# Patient Record
Sex: FEMALE | Race: WHITE | NOT HISPANIC OR LATINO | Employment: OTHER | URBAN - METROPOLITAN AREA
[De-identification: names, ages, dates, MRNs, and addresses within clinical notes are randomized per-mention and may not be internally consistent; named-entity substitution may affect disease eponyms.]

---

## 2017-02-09 ENCOUNTER — OFFICE VISIT (OUTPATIENT)
Dept: PODIATRY | Facility: CLINIC | Age: 82
End: 2017-02-09
Payer: MEDICARE

## 2017-02-09 VITALS
WEIGHT: 153.25 LBS | DIASTOLIC BLOOD PRESSURE: 76 MMHG | RESPIRATION RATE: 18 BRPM | SYSTOLIC BLOOD PRESSURE: 138 MMHG | HEIGHT: 63 IN | HEART RATE: 61 BPM | BODY MASS INDEX: 27.15 KG/M2

## 2017-02-09 DIAGNOSIS — E11.49 TYPE II DIABETES MELLITUS WITH NEUROLOGICAL MANIFESTATIONS: Primary | ICD-10-CM

## 2017-02-09 DIAGNOSIS — L84 CORN OR CALLUS: ICD-10-CM

## 2017-02-09 DIAGNOSIS — B35.1 DERMATOPHYTOSIS OF NAIL: ICD-10-CM

## 2017-02-09 PROCEDURE — 11721 DEBRIDE NAIL 6 OR MORE: CPT | Mod: 59,Q9,S$PBB, | Performed by: PODIATRIST

## 2017-02-09 PROCEDURE — 99213 OFFICE O/P EST LOW 20 MIN: CPT | Mod: PBBFAC,25 | Performed by: PODIATRIST

## 2017-02-09 PROCEDURE — 11056 PARNG/CUTG B9 HYPRKR LES 2-4: CPT | Mod: Q9,S$PBB,, | Performed by: PODIATRIST

## 2017-02-09 PROCEDURE — 11721 DEBRIDE NAIL 6 OR MORE: CPT | Mod: Q9,PBBFAC | Performed by: PODIATRIST

## 2017-02-09 PROCEDURE — 99213 OFFICE O/P EST LOW 20 MIN: CPT | Mod: 25,S$PBB,, | Performed by: PODIATRIST

## 2017-02-09 PROCEDURE — 11056 PARNG/CUTG B9 HYPRKR LES 2-4: CPT | Mod: Q9,PBBFAC | Performed by: PODIATRIST

## 2017-02-09 PROCEDURE — 99999 PR PBB SHADOW E&M-EST. PATIENT-LVL III: CPT | Mod: PBBFAC,,, | Performed by: PODIATRIST

## 2017-02-09 RX ORDER — DICLOFENAC SODIUM 50 MG/1
50 TABLET, DELAYED RELEASE ORAL 2 TIMES DAILY
COMMUNITY
End: 2022-07-19

## 2017-02-09 NOTE — PROGRESS NOTES
Subjective:      Patient ID: Rosy Balderrama is a 85 y.o. female.    Chief Complaint: Follow-up and Nail Care    Rosy is a 85 y.o. female who presents to the clinic for evaluation and treatment of high risk feet. Rosy has a past medical history of *Atrial fibrillation; Allergy; Blood transfusion; Cataract; Diabetes mellitus type II; Hyperlipidemia; and Osteoporosis. The patient's chief complaint is long, thick toenails and calluses. This patient has documented high risk feet requiring routine maintenance secondary to diabetes mellitis and those secondary complications of diabetes, as mentioned..  She relates that she has been experiencing 6 out of 10 tingling pins and needles pain running down her lower extremities.  She states she has been seen a back specialist and did receive the injection recently but did not experience much improvement.  She is scheduled to follow back up.    PCP: Jeffry Barrios MD    Date Last Seen by PCP: December 2016    Current shoe gear:  Affected Foot: Extra depth shoes with custome accommodative insoles     Unaffected Foot: Extra depth shoes with custome accommodative insoles    No results found for: HGBA1C    Review of Systems   Constitution: Negative for chills and fever.   Cardiovascular: Negative for chest pain.   Respiratory: Negative for shortness of breath.    Gastrointestinal: Negative for nausea and vomiting.           Objective:      Physical Exam   Constitutional: She is oriented to person, place, and time. She appears well-developed and well-nourished. No distress.   Cardiovascular:   Pulses:       Dorsalis pedis pulses are 2+ on the right side, and 2+ on the left side.        Posterior tibial pulses are 0 on the right side, and 0 on the left side.   Capillary fill time 3-5 seconds to digits bilateral feet.   Musculoskeletal:   Lower extremities:    Deformities: Hypertrophic hipj condyles deformities with normal to high arch height and rectus feet bilaterally.     5/5  muscle strength and tone in all four quadrants bilaterally.     Pain-free range of motion in all four quadrants with stiffness and limitation bilaterally.     Pain on palpation: Mild bilateral plantarmedial hallux and 1st mpj worse on the right.   Neurological: She is alert and oriented to person, place, and time. She displays no Babinski's sign on the right side. She displays no Babinski's sign on the left side.   Plantar protective sensation by touch via 5.07 [place] Liliana monofilament present bilaterally.   Skin:   Lower extremities:    Normal turgor, texture, temperature bilaterally. Digital hair present bilaterally. Warm equally bilaterally.    Bilateral ankle edema.  No varicosities, pigmentary changes.    No wounds, drainage, malodor, erythema, interdigital maceration were noted.     Hyperkeratotic buildup plantarmedial right hipj and 1st mpj bilateral feet.    Nails are thickened and incurvated bilaterally.    Psychiatric: She has a normal mood and affect.   Nursing note and vitals reviewed.            Assessment:       Encounter Diagnoses   Name Primary?    Type II diabetes mellitus with neurological manifestations Yes    Dermatophytosis of nail     Corn or callus          Plan:       Rosy was seen today for follow-up and nail care.    Diagnoses and all orders for this visit:    Type II diabetes mellitus with neurological manifestations    Dermatophytosis of nail    Corn or callus      I counseled the patient on her conditions, their implications and medical management.  We discussed source of her radiating pain as referred pain from her back first his possible component of peripheral neuropathy.  We discussed trying oral L methyl folate supplements for her symptoms versus anodyne therapy.  She wishes to hold off and follow-up with her pain management doctor first and we may consider other alternatives later.    After cleansing with alcohol prep pad, the above mentioned hyperkeratotic lesions were  sharply excisionally debrided x3 utilizing #15 blade to a smooth base without incident. Patient tolerated procedure well and reported comfort to the debridement sites. Off-loading cushioned pads were applied and dispensed to the patient with information on how to order additional pads for maintenance care of keratotic build up. Patient will continue to apply prescription urea lotion or over the counter alternatives to areas of recurrent skin build up.    - Shoe inspection. Diabetic Foot Education. Patient reminded of the importance of good nutrition and blood sugar control to help prevent podiatric complications of diabetes. Patient instructed on proper foot hygeine. We discussed wearing proper shoe gear, daily foot inspections, never walking without protective shoe gear, never putting sharp instruments to feet, routine podiatric nail visits every 2-3 months.      - With patient's permission, nails were aggressively reduced and debrided x 10 to their soft tissue attachment mechanically and with electric , removing all offending nail and debris. Patient relates relief following the procedure. She will continue to monitor the areas daily, inspect her feet, wear protective shoe gear when ambulatory, moisturizer to maintain skin integrity and follow in this office in approximately 2-3 months, sooner p.r.n.

## 2017-02-09 NOTE — MR AVS SNAPSHOT
[facility]  [address] [facility] [address]  Phone: [phone]  Fax: [phone]                  Rosy Balderrama   2017 10:20 AM   Office Visit    Description:  Female : 1931   Provider:  Ingris Calle DPM   Department:  [facility]           Reason for Visit     Follow-up     Nail Care                To Do List           Future Appointments        Provider Department Dept Phone    2017 10:00 AM Ingris Calle DPM [facility] [phone]      Goals (5 Years of Data)     None      [facility]     [facility] Nurse [facility] Line -  Assistance  Registered nurses in the [facility] Center provide clinical advisement, health education, appointment booking, and other advisory services.  Call for this free service at [phone].             Medications           Message regarding Medications     Verify the changes and/or additions to your medication regime listed below are the same as discussed with your clinician today.  If any of these changes or additions are incorrect, please notify your healthcare provider.             Verify that the below list of medications is an accurate representation of the medications you are currently taking.  If none reported, the list may be blank. If incorrect, please contact your healthcare provider. Carry this list with you in case of emergency.           Current Medications     acetaminophen (TYLENOL) 500 MG tablet Take by mouth. 1 Tablet Oral    AMINO ACIDS/MINERALS (A/G PRO ORAL) Take by mouth.    ammonium lactate 12 % Crea Apply topically.    ANTIOX#10/OM3/DHA/EPA/LUT/ZEAX (I-CAPS ORAL) Take by mouth.    ASCORBATE CALCIUM (VITAMIN C ORAL) Take by mouth.    ascorbic acid (VITAMIN C) 500 MG tablet Take 500 mg by mouth once daily.    aspirin 81 MG Chew Take 81 mg by mouth once daily.    bumetanide (BUMEX) 1 MG tablet     CALCIUM CARBONATE/VITAMIN D3 (CALTRATE 600 + D ORAL) Take by mouth.     "carboxymethylcellulose (REFRESH PLUS) 0.5 % Dpet 1 drop 3 (three) times daily as needed.    diclofenac (VOLTAREN) 50 MG EC tablet Take 50 mg by mouth 2 (two) times daily.    fluocinolone (DERMA-SMOOTHE) 0.01 % external oil Apply topically 3 (three) times daily.    fluticasone (FLONASE) 50 mcg/actuation nasal spray     folic acid-vit B6-vit B12 2.5-25-2 mg (FOLBIC) 2.5-25-2 mg Tab Take 1 tablet by mouth once daily.    FREESTYLE LANCETS 28 gauge lancets     FREESTYLE LITE STRIPS Strp     METHYL SALICYLATE/MENTHOL (BANALG-HS LINIMENT TOP) Apply topically.    metoprolol succinate (TOPROL-XL) 25 MG 24 hr tablet Take 25 mg by mouth once daily.    metoprolol tartrate (LOPRESSOR) 25 MG tablet     MULTIVITAMIN ORAL Take by mouth. Vitamin I    mupirocin (BACTROBAN) 2 % ointment 2 (two) times daily. Apply to affected area    oxybutynin (DITROPAN) 5 MG Tab     peg 400-propylene glycol, PF, (SYSTANE, PF,) 0.4-0.3 % Dpet Apply to eye.    polyethylene glycol (GLYCOLAX) 17 gram PwPk Take by mouth.    pravastatin (PRAVACHOL) 40 MG tablet     PROPYLENE GLYCOL/ (SYSTANE OPHT) Apply to eye.    sitagliptan (JANUVIA) 100 MG Tab Take 100 mg by mouth once daily.    sodium chloride 0.9 % SprA by Nasal route.    tramadol (ULTRAM) 50 mg tablet     triamcinolone acetonide 0.1% (KENALOG) 0.1 % cream Apply topically 2 (two) times daily.    urea (X-VIATE) 40 % Crea Apply topically once daily. Apply topically to bottom of foot for calluses    nitrofurantoin, macrocrystal-monohydrate, (MACROBID) 100 MG capsule            Clinical Reference Information           Your Vitals Were     BP Pulse Resp Height Weight BMI    138/76 61 18 5' 3" (1.6 m) 69.5 kg (153 lb 3.5 oz) 27.14 kg/m2      Blood Pressure          Most Recent Value    BP  138/76      Allergies as of 2/9/2017     Alendronate    Alendronate Sodium    Atorvastatin    Ciprofloxacin    Diazepam    Ibuprofen    Oxycodone-acetaminophen    Rofecoxib    Simvastatin      Immunizations " Administered on Date of Encounter - 2/9/2017     None      [facility] TV[facility]ariel[facility] Sign-Up     Activating your [facility] account is as easy as 1-2-3!     1) Visit [email or web address], select Sign Up Now, enter this activation code and your date of birth, then select Next.  [email or web address]  Expires: 3/26/2017 11:23 AM      2) Create a username and password to use when you visit [facility] in the future and select a security question in case you lose your password and select Next.    3) Enter your e-mail address and click Sign Up!    Additional Information  If you have questions, please e-mail [email or web address] or call [phone] to talk to our [facility] staff. Remember, [facility] is NOT to be used for urgent needs. For medical emergencies, dial 911.         Language Assistance Services     ATTENTION: Language assistance services are available, free of charge. Please call [phone].      ATENCIÓN: Si habla español, tiene a chavarria disposición servicios gratuitos de asistencia lingüística. Llame al [phone].     CHÚ Ý: N?u b?n nói Ti?ng Vi?t, có các d?ch v? h? tr? ngôn ng? mi?n phí dành cho b?n. G?i s? [phone].         [facility] complies with applicable Federal civil rights laws and does not discriminate on the basis of race, color, national origin, age, disability, or sex.

## 2017-06-14 ENCOUNTER — OFFICE VISIT (OUTPATIENT)
Dept: PODIATRY | Facility: CLINIC | Age: 82
End: 2017-06-14
Payer: MEDICARE

## 2017-06-14 VITALS
BODY MASS INDEX: 26.11 KG/M2 | HEIGHT: 63 IN | HEART RATE: 55 BPM | WEIGHT: 147.38 LBS | SYSTOLIC BLOOD PRESSURE: 107 MMHG | DIASTOLIC BLOOD PRESSURE: 68 MMHG

## 2017-06-14 DIAGNOSIS — E11.49 TYPE II DIABETES MELLITUS WITH NEUROLOGICAL MANIFESTATIONS: Primary | ICD-10-CM

## 2017-06-14 DIAGNOSIS — B35.1 DERMATOPHYTOSIS OF NAIL: ICD-10-CM

## 2017-06-14 DIAGNOSIS — L84 CORN OR CALLUS: ICD-10-CM

## 2017-06-14 DIAGNOSIS — S91.301A WOUND, OPEN, FOOT, RIGHT, INITIAL ENCOUNTER: ICD-10-CM

## 2017-06-14 PROCEDURE — 11056 PARNG/CUTG B9 HYPRKR LES 2-4: CPT | Mod: Q8,PBBFAC | Performed by: PODIATRIST

## 2017-06-14 PROCEDURE — 97597 DBRDMT OPN WND 1ST 20 CM/<: CPT | Mod: 59,S$PBB,, | Performed by: PODIATRIST

## 2017-06-14 PROCEDURE — 97597 DBRDMT OPN WND 1ST 20 CM/<: CPT | Mod: Q8,PBBFAC | Performed by: PODIATRIST

## 2017-06-14 PROCEDURE — 1126F AMNT PAIN NOTED NONE PRSNT: CPT | Mod: S$PBB,,, | Performed by: PODIATRIST

## 2017-06-14 PROCEDURE — 11721 DEBRIDE NAIL 6 OR MORE: CPT | Mod: Q8,PBBFAC | Performed by: PODIATRIST

## 2017-06-14 PROCEDURE — 11056 PARNG/CUTG B9 HYPRKR LES 2-4: CPT | Mod: Q8,S$PBB,, | Performed by: PODIATRIST

## 2017-06-14 PROCEDURE — 99213 OFFICE O/P EST LOW 20 MIN: CPT | Mod: PBBFAC | Performed by: PODIATRIST

## 2017-06-14 PROCEDURE — 1159F MED LIST DOCD IN RCRD: CPT | Mod: S$PBB,,, | Performed by: PODIATRIST

## 2017-06-14 PROCEDURE — 99999 PR PBB SHADOW E&M-EST. PATIENT-LVL III: CPT | Mod: PBBFAC,,, | Performed by: PODIATRIST

## 2017-06-14 PROCEDURE — 99213 OFFICE O/P EST LOW 20 MIN: CPT | Mod: 25,S$PBB,, | Performed by: PODIATRIST

## 2017-06-14 PROCEDURE — 11721 DEBRIDE NAIL 6 OR MORE: CPT | Mod: 59,Q8,S$PBB, | Performed by: PODIATRIST

## 2017-06-14 RX ORDER — SILVER SULFADIAZINE 10 G/1000G
CREAM TOPICAL 2 TIMES DAILY
Qty: 50 G | Refills: 1 | Status: SHIPPED | OUTPATIENT
Start: 2017-06-14 | End: 2017-07-14

## 2017-06-14 RX ORDER — SILVER SULFADIAZINE 10 G/1000G
CREAM TOPICAL 2 TIMES DAILY
Qty: 50 G | Refills: 1 | Status: SHIPPED | OUTPATIENT
Start: 2017-06-14 | End: 2017-06-14 | Stop reason: CLARIF

## 2017-06-14 NOTE — PROGRESS NOTES
Subjective:      Patient ID: Rosy Balderrama is a 85 y.o. female.    Chief Complaint: Nail Care and Diabetes Mellitus    Rosy is a 85 y.o. female who presents to the clinic for evaluation and treatment of high risk feet. Rosy has a past medical history of *Atrial fibrillation; Allergy; Blood transfusion; Cataract; Diabetes mellitus type II; Hyperlipidemia; and Osteoporosis. The patient's chief complaint is long, thick toenails. This patient has documented high risk feet requiring routine maintenance secondary to diabetes mellitis and those secondary complications of diabetes, as mentioned..  She is here with her daughter who also expresses some concern about a wound under her right fourth toe which she noticed about 2 weeks ago.  She states that initially the wound started off as a small fissure possibly from a blister and started to heal up but last week appeared to break back oh then in a slightly lateral location from where it originally started.  Her daughter suspects that her mother over did some activities in a certain type of slipper which she has now discarded.    PCP: Jeffry Barrios MD    Date Last Seen by PCP: Jan 2017    Current shoe gear:  Affected Foot: Extra depth shoes with custome accommodative insoles     Unaffected Foot: Extra depth shoes with custome accommodative insoles    No results found for: HGBA1C    Review of Systems   Constitution: Negative for chills and fever.   Cardiovascular: Negative for chest pain.   Respiratory: Negative for shortness of breath.    Gastrointestinal: Negative for nausea and vomiting.           Objective:      Physical Exam   Constitutional: She is oriented to person, place, and time. She appears well-developed and well-nourished. No distress.   Cardiovascular:   Pulses:       Dorsalis pedis pulses are 2+ on the right side, and 2+ on the left side.        Posterior tibial pulses are 0 on the right side, and 0 on the left side.   Capillary fill time 3-5 seconds to  digits bilateral feet.   Musculoskeletal:   Lower extremities:    Deformities: Hypertrophic hipj condyles deformities with normal to high arch height and rectus feet bilaterally.     5/5 muscle strength and tone in all four quadrants bilaterally.     Pain-free range of motion in all four quadrants with stiffness and limitation bilaterally.     Pain on palpation: Mild bilateral plantarmedial hallux and 1st mpj worse on the right.   Neurological: She is alert and oriented to person, place, and time. She displays no Babinski's sign on the right side. She displays no Babinski's sign on the left side.   Plantar protective sensation by touch via 5.07 [place] Liliana monofilament present bilaterally.   Skin:   Lower extremities:    Normal turgor, texture, temperature bilaterally. Digital hair present bilaterally. Warm equally bilaterally.    No varicosities, pigmentary changes.    Wound: Right sub-fourth met head 5 x 2 x 2 mm dermal partial thickness ulcer with hyperkeratotic rim.  Drainage: serosanguinous spotting  Malodor: None  Erythema: Mild local periwound  Edema: Mild local periwound. Bilateral ankle edema.  Interdigital maceration: None  Skin lesions: None    No wounds, drainage, malodor, erythema, interdigital maceration were noted.     Hyperkeratotic buildup plantarmedial right hipj and 1st mpj bilateral feet.    Nails are thickened and incurvated bilaterally.    Psychiatric: She has a normal mood and affect.   Nursing note and vitals reviewed.            Assessment:       Encounter Diagnoses   Name Primary?    Type II diabetes mellitus with neurological manifestations Yes    Wound, open, foot, right, initial encounter     Dermatophytosis of nail     Corn or callus          Plan:       Rosy was seen today for nail care and diabetes mellitus.    Diagnoses and all orders for this visit:    Type II diabetes mellitus with neurological manifestations    Wound, open, foot, right, initial  encounter    Dermatophytosis of nail    Corn or callus    Other orders  -     silver sulfADIAZINE 1% (SILVADENE) 1 % cream; Apply topically 2 (two) times daily.      I counseled the patient on her conditions, their implications and medical management.    Dermal fissure wound with keratotic rim was excisionally debrided of viable and nonviable tissue with #15 blade down to partial thickness dermal tissue base with serosanguineous spotting.  Wound was dressed with Silvadene and Band-Aid. Patient was instructed on continuing application of antibiotic ointment with a light dressing for additional week.  Patient was guided on appropriate offloading with padding and extra depth wider shoes to prevent recurrence.  Prescription was also written for Silvadene.  If she notices worsening of the fissure was she will schedule to return to clinic immediately.  Patient will call if any problems arise.    After cleansing with alcohol prep pad, the above mentioned hyperkeratotic lesions were sharply trimmed x3 utilizing #15 blade to a smooth base without incident. Patient tolerated procedure well and reported comfort to the debridement sites. Patient will continue to use padding and apply prescription urea lotion or over the counter alternatives to areas of recurrent skin build up.    - Shoe inspection. Diabetic Foot Education. Patient reminded of the importance of good nutrition and blood sugar control to help prevent podiatric complications of diabetes. Patient instructed on proper foot hygeine. We discussed wearing proper shoe gear, daily foot inspections, never walking without protective shoe gear, never putting sharp instruments to feet, routine podiatric nail visits every 2-3 months.      - With patient's permission, nails were aggressively reduced and debrided x 10 to their soft tissue attachment mechanically and with electric , removing all offending nail and debris. Patient relates relief following the procedure. She  will continue to monitor the areas daily, inspect her feet, wear protective shoe gear when ambulatory, moisturizer to maintain skin integrity and follow in this office in approximately 2-3 months, sooner p.r.n.

## 2017-06-14 NOTE — PATIENT INSTRUCTIONS
Keep wound and dressings clean and dry. Dressing changes as directed. Avoid pressure to the wound area and unnecessary ambulation until complete healing. The patient was instructed to call for any signs of worsening wound or infection.    Remember the importance of good nutrition and blood sugar control to help prevent foot complications of diabetes and see your internist at least ever six months. Always wear proper shoe gear. Inspect your feet daily. Always call the clinic immediately if you notice something on your feet that is not normal such as a blister, wound, or foreign object stuck in your foot.

## 2017-08-16 ENCOUNTER — OFFICE VISIT (OUTPATIENT)
Dept: PODIATRY | Facility: CLINIC | Age: 82
End: 2017-08-16
Payer: MEDICARE

## 2017-08-16 VITALS
DIASTOLIC BLOOD PRESSURE: 72 MMHG | BODY MASS INDEX: 26.05 KG/M2 | HEART RATE: 56 BPM | SYSTOLIC BLOOD PRESSURE: 145 MMHG | RESPIRATION RATE: 20 BRPM | HEIGHT: 63 IN | WEIGHT: 147 LBS

## 2017-08-16 DIAGNOSIS — B35.1 DERMATOPHYTOSIS OF NAIL: ICD-10-CM

## 2017-08-16 DIAGNOSIS — E11.49 TYPE II DIABETES MELLITUS WITH NEUROLOGICAL MANIFESTATIONS: Primary | ICD-10-CM

## 2017-08-16 PROCEDURE — 11721 DEBRIDE NAIL 6 OR MORE: CPT | Mod: Q8,PBBFAC | Performed by: PODIATRIST

## 2017-08-16 PROCEDURE — 99999 PR PBB SHADOW E&M-EST. PATIENT-LVL III: CPT | Mod: PBBFAC,,, | Performed by: PODIATRIST

## 2017-08-16 PROCEDURE — 99499 UNLISTED E&M SERVICE: CPT | Mod: S$PBB,,, | Performed by: PODIATRIST

## 2017-08-16 PROCEDURE — 99213 OFFICE O/P EST LOW 20 MIN: CPT | Mod: PBBFAC | Performed by: PODIATRIST

## 2017-08-16 PROCEDURE — 11721 DEBRIDE NAIL 6 OR MORE: CPT | Mod: Q8,S$PBB,, | Performed by: PODIATRIST

## 2017-08-16 NOTE — PATIENT INSTRUCTIONS
Remember the importance of good nutrition and blood sugar control to help prevent foot complications of diabetes and see your internist at least ever six months. Always wear proper shoe gear. Inspect your feet daily. Always call the clinic immediately if you notice something on your feet that is not normal such as a blister, wound, or foreign object stuck in your foot.     [facility]  [address] [facility] [place] [address]   Phone: [phone]  Fax: [phone] [address] [phone]

## 2017-08-16 NOTE — PROGRESS NOTES
Subjective:      Patient ID: Rosy Balderrama is a 85 y.o. female.    Chief Complaint: Nail Care and Diabetes Mellitus (PCP Mamta )    Rosy is a 85 y.o. female who presents to the clinic for evaluation and treatment of high risk feet. Rosy has a past medical history of *Atrial fibrillation; Allergy; Blood transfusion; Cataract; Diabetes mellitus type II; Hyperlipidemia; and Osteoporosis. The patient's chief complaint is long, thick toenails. This patient has documented high risk feet requiring routine maintenance secondary to diabetes mellitis and those secondary complications of diabetes, as mentioned.. Wound noted at prior visit now healed. Patient requests new prescription for diabetic shoes.    PCP: Jeffry Barrios MD    Date Last Seen by PCP: August 2017    Current shoe gear:  Affected Foot: Rx diabetic extra depth shoes and custom accommodative insoles     Unaffected Foot: Rx diabetic extra depth shoes and custom accommodative insoles    No results found for: HGBA1C    Review of Systems   Constitution: Negative for chills and fever.   Cardiovascular: Negative for chest pain.   Respiratory: Negative for shortness of breath.    Gastrointestinal: Negative for nausea and vomiting.           Objective:      Physical Exam   Constitutional: She is oriented to person, place, and time. She appears well-developed and well-nourished. No distress.   Cardiovascular:   Pulses:       Dorsalis pedis pulses are 2+ on the right side, and 2+ on the left side.        Posterior tibial pulses are 0 on the right side, and 0 on the left side.   Capillary fill time 3-5 seconds to digits bilateral feet.   Musculoskeletal:   Lower extremities:    Deformities: Hypertrophic hipj condyles deformities with normal to high arch height and rectus feet bilaterally.     5/5 muscle strength and tone in all four quadrants bilaterally.     Pain-free range of motion in all four quadrants with stiffness and limitation bilaterally.     Pain on  palpation: Mild bilateral plantarmedial hallux and 1st mpj worse on the right.   Neurological: She is alert and oriented to person, place, and time. She displays no Babinski's sign on the right side. She displays no Babinski's sign on the left side.   Plantar protective sensation by touch via 5.07 [place] Liliana monofilament present bilaterally.   Skin:   Lower extremities:    Normal turgor, texture, temperature bilaterally. Digital hair present bilaterally. Warm equally bilaterally.    No varicosities, pigmentary changes.    Wound: Right sub-fourth met head 5 x 2 x 2 mm dermal partial thickness ulcer with hyperkeratotic rim.  Drainage: serosanguinous spotting  Malodor: None  Erythema: Mild local periwound  Edema: Mild local periwound. Bilateral ankle edema.  Interdigital maceration: None  Skin lesions: None    No wounds, drainage, malodor, erythema, interdigital maceration were noted.     Hyperkeratotic buildup plantarmedial right hipj and 1st mpj bilateral feet.    Nails are thickened and incurvated bilaterally.    Psychiatric: She has a normal mood and affect.   Nursing note and vitals reviewed.            Assessment:       Encounter Diagnoses   Name Primary?    Type II diabetes mellitus with neurological manifestations Yes    Dermatophytosis of nail          Plan:       Rosy was seen today for nail care and diabetes mellitus.    Diagnoses and all orders for this visit:    Type II diabetes mellitus with neurological manifestations  -     DIABETIC SHOES FOR HOME USE    Dermatophytosis of nail  -     DIABETIC SHOES FOR HOME USE      I counseled the patient on her conditions, their implications and medical management.    Patient was given a prescription for diabetic extra depth shoes with custom inserts to be casted and fitted for inserts and shoes. Patient will followup after shoe and insert dispensal to check for any discomfort or irritations.  Explained to the patient that symptoms are most consistent with  diabetic peripheral neuropathy and that blood sugar control is crucial to control any progression or worsening of symptoms.     - Shoe inspection. Diabetic Foot Education. Patient reminded of the importance of good nutrition and blood sugar control to help prevent podiatric complications of diabetes. Patient instructed on proper foot hygeine. We discussed wearing proper shoe gear, daily foot inspections, never walking without protective shoe gear, never putting sharp instruments to feet, routine podiatric nail visits every 2-3 months.      - With patient's permission, nails were aggressively reduced and debrided x 10 to their soft tissue attachment mechanically and with electric , removing all offending nail and debris. Patient relates relief following the procedure. She will continue to monitor the areas daily, inspect her feet, wear protective shoe gear when ambulatory, moisturizer to maintain skin integrity and follow in this office in approximately 2-3 months, sooner p.r.n.

## 2017-08-21 ENCOUNTER — TELEPHONE (OUTPATIENT)
Dept: PODIATRY | Facility: CLINIC | Age: 82
End: 2017-08-21

## 2017-08-21 NOTE — TELEPHONE ENCOUNTER
----- Message from Harjinder Cm sent at 8/21/2017  1:56 PM CDT -----  Pt is requesting a call from nurse to f/u on her diabetic shoes.        Please call pt back at [phone] please leave a detail message

## 2017-08-21 NOTE — TELEPHONE ENCOUNTER
Ms. Rosy Balderrama was given a return call, but there was no answer or an option to leave a message.

## 2017-08-21 NOTE — TELEPHONE ENCOUNTER
----- Message from Harjinder Cm sent at 8/21/2017  1:56 PM CDT -----  Pt is requesting a call from nurse to f/u on her diabetic shoes.        Please call pt back at [phone] please leave a detail message

## 2017-08-24 ENCOUNTER — TELEPHONE (OUTPATIENT)
Dept: PODIATRY | Facility: CLINIC | Age: 82
End: 2017-08-24

## 2017-08-24 NOTE — TELEPHONE ENCOUNTER
----- Message from Halina Steen sent at 8/24/2017  9:00 AM CDT -----  Contact: pt  Please call pt @ [phone] or leave message, regarding diabetic shoes.

## 2017-08-25 ENCOUNTER — TELEPHONE (OUTPATIENT)
Dept: PODIATRY | Facility: CLINIC | Age: 82
End: 2017-08-25

## 2017-08-25 NOTE — TELEPHONE ENCOUNTER
Spoke with patient. States she misplaced her Rx for Diabetic Shoes and would like new one faxed to [facility]. Advised pt a new one will be faxed over today. Verbalized understanding. Call ended well.

## 2017-08-25 NOTE — TELEPHONE ENCOUNTER
----- Message from Tayler Jacob sent at 8/25/2017  2:31 PM CDT -----  Contact: Pt  Pt called to speak to the nurse to request a new set of instructions as to where she needs to go to be fitted for her diabetic shoes and would like a call back today asap. Pt would like the information sent to [employer] [phone] phone located at [address], [place].     Pt can be reached at [phone].    Thanks

## 2017-11-22 ENCOUNTER — OFFICE VISIT (OUTPATIENT)
Dept: PODIATRY | Facility: CLINIC | Age: 82
End: 2017-11-22
Payer: MEDICARE

## 2017-11-22 VITALS
HEIGHT: 63 IN | WEIGHT: 150.94 LBS | HEART RATE: 60 BPM | SYSTOLIC BLOOD PRESSURE: 130 MMHG | DIASTOLIC BLOOD PRESSURE: 70 MMHG | BODY MASS INDEX: 26.75 KG/M2

## 2017-11-22 DIAGNOSIS — E11.49 TYPE II DIABETES MELLITUS WITH NEUROLOGICAL MANIFESTATIONS: Primary | ICD-10-CM

## 2017-11-22 DIAGNOSIS — B35.1 DERMATOPHYTOSIS OF NAIL: ICD-10-CM

## 2017-11-22 DIAGNOSIS — L84 CORN OR CALLUS: ICD-10-CM

## 2017-11-22 PROCEDURE — 99213 OFFICE O/P EST LOW 20 MIN: CPT | Mod: PBBFAC | Performed by: PODIATRIST

## 2017-11-22 PROCEDURE — 11721 DEBRIDE NAIL 6 OR MORE: CPT | Mod: 59,Q8,S$PBB, | Performed by: PODIATRIST

## 2017-11-22 PROCEDURE — 99499 UNLISTED E&M SERVICE: CPT | Mod: S$PBB,,, | Performed by: PODIATRIST

## 2017-11-22 PROCEDURE — 99999 PR PBB SHADOW E&M-EST. PATIENT-LVL III: CPT | Mod: PBBFAC,,, | Performed by: PODIATRIST

## 2017-11-22 PROCEDURE — 11721 DEBRIDE NAIL 6 OR MORE: CPT | Mod: Q8,PBBFAC | Performed by: PODIATRIST

## 2017-11-22 PROCEDURE — 11056 PARNG/CUTG B9 HYPRKR LES 2-4: CPT | Mod: Q8,PBBFAC | Performed by: PODIATRIST

## 2017-11-22 PROCEDURE — 11056 PARNG/CUTG B9 HYPRKR LES 2-4: CPT | Mod: Q8,S$PBB,, | Performed by: PODIATRIST

## 2017-11-22 NOTE — PROGRESS NOTES
Office Visit 11/22/2017  Last encounter in this department: 8/16/2017    Subjective:      Patient ID: Rosy Balderrama is a 86 y.o. female.    Chief Complaint: Diabetes Mellitus (Denis 7/2017) and Routine Foot Care ([facility])    Rosy is a 86 y.o. female who presents to the clinic for evaluation and treatment of high risk feet. Rosy has a past medical history of *Atrial fibrillation; Allergy; Blood transfusion; Cataract; Diabetes mellitus type II; Hyperlipidemia; and Osteoporosis. The patient's chief complaint is long, thick toenails and calluses. This patient has documented high risk feet requiring routine maintenance secondary to diabetes mellitis and those secondary complications of diabetes, as mentioned..  She also complains of recent irritation under the ball of her right fifth toe which she would like inspected.  She states it feels like an irritation but when she checks her socks and shoes is unable to find anything.  She continues to apply urea lotion for rough spots along the feet which she finds to be helpful.    PCP: Jeffry Barrios MD    Date Last Seen by PCP: 7/2017    Current shoe gear:  Affected Foot: Rx diabetic extra depth shoes and custom accommodative insoles     Unaffected Foot: Rx diabetic extra depth shoes and custom accommodative insoles    No results found for: HGBA1C    Review of Systems   Constitution: Negative for chills and fever.   Cardiovascular: Negative for chest pain.   Respiratory: Negative for shortness of breath.    Gastrointestinal: Negative for nausea and vomiting.           Objective:      Physical Exam   Constitutional: She is oriented to person, place, and time. She appears well-developed and well-nourished. No distress.   Cardiovascular:   Pulses:       Dorsalis pedis pulses are 2+ on the right side, and 2+ on the left side.        Posterior tibial pulses are 0 on the right side, and 0 on the left side.   Capillary fill time 3-5 seconds to digits bilateral feet.    Musculoskeletal:   Lower extremities:    Deformities: Hypertrophic hipj condyles deformities with normal to high arch height and rectus feet bilaterally.     5/5 muscle strength and tone in all four quadrants bilaterally.     Pain-free range of motion in all four quadrants with stiffness and limitation bilaterally.     Pain on palpation: Mild bilateral plantarmedial hallux and 1st mpj worse on the right.   Neurological: She is alert and oriented to person, place, and time. She displays no Babinski's sign on the right side. She displays no Babinski's sign on the left side.   Plantar protective sensation by touch via 5.07 [place] Liliana monofilament present bilaterally.   Skin:   Lower extremities:    Normal turgor, texture, temperature bilaterally. Digital hair present bilaterally. Warm equally bilaterally.    No varicosities, pigmentary changes.    Wound: none  Drainage: none  Malodor: None  Erythema: none  Edema: Bilateral ankle edema.  Interdigital maceration: None    Hyperkeratotic buildup plantarmedial right hipj and 1st mpj bilateral feet.    Nails are thickened and incurvated bilaterally.    Psychiatric: She has a normal mood and affect.   Nursing note and vitals reviewed.            X-rays: not indicated    Assessment:       Encounter Diagnoses   Name Primary?    Type II diabetes mellitus with neurological manifestations Yes    Dermatophytosis of nail     Corn or callus          Plan:       Rosy was seen today for diabetes mellitus and routine foot care.    Diagnoses and all orders for this visit:    Type II diabetes mellitus with neurological manifestations    Dermatophytosis of nail    Corn or callus      I counseled the patient on her conditions, their implications and medical management.    - Shoe inspection.  No points of irregularity along the insole that could be causing irritation to the right foot.  Diabetic Foot Education. Patient reminded of the importance of good nutrition and blood sugar  control to help prevent podiatric complications of diabetes. Patient instructed on proper foot hygeine. We discussed wearing proper shoe gear, daily foot inspections, never walking without protective shoe gear, never putting sharp instruments to feet, routine podiatric nail visits every 2-3 months.      - With patient's permission, nails were aggressively reduced and debrided x 10 to their soft tissue attachment mechanically and with electric , removing all offending nail and debris. Patient relates relief following the procedure. She will continue to monitor the areas daily, inspect her feet, wear protective shoe gear when ambulatory, moisturizer to maintain skin integrity and follow in this office in approximately 2-3 months, sooner p.r.n.    After cleansing with alcohol prep pad, the above mentioned hyperkeratotic lesions were sharply trimmed x3 utilizing #15 blade to a smooth base without incident. Patient tolerated procedure well and reported comfort to the debridement sites. Patient will continue to use padding and apply prescription urea lotion or over the counter alternatives to areas of recurrent skin build up.

## 2017-12-04 ENCOUNTER — TELEPHONE (OUTPATIENT)
Dept: PODIATRY | Facility: CLINIC | Age: 82
End: 2017-12-04

## 2017-12-04 NOTE — TELEPHONE ENCOUNTER
----- Message from Henrik Bridges sent at 12/4/2017  4:04 PM CST -----  Contact: pt  Pt is calling nurse staff regarding a new RX suldiazine 1% CREAM easy open. Pt call back  [phone] thanks

## 2017-12-04 NOTE — TELEPHONE ENCOUNTER
Ms.. Rosy Balderrama was given an return call, and she was informed to contact Dr. Calle and her staff on Wednesday when they return to the clinic. Ms. Balderrama verbalized understanding, and the call ended well.

## 2017-12-06 ENCOUNTER — TELEPHONE (OUTPATIENT)
Dept: PODIATRY | Facility: CLINIC | Age: 82
End: 2017-12-06

## 2017-12-07 ENCOUNTER — TELEPHONE (OUTPATIENT)
Dept: PODIATRY | Facility: CLINIC | Age: 82
End: 2017-12-07

## 2017-12-07 NOTE — TELEPHONE ENCOUNTER
Returned patients phone call. Pt is requesting a years worth prescription for silvadene cream. I explained to patient that I will send over this request to provider. Patient would like to use [facility].

## 2017-12-07 NOTE — TELEPHONE ENCOUNTER
----- Message from Rere Horan sent at 12/7/2017 12:33 PM CST -----  Contact: self   Patient would like to consult with nurse regarding status of medication. Please call back at [phone].       Thanks,  Rere Horan

## 2017-12-08 ENCOUNTER — TELEPHONE (OUTPATIENT)
Dept: PODIATRY | Facility: CLINIC | Age: 82
End: 2017-12-08

## 2017-12-08 RX ORDER — SILVER SULFADIAZINE 10 G/1000G
CREAM TOPICAL DAILY
Qty: 50 G | Refills: 3 | Status: SHIPPED | OUTPATIENT
Start: 2017-12-08 | End: 2018-01-07

## 2017-12-08 RX ORDER — SILVER SULFADIAZINE 10 G/1000G
CREAM TOPICAL DAILY
Qty: 50 G | Refills: 3 | Status: SHIPPED | OUTPATIENT
Start: 2017-12-08 | End: 2017-12-08 | Stop reason: SDUPTHER

## 2018-02-21 ENCOUNTER — OFFICE VISIT (OUTPATIENT)
Dept: PODIATRY | Facility: CLINIC | Age: 83
End: 2018-02-21
Payer: MEDICARE

## 2018-02-21 VITALS
WEIGHT: 148.81 LBS | BODY MASS INDEX: 26.37 KG/M2 | HEIGHT: 63 IN | HEART RATE: 55 BPM | DIASTOLIC BLOOD PRESSURE: 70 MMHG | SYSTOLIC BLOOD PRESSURE: 133 MMHG

## 2018-02-21 DIAGNOSIS — E11.49 TYPE II DIABETES MELLITUS WITH NEUROLOGICAL MANIFESTATIONS: Primary | ICD-10-CM

## 2018-02-21 DIAGNOSIS — B35.1 DERMATOPHYTOSIS OF NAIL: ICD-10-CM

## 2018-02-21 DIAGNOSIS — L84 CORN OR CALLUS: ICD-10-CM

## 2018-02-21 PROCEDURE — 99213 OFFICE O/P EST LOW 20 MIN: CPT | Mod: PBBFAC,25 | Performed by: PODIATRIST

## 2018-02-21 PROCEDURE — 99499 UNLISTED E&M SERVICE: CPT | Mod: S$PBB,,, | Performed by: PODIATRIST

## 2018-02-21 PROCEDURE — 99999 PR PBB SHADOW E&M-EST. PATIENT-LVL III: CPT | Mod: PBBFAC,,, | Performed by: PODIATRIST

## 2018-02-21 PROCEDURE — 11721 DEBRIDE NAIL 6 OR MORE: CPT | Mod: 59,Q8,S$PBB, | Performed by: PODIATRIST

## 2018-02-21 PROCEDURE — 11056 PARNG/CUTG B9 HYPRKR LES 2-4: CPT | Mod: Q8,S$PBB,, | Performed by: PODIATRIST

## 2018-02-21 PROCEDURE — 11056 PARNG/CUTG B9 HYPRKR LES 2-4: CPT | Mod: Q8,PBBFAC | Performed by: PODIATRIST

## 2018-02-21 PROCEDURE — 11721 DEBRIDE NAIL 6 OR MORE: CPT | Mod: Q8,PBBFAC | Performed by: PODIATRIST

## 2018-02-21 RX ORDER — SILVER SULFADIAZINE 10 G/1000G
CREAM TOPICAL
COMMUNITY
Start: 2018-02-19 | End: 2022-07-19

## 2018-02-21 NOTE — PROGRESS NOTES
Office Visit 2/21/2018  Last encounter in this department: 11/22/2017    Subjective:      Patient ID: Rosy Balderrama is a 86 y.o. female.    Chief Complaint: Follow-up (PCP Dr. Faulkner [facility])    Rosy is a 86 y.o. female who presents to the clinic for evaluation and treatment of high risk feet. Rosy has a past medical history of *Atrial fibrillation; Allergy; Blood transfusion; Cataract; Diabetes mellitus type II; Hyperlipidemia; and Osteoporosis. The patient's chief complaint is long, thick toenails and calluses. This patient has documented high risk feet requiring routine maintenance secondary to diabetes mellitis and those secondary complications of diabetes, as mentioned..    PCP: Jeffry Barrios MD    Date Last Seen by PCP: Jan 29, 2018    Current shoe gear:  Affected Foot: Rx diabetic extra depth shoes and custom accommodative insoles     Unaffected Foot: Rx diabetic extra depth shoes and custom accommodative insoles    No results found for: HGBA1C    Review of Systems   Constitution: Negative for chills and fever.   Cardiovascular: Negative for chest pain.   Respiratory: Negative for shortness of breath.    Gastrointestinal: Negative for nausea and vomiting.           Objective:      Physical Exam   Constitutional: She is oriented to person, place, and time. She appears well-developed and well-nourished. No distress.   Cardiovascular:   Pulses:       Dorsalis pedis pulses are 2+ on the right side, and 2+ on the left side.        Posterior tibial pulses are 0 on the right side, and 0 on the left side.   Capillary fill time 3-5 seconds to digits bilateral feet.   Musculoskeletal:   Lower extremities:    Deformities: Hypertrophic hipj condyles deformities with normal to high arch height and rectus feet bilaterally.     5/5 muscle strength and tone in all four quadrants bilaterally.     Pain-free range of motion in all four quadrants with stiffness and limitation bilaterally.     Pain on palpation: Mild  bilateral plantarmedial hallux and 1st mpj worse on the right.   Neurological: She is alert and oriented to person, place, and time. She displays no Babinski's sign on the right side. She displays no Babinski's sign on the left side.   Plantar protective sensation by touch via 5.07 [place] Liliana monofilament present bilaterally.   Skin:   Lower extremities:    Normal turgor, texture, temperature bilaterally. Digital hair present bilaterally. Warm equally bilaterally.    No varicosities, pigmentary changes.    Wound: none  Drainage: none  Malodor: None  Erythema: none  Edema: Bilateral ankle edema.  Interdigital maceration: None    Hyperkeratotic buildup plantarmedial right hipj and 1st mpj bilateral feet.    Nails are thickened and incurvated bilaterally.    Psychiatric: She has a normal mood and affect.   Nursing note and vitals reviewed.            X-rays: not indicated    Assessment:       Encounter Diagnoses   Name Primary?    Type II diabetes mellitus with neurological manifestations Yes    Dermatophytosis of nail     Corn or callus          Plan:       Rosy was seen today for follow-up.    Diagnoses and all orders for this visit:    Type II diabetes mellitus with neurological manifestations    Dermatophytosis of nail    Corn or callus      I counseled the patient on her conditions, their implications and medical management.    - Shoe inspection. Diabetic Foot Education. Patient reminded of the importance of good nutrition and blood sugar control to help prevent podiatric complications of diabetes. Patient instructed on proper foot hygeine. We discussed wearing proper shoe gear, daily foot inspections, never walking without protective shoe gear, never putting sharp instruments to feet, routine podiatric nail visits every 2-3 months.      - With patient's permission, nails were aggressively reduced and debrided x 10 to their soft tissue attachment mechanically and with electric , removing all  offending nail and debris. Patient relates relief following the procedure. She will continue to monitor the areas daily, inspect her feet, wear protective shoe gear when ambulatory, moisturizer to maintain skin integrity and follow in this office in approximately 2-3 months, sooner p.r.n.    After cleansing with alcohol prep pad, the above mentioned hyperkeratotic lesions were sharply trimmed x3 utilizing #15 blade to a smooth base without incident. Patient tolerated procedure well and reported comfort to the debridement sites. Patient will continue to use padding and apply prescription urea lotion or over the counter alternatives to areas of recurrent skin build up.

## 2018-02-21 NOTE — Clinical Note
Need approximate visit date with primary care. If past 6 months, need to schedule and get date of upcoming appt.

## 2018-06-01 ENCOUNTER — TELEPHONE (OUTPATIENT)
Dept: PODIATRY | Facility: CLINIC | Age: 83
End: 2018-06-01

## 2018-06-01 NOTE — TELEPHONE ENCOUNTER
----- Message from Clint Mcdaniels sent at 5/30/2018  4:46 PM CDT -----  Contact: Pt.   Pt is calling regarding  getting fitted into schedule. Pt needs to See provider to get Form to receive Shoe. Pt was unable to get schedule, due to no openings. ...Please leave message if unable to reach Pt. With Time and date of appt.  Pt. would like a 10:00 appt. ..[phone] (home)

## 2018-06-05 ENCOUNTER — TELEPHONE (OUTPATIENT)
Dept: PODIATRY | Facility: CLINIC | Age: 83
End: 2018-06-05

## 2018-06-05 NOTE — TELEPHONE ENCOUNTER
----- Message from Danuta Santana sent at 6/5/2018  4:29 PM CDT -----  Contact: pt  She's calling in regards to appt pls call pt back at [phone] (home)

## 2018-06-05 NOTE — TELEPHONE ENCOUNTER
Returned pt's call, pt stated she needed an appointment at [facility] clinic in the morning on Monday, Wednesday, or Friday. Pt was informed at this time the next morning appointment on one of those days wont be until August. Pt stated she will keep her original appointment, call ended pleasantly.  Ingrid Dos Santos MA  Podiatry Department, [facility]

## 2018-06-13 ENCOUNTER — OFFICE VISIT (OUTPATIENT)
Dept: URGENT CARE | Facility: CLINIC | Age: 83
End: 2018-06-13
Payer: MEDICARE

## 2018-06-13 VITALS
HEART RATE: 67 BPM | RESPIRATION RATE: 19 BRPM | WEIGHT: 152.25 LBS | HEIGHT: 63 IN | DIASTOLIC BLOOD PRESSURE: 76 MMHG | TEMPERATURE: 98 F | OXYGEN SATURATION: 98 % | SYSTOLIC BLOOD PRESSURE: 146 MMHG | BODY MASS INDEX: 26.98 KG/M2

## 2018-06-13 DIAGNOSIS — Z87.898 H/O URINARY FREQUENCY: ICD-10-CM

## 2018-06-13 DIAGNOSIS — R39.89 URINE DISCOLORATION: Primary | ICD-10-CM

## 2018-06-13 PROBLEM — M12.811 ROTATOR CUFF ARTHROPATHY, RIGHT: Status: ACTIVE | Noted: 2018-05-21

## 2018-06-13 PROBLEM — M65.331 TRIGGER FINGER, RIGHT MIDDLE FINGER: Status: ACTIVE | Noted: 2018-03-20

## 2018-06-13 PROCEDURE — 99999 PR PBB SHADOW E&M-EST. PATIENT-LVL III: CPT | Mod: PBBFAC,,, | Performed by: NURSE PRACTITIONER

## 2018-06-13 PROCEDURE — 99214 OFFICE O/P EST MOD 30 MIN: CPT | Mod: S$PBB,,, | Performed by: NURSE PRACTITIONER

## 2018-06-13 PROCEDURE — 99213 OFFICE O/P EST LOW 20 MIN: CPT | Mod: PBBFAC,PO | Performed by: NURSE PRACTITIONER

## 2018-06-13 RX ORDER — SULFAMETHOXAZOLE AND TRIMETHOPRIM 800; 160 MG/1; MG/1
1 TABLET ORAL 2 TIMES DAILY
Qty: 6 TABLET | Refills: 0 | Status: SHIPPED | OUTPATIENT
Start: 2018-06-13 | End: 2018-06-16

## 2018-06-13 NOTE — PATIENT INSTRUCTIONS

## 2018-06-13 NOTE — PROGRESS NOTES
Subjective:       Patient ID: Rosy Balderrama is a 86 y.o. female.    Chief Complaint: Urinary Tract Infection    Urinary Tract Infection    This is a recurrent problem. The current episode started in the past 7 days (2 days). The patient is experiencing no pain. There has been no fever. Associated symptoms include frequency (chronic ) and urgency. Pertinent negatives include no chills, flank pain, hematuria, hesitancy, nausea or vomiting.     Review of Systems   Constitutional: Negative for chills, diaphoresis, fatigue and fever.   Gastrointestinal: Negative for nausea and vomiting.   Genitourinary: Positive for frequency (chronic ) and urgency. Negative for difficulty urinating, dysuria, flank pain, hematuria and hesitancy.        Darkened urine   Musculoskeletal: Negative for back pain and myalgias.   Neurological: Negative for dizziness and weakness.       Objective:      Physical Exam   Constitutional: She is oriented to person, place, and time. She appears well-developed and well-nourished. No distress.   Abdominal: Soft. Normal appearance. There is no tenderness. There is no CVA tenderness.   Neurological: She is alert and oriented to person, place, and time.   Skin: Skin is warm and dry. She is not diaphoretic.       Assessment:       1. Urine discoloration    2. H/O urinary frequency        Plan:   Rosy was seen today for urinary tract infection.    Diagnoses and all orders for this visit:    Urine discoloration  -     POCT urine dipstick without microscope  -     Urine culture  -     sulfamethoxazole-trimethoprim 800-160mg (BACTRIM DS) 800-160 mg Tab; Take 1 tablet by mouth 2 (two) times daily.    H/O urinary frequency          -   Diagnosis, treatment, AVS reviewed with patient .   -   Urine sample could not be obtained; discussed possible causes of symptoms (decreased fluid intake), advised hydration/return with urine, she prefers abx tx; explained that urine would be needed if symptoms do not resolve  -    Advised to increase fluid intake and empty bladder frequently.   -   Follow up with PCP or ER immediately for no improvement in 3 days, worsening, new symptoms.   -   Patient understands and agrees with plan

## 2018-06-26 ENCOUNTER — OFFICE VISIT (OUTPATIENT)
Dept: PODIATRY | Facility: CLINIC | Age: 83
End: 2018-06-26
Payer: MEDICARE

## 2018-06-26 VITALS — HEIGHT: 63 IN | WEIGHT: 148.38 LBS | BODY MASS INDEX: 26.29 KG/M2

## 2018-06-26 DIAGNOSIS — L84 CORN OR CALLUS: ICD-10-CM

## 2018-06-26 DIAGNOSIS — B35.1 DERMATOPHYTOSIS OF NAIL: Primary | ICD-10-CM

## 2018-06-26 DIAGNOSIS — E11.49 TYPE II DIABETES MELLITUS WITH NEUROLOGICAL MANIFESTATIONS: ICD-10-CM

## 2018-06-26 PROCEDURE — 99213 OFFICE O/P EST LOW 20 MIN: CPT | Mod: 25,S$PBB,, | Performed by: PODIATRIST

## 2018-06-26 PROCEDURE — 99214 OFFICE O/P EST MOD 30 MIN: CPT | Mod: PBBFAC,PO | Performed by: PODIATRIST

## 2018-06-26 PROCEDURE — 99999 PR PBB SHADOW E&M-EST. PATIENT-LVL IV: CPT | Mod: PBBFAC,,, | Performed by: PODIATRIST

## 2018-06-26 PROCEDURE — 11721 DEBRIDE NAIL 6 OR MORE: CPT | Mod: Q9,PBBFAC,PO | Performed by: PODIATRIST

## 2018-06-26 PROCEDURE — 11721 DEBRIDE NAIL 6 OR MORE: CPT | Mod: Q9,S$PBB,, | Performed by: PODIATRIST

## 2018-06-26 NOTE — PROGRESS NOTES
[facility]  PODIATRIC MEDICINE AND SURGERY  PROGRESS NOTE     CHIEF COMPLAINT  Chief Complaint   Patient presents with    Routine Foot Care     last visit with PCP Dr. Barrios 6/13/18         HPI    SUBJECTIVE: Rosy Balderrama is a 86 y.o. female who  has a past medical history of *Atrial fibrillation; Allergy; Blood transfusion; Cataract; Diabetes mellitus type II; Hyperlipidemia; and Osteoporosis. Rosypresents to clinic for high risk diabetic foot exam and care.  Rosy admits numbness, burning, and/or tingling sensations in their feet. Patient admits to painful toenails aggravated by increased weight bearing, shoe gear, and pressure. States pain is relieved with routine debridements. Patient has no other pedal complaints at this time.    This patient has documented high risk feet requiring routine maintenance secondary to diabetes mellitis and those secondary complications of diabetes, as mentioned.      HgA1c: No results found for: HGBA1C      PMH  Past Medical History:   Diagnosis Date    *Atrial fibrillation     Allergy     Blood transfusion     Cataract     Diabetes mellitus type II     Hyperlipidemia     Osteoporosis      Patient Active Problem List   Diagnosis    Diabetes mellitus type 2, controlled, without complications    Hyperlipidemia    Rotator cuff arthropathy, right    Trigger finger, right middle finger       MEDS  Current Outpatient Prescriptions on File Prior to Visit   Medication Sig Dispense Refill    acetaminophen (TYLENOL) 500 MG tablet Take by mouth. 1 Tablet Oral      AMINO ACIDS/MINERALS (A/G PRO ORAL) Take by mouth.      ammonium lactate 12 % Crea Apply topically.      ANTIOX#10/OM3/DHA/EPA/LUT/ZEAX (I-CAPS ORAL) Take by mouth.      ASCORBATE CALCIUM (VITAMIN C ORAL) Take by mouth.      ascorbic acid (VITAMIN C) 500 MG tablet Take 500 mg by mouth once daily.      aspirin 81 MG Chew Take 81 mg by mouth once daily.      bumetanide (BUMEX) 1 MG tablet        CALCIUM CARBONATE/VITAMIN D3 (CALTRATE 600 + D ORAL) Take by mouth.      carboxymethylcellulose (REFRESH PLUS) 0.5 % Dpet 1 drop 3 (three) times daily as needed.      diclofenac (VOLTAREN) 50 MG EC tablet Take 50 mg by mouth 2 (two) times daily.      fluocinolone (DERMA-SMOOTHE) 0.01 % external oil Apply topically 3 (three) times daily.      fluticasone (FLONASE) 50 mcg/actuation nasal spray       folic acid-vit B6-vit B12 2.5-25-2 mg (FOLBIC) 2.5-25-2 mg Tab Take 1 tablet by mouth once daily.      FREESTYLE LANCETS 28 gauge lancets       FREESTYLE LITE STRIPS Strp       METHYL SALICYLATE/MENTHOL (BANALG-HS LINIMENT TOP) Apply topically.      metoprolol succinate (TOPROL-XL) 25 MG 24 hr tablet Take 25 mg by mouth once daily.      metoprolol tartrate (LOPRESSOR) 25 MG tablet       msm-aloe vera-herbal66-emu oil Gel Apply topically.      MULTIVITAMIN ORAL Take by mouth. Vitamin I      mupirocin (BACTROBAN) 2 % ointment 2 (two) times daily. Apply to affected area  0    oxybutynin (DITROPAN) 5 MG Tab       peg 400-propylene glycol, PF, (SYSTANE, PF,) 0.4-0.3 % Dpet Apply to eye.      polyethylene glycol (GLYCOLAX) 17 gram PwPk Take by mouth.      pravastatin (PRAVACHOL) 40 MG tablet       PROPYLENE GLYCOL/ (SYSTANE OPHT) Apply to eye.      sitagliptan (JANUVIA) 100 MG Tab Take 100 mg by mouth once daily.      sodium chloride 0.9 % SprA by Nasal route.      SSD 1 % cream       tramadol (ULTRAM) 50 mg tablet       triamcinolone acetonide 0.1% (KENALOG) 0.1 % cream Apply topically 2 (two) times daily.      urea (X-VIATE) 40 % Crea Apply topically once daily. Apply topically to bottom of foot for calluses 198.6 g 4     No current facility-administered medications on file prior to visit.        PSH     Past Surgical History:   Procedure Laterality Date    ANKLE SURGERY      broken ankle    BILATERAL SALPINGOOPHORECTOMY      BREAST BIOPSY      CARPAL TUNNEL RELEASE      CHOLECYSTECTOMY    "   COLONOSCOPY      ERCP      ESOPHAGOGASTRODUODENOSCOPY      FRACTURE SURGERY      KNEE CARTILAGE SURGERY      TOTAL ABDOMINAL HYSTERECTOMY      TOTAL HIP ARTHROPLASTY      TUBAL LIGATION          ALL  Review of patient's allergies indicates:   Allergen Reactions    Alendronate Other (See Comments)    Alendronate sodium     Atorvastatin Other (See Comments)    Ciprofloxacin Other (See Comments)    Codeine Itching    Diazepam Itching    Ibuprofen Other (See Comments)    Oxycodone-acetaminophen Other (See Comments) and Itching    Rofecoxib Other (See Comments)    Simvastatin Other (See Comments)       SOC     Social History   Substance Use Topics    Smoking status: Never Smoker    Smokeless tobacco: Never Used    Alcohol use No         Family HX  Family History   Problem Relation Age of Onset    Cancer Mother     Cancer Sister     Diabetes Brother             REVIEW OF SYSTEMS  General: Denies any fever or chills  Chest: Denies shortness of breath, wheezing, coughing, or sputum production  Heart: Denies chest pain.  As noted above and per history of current illness above, otherwise negative in the remainder of the 14 systems.     PHYSICAL EXAM  Vitals:    06/26/18 0711   Weight: 67.3 kg (148 lb 5.9 oz)   Height: 5' 3" (1.6 m)   PainSc: 0-No pain       GEN:  This patient is well-developed, well-nourished and appears stated age, well-oriented to person, place and time, and cooperative and pleasant on today's visit.      LOWER EXTREMITY  Vascular:   · DP pedal pulse 2/4 b/l, PT pedal pulse 1/4 b/l  · Skin temperature warm to warm from prox to distally  · CFT <5 secs b/l  · There is no edema noted b/l.      Dermatologic:   · Thickened, dystrophic, elongated toenails with subungal debris 1-5 b/l.   · No open skin lesions noted  · No erythema or drainage noted b/l.  · There is diffuse hyperkeratosis plantar foot b/l   · Webspaces are C/D/I B/L.  · Skin texture and turgor dry   "   Neurologic:  · Vibratory sensation diminished at level of Hallux IPJ b/l   · Protective sensation absent at 0/10 sites upon examination with [place] Weinsten 5.07 g monofilament.   · Propioception intact at 1st MTPJ b/l.   · Achilles and patellar deep tendon reflexes intact  · Babinski reflex absent b/l. Light touch and sharp/dull sensation intact b/l.     Musculoskeletal/Orthopedic:  · No symptomatic structural abnormalities noted.   · Fat pad atrophy b/l  · Plantar metatarsal heads 1-5 b/l   · Muscle strength is 5/5 for foot inverters, everters, plantarflexors, and dorsiflexors. Muscle tone is normal.  · Ankle joint ROM decreased with knee extended and flexed b/l no pain or crepitus throughout ROM of AJ.        ASSESSMENT  1. Dermatophytosis of nail  2. DM with neurological manifestations          Plan:  -Discuss presenting problems, etiology, pathologic processes and management options with patient today.   -I counseled the patient on their conditions, their implications and medical management.  and an in depth discussion on diabetic management, risk prevention, amputation prevention verbally and provided educational literature in written format.  -With patient's permission, nails were aggressively reduced and debrided x 10 to their soft tissue attachment mechanically and with electric , removing all offending nail and debris. Patient relates relief following the procedure. Patient will continue to monitor the areas daily, inspect feet, wear protective shoe gear when ambulatory, moisturizer to maintain skin integrity.  -AMLACTIN twice days   - Shoe inspection. Diabetic Foot Education. Patient reminded of the importance of good nutrition and blood sugar control to help prevent podiatric complications of diabetes. Patient instructed on proper foot hygeine. We discussed wearing proper shoe gear, daily foot inspections, never walking without protective shoe gear, never putting sharp instruments to feet,  routine podiatric nail visits every 2-3 months.        Future Appointments  Date Time Provider Department Center   9/28/2018 9:40 AM Amanda Goss DPM [facility]         Report Electronically Signed By:  Amanda Goss DPM   Podiatric Medicine & Surgery  [facility]  6/26/2018

## 2018-07-02 ENCOUNTER — TELEPHONE (OUTPATIENT)
Dept: PODIATRY | Facility: CLINIC | Age: 83
End: 2018-07-02

## 2018-07-02 NOTE — TELEPHONE ENCOUNTER
Returned pt's call, pt stated that she would like prescription for diabetic shoes sent to [facility]. Pt was informed will informed Dr. Goss to place order for diabetic shoes and will contact patient once faxed to [facility]. Pt expressed understanding, call ended pleasantly.  Ingrid Dos Santos MA  Podiatry Department, [facility]

## 2018-07-02 NOTE — TELEPHONE ENCOUNTER
----- Message from Arnaud De La Torre sent at 7/2/2018  8:23 AM CDT -----  Contact: pt  She's calling in regards to faxing orders for Diabetic shoes, [phone] (home), if no answer pls leave a message...

## 2018-07-03 ENCOUNTER — TELEPHONE (OUTPATIENT)
Dept: PODIATRY | Facility: CLINIC | Age: 83
End: 2018-07-03

## 2018-07-03 NOTE — TELEPHONE ENCOUNTER
Returned call to pt in reference to diabetic shoes. Request that prescription be faxed to [facility]. I verbalized understanding and information will be faxed as requested.

## 2018-07-03 NOTE — TELEPHONE ENCOUNTER
----- Message from Brad Tillman sent at 7/3/2018  1:38 PM CDT -----  Contact: pt   Pt calling in reference to getting a referral for shoes as discussed in last visit. pls return call     ..[phone] (home)

## 2018-07-05 ENCOUNTER — TELEPHONE (OUTPATIENT)
Dept: PODIATRY | Facility: CLINIC | Age: 83
End: 2018-07-05

## 2018-07-05 NOTE — TELEPHONE ENCOUNTER
----- Message from Halina Steen sent at 7/5/2018  9:46 AM CDT -----  Contact: Tessie/Gregory Kelley  Please call Tessie @ [phone] regarding pt order for diabetic shoes, states pt is not eligible until October, pt rec shoes in October 2017..

## 2018-07-05 NOTE — TELEPHONE ENCOUNTER
Returned call to Tessie berry [facility] in reference to diabetic shoe prescription. States that pt is not eligible for diabetic shoes until October. Informed that pt will notified. Verbalized understanding.    Attempted to contact pt but there was no answer. Voicemail was left to return call to clinic.

## 2018-07-05 NOTE — TELEPHONE ENCOUNTER
Pt educated that diabetic shoe prescription cannot be filled until October. Verbalized understanding.

## 2018-09-24 ENCOUNTER — OFFICE VISIT (OUTPATIENT)
Dept: PODIATRY | Facility: CLINIC | Age: 83
End: 2018-09-24
Payer: MEDICARE

## 2018-09-24 VITALS
BODY MASS INDEX: 26.28 KG/M2 | HEIGHT: 63 IN | DIASTOLIC BLOOD PRESSURE: 77 MMHG | RESPIRATION RATE: 16 BRPM | SYSTOLIC BLOOD PRESSURE: 144 MMHG | HEART RATE: 58 BPM

## 2018-09-24 DIAGNOSIS — L84 CORN OR CALLUS: ICD-10-CM

## 2018-09-24 DIAGNOSIS — M24.572 CONTRACTURE, LEFT ANKLE: ICD-10-CM

## 2018-09-24 DIAGNOSIS — M24.571 CONTRACTURE, RIGHT ANKLE: ICD-10-CM

## 2018-09-24 DIAGNOSIS — M77.41 METATARSALGIA, RIGHT FOOT: ICD-10-CM

## 2018-09-24 DIAGNOSIS — M20.42 HAMMER TOES OF BOTH FEET: ICD-10-CM

## 2018-09-24 DIAGNOSIS — B35.1 DERMATOPHYTOSIS OF NAIL: ICD-10-CM

## 2018-09-24 DIAGNOSIS — M20.41 HAMMER TOES OF BOTH FEET: ICD-10-CM

## 2018-09-24 DIAGNOSIS — M77.42 METATARSALGIA OF LEFT FOOT: ICD-10-CM

## 2018-09-24 DIAGNOSIS — M19.071 OSTEOARTHRITIS OF RIGHT ANKLE OR FOOT: ICD-10-CM

## 2018-09-24 DIAGNOSIS — E11.49 TYPE II DIABETES MELLITUS WITH NEUROLOGICAL MANIFESTATIONS: Primary | ICD-10-CM

## 2018-09-24 DIAGNOSIS — M19.072 OSTEOARTHRITIS OF LEFT ANKLE OR FOOT: ICD-10-CM

## 2018-09-24 PROCEDURE — 11056 PARNG/CUTG B9 HYPRKR LES 2-4: CPT | Mod: S$PBB,Q8,, | Performed by: PODIATRIST

## 2018-09-24 PROCEDURE — 11719 TRIM NAIL(S) ANY NUMBER: CPT | Mod: 59,Q8,S$PBB, | Performed by: PODIATRIST

## 2018-09-24 PROCEDURE — 11720 DEBRIDE NAIL 1-5: CPT | Mod: PBBFAC | Performed by: PODIATRIST

## 2018-09-24 PROCEDURE — 11056 PARNG/CUTG B9 HYPRKR LES 2-4: CPT | Mod: 59,PBBFAC | Performed by: PODIATRIST

## 2018-09-24 PROCEDURE — 99213 OFFICE O/P EST LOW 20 MIN: CPT | Mod: 25,S$PBB,, | Performed by: PODIATRIST

## 2018-09-24 PROCEDURE — 99999 PR PBB SHADOW E&M-EST. PATIENT-LVL III: CPT | Mod: PBBFAC,,, | Performed by: PODIATRIST

## 2018-09-24 PROCEDURE — 11720 DEBRIDE NAIL 1-5: CPT | Mod: S$PBB,59,Q8, | Performed by: PODIATRIST

## 2018-09-24 PROCEDURE — 11719 TRIM NAIL(S) ANY NUMBER: CPT | Mod: 59,PBBFAC | Performed by: PODIATRIST

## 2018-09-24 PROCEDURE — 99213 OFFICE O/P EST LOW 20 MIN: CPT | Mod: PBBFAC | Performed by: PODIATRIST

## 2018-09-24 NOTE — PROGRESS NOTES
Subjective:       Patient ID: Rosy Balderrama is a 86 y.o. female.    Chief Complaint: Routine Foot Care (4 Mo RFC, no c/o pain, wears Diabetic Shoes, Diabetic Pt, PCP Dr. Kate)      HPI: Patient presents to the office with the chief complaint of elongated, thickened and dystrophic nail plates to the B/L foot. Patient also complains of calluses to the left and/or right foot. This patient is a Diabetic Type II, complicated with Peripheral Neuropathy. Patient does follow with Primary Care and/or Endocrinology for management of Diabetes Mellitus. This patient's PMD is Jeffry Barrios MD. This patient last saw his/her primary care provider approx. 1 month ago. Patient also request a Rx. for DM shoes.     No results found for: HGBA1C.     Review of patient's allergies indicates:   Allergen Reactions    Alendronate Other (See Comments)    Alendronate sodium     Atorvastatin Other (See Comments)    Ciprofloxacin Other (See Comments)    Codeine Itching    Diazepam Itching    Ibuprofen Other (See Comments)    Oxycodone-acetaminophen Other (See Comments) and Itching    Rofecoxib Other (See Comments)    Simvastatin Other (See Comments)       Past Medical History:   Diagnosis Date    *Atrial fibrillation     Allergy     Blood transfusion     Cataract     Diabetes mellitus type II     Hyperlipidemia     Osteoporosis        Family History   Problem Relation Age of Onset    Cancer Mother     Cancer Sister     Diabetes Brother        Social History     Socioeconomic History    Marital status:      Spouse name: Not on file    Number of children: Not on file    Years of education: Not on file    Highest education level: Not on file   Social Needs    Financial resource strain: Not on file    Food insecurity - worry: Not on file    Food insecurity - inability: Not on file    Transportation needs - medical: Not on file    Transportation needs - non-medical: Not on file   Occupational History     "Occupation: Retired/Housewife     Comment: Housewife   Tobacco Use    Smoking status: Never Smoker    Smokeless tobacco: Never Used   Substance and Sexual Activity    Alcohol use: No    Drug use: No    Sexual activity: Not on file   Other Topics Concern    Not on file   Social History Narrative    Patient is a housewife.       Past Surgical History:   Procedure Laterality Date    ANKLE SURGERY      broken ankle    BILATERAL SALPINGOOPHORECTOMY      BREAST BIOPSY      CARPAL TUNNEL RELEASE      CHOLECYSTECTOMY      COLONOSCOPY      ERCP      ESOPHAGOGASTRODUODENOSCOPY      FRACTURE SURGERY      KNEE CARTILAGE SURGERY      TOTAL ABDOMINAL HYSTERECTOMY      TOTAL HIP ARTHROPLASTY      TUBAL LIGATION         Review of Systems   Constitutional: Negative for chills, fatigue and fever.   HENT: Negative for hearing loss.    Eyes: Negative for photophobia and visual disturbance.   Respiratory: Negative for cough, chest tightness, shortness of breath and wheezing.    Cardiovascular: Positive for leg swelling. Negative for chest pain and palpitations.   Gastrointestinal: Negative for constipation, diarrhea, nausea and vomiting.   Endocrine: Negative for cold intolerance and heat intolerance.   Genitourinary: Negative for flank pain.   Musculoskeletal: Positive for arthralgias, back pain and gait problem. Negative for neck pain and neck stiffness.   Skin: Negative for wound.   Neurological: Positive for numbness. Negative for light-headedness and headaches.   Psychiatric/Behavioral: Negative for sleep disturbance.          Objective:   BP (!) 144/77 (BP Location: Left arm, Patient Position: Sitting, BP Method: Medium (Automatic))   Pulse (!) 58   Resp 16   Ht 5' 3" (1.6 m)   BMI 26.28 kg/m²     LOWER EXTREMITY PHYSICAL EXAMINATION    NEUROLOGY: Protective sensation is not intact to the left and right plantar surfaces of the foot and digits, as the patient has no sensation/detection at greater than 4 " distinct points of contact with 5.07 [place] Liliana monofilament. Sensation to light touch is intact on the left and right foot. Proprioception is intact, bilateral. Sensation to pin prick is reduced to absent. Vibratory sensation is diminished    DERMATOLOGY: On the left foot, nails 1 and 2 are suggestive of onychomycotic changes. On the right foot, nails 1 are suggestive of onychomycotic changes. These nail plates are thickened, are dystrophic, chaulky in appearance and malodorous with substantial subungual debris. These nail plates are yellow to brown in appearance. The remaining nail plates are elongated and do not have suggestive clinical features of onychomycosis. Callus formation, Plantar B/L 1st MTPJ. Mild xerosis is noted. No tinea is noted. No ulcerations.      ORTHOPEDIC: Manual Muscle Testing is 5/5 in all planes on the left and right, without pains, with and without resistance. Gait pattern is non-antalgic.    VASCULAR: Hair growth is sparse on the left and right dorsal foot and at the digits. The left dorsalis pedis pulse is 1/4 and on the right is 1/4, and the left posterior tibial pulse is  1/4 on the left and is  1/4 on the right. Varicosities noted. Spider veins are noted to the bilateral lower extremity. Warm to warm, proximal to distal. Capillary refill time is within normal limits and less  than 3 seconds.     Physical Exam    Assessment:     1. Type II diabetes mellitus with neurological manifestations    2. Dermatophytosis of nail    3. Corn or callus    4. Metatarsalgia, right foot    5. Metatarsalgia of left foot    6. Contracture, right ankle    7. Contracture, left ankle    8. Hammer toes of both feet    9. Osteoarthritis of left ankle or foot    10. Osteoarthritis of right ankle or foot        Plan:     Type II diabetes mellitus with neurological manifestations  Patient advised to follow up with Primary Care Physician for management of comorbid states.  (Diabetic) Foot counseling and  education is provided at this visit. Patient is advised to wear socks and shoes at all times.  Do not walk barefoot, or with just socks, even when indoors.  Be sure to check and inspect the inside of the shoe before putting them on her feet.  Protect your feet at all times.  Walking shoes and/or athletic shoes are the best types of shoe gear. Do not wear vinyl or plastic type shoe gear, as they do not stretch and/or breathe.  Protect your feet from hot and/or cold. Elevate the extremities when sitting.  Do not wear excessively tight socks and/or shoe gear. Wiggle your toes for a few minutes throughout the day. Move your ankles up and down, in and out, to help blood flow in your lower extremity.     Dermatophytosis of nail  The onychomycotic nail plates, as outlined above, are sharply debrided with double action nail nipper, and/or with the assistance of a mechanical rotary racquel, with removal of all offending nail and nail border(s), for reduction of pains. Nails are reduced in terms of length, width and girth with removal of subungual debris to facilitate pain free weight bearing and ambulation. The elongated nails as outlined in the objective portion of this note, were trimmed to appropriate length, with a double action nail nipper, for alleviation/reduction of pains as well. Follow up in approx. 3-4 months.    Corn or callus  Hypertrophic skin formation, as outlined within the examination portion of this note, is/are trimmed/pared surgically debrided with sharp #10/#15 blade, to alleviate discomfort with weight bearing and ambulation, and to lessen the possibility of skin complications, e.g., ulceration due to pressure. No ulceration(s) is are noted with/post debridement.     Metatarsalgia, right foot  Metatarsalgia of left foot  Contracture, right ankle  Contracture, left ankle  Hammer toes of both feet  Osteoarthritis of left ankle or foot  Osteoarthritis of right ankle or foot  Patient meets criteria for  fabrication/dispensing of diabetic shoe gear as he/she has moderate risk for complications of the disease state due to peripheral neuropathy. Patient also has foot deformity; hammertoe contractures, metatarsalgia, equinus contracture, and osteoarthropathy. The importance of daily foot assessment and care is thoroughly explained to the patient who acknowledges understanding. Patient to practice strict diet and glucose control as to decrease the risk of complications due to diabetes. Frequent follow up with primary care provider is stressed to the patient. Patient will follow up with out-patient Orthotist for fabrication. Please follow up approx. 4-6 weeks after obtaining the shoe gear for assessment.     Protective Sensation (w/ 10 gram monofilament):  Right: Absent  Left: Absent    Visual Inspection:  Dry Skin -  Bilateral and Onychomycosis -  Bilateral    Pedal Pulses:   Right: Diminshed  Left: Diminshed    Posterior tibialis:   Right:Absent  Left: Absent            Future Appointments   Date Time Provider Department Center   9/24/2018 10:00 AM Daron Leonardo DPM [facility]

## 2019-02-04 ENCOUNTER — OFFICE VISIT (OUTPATIENT)
Dept: PODIATRY | Facility: CLINIC | Age: 84
End: 2019-02-04
Payer: MEDICARE

## 2019-02-04 VITALS
WEIGHT: 148.25 LBS | HEART RATE: 72 BPM | BODY MASS INDEX: 26.27 KG/M2 | HEIGHT: 63 IN | SYSTOLIC BLOOD PRESSURE: 130 MMHG | DIASTOLIC BLOOD PRESSURE: 72 MMHG

## 2019-02-04 DIAGNOSIS — B35.1 DERMATOPHYTOSIS OF NAIL: ICD-10-CM

## 2019-02-04 DIAGNOSIS — E11.49 TYPE II DIABETES MELLITUS WITH NEUROLOGICAL MANIFESTATIONS: Primary | ICD-10-CM

## 2019-02-04 DIAGNOSIS — L84 CORN OR CALLUS: ICD-10-CM

## 2019-02-04 PROCEDURE — 99999 PR PBB SHADOW E&M-EST. PATIENT-LVL III: CPT | Mod: PBBFAC,,, | Performed by: PODIATRIST

## 2019-02-04 PROCEDURE — 11056 PR TRIM BENIGN HYPERKERATOTIC SKIN LESION,2-4: ICD-10-PCS | Mod: S$PBB,,, | Performed by: PODIATRIST

## 2019-02-04 PROCEDURE — 11720 DEBRIDE NAIL 1-5: CPT | Mod: 59,S$PBB,, | Performed by: PODIATRIST

## 2019-02-04 PROCEDURE — 99499 UNLISTED E&M SERVICE: CPT | Mod: S$PBB,,, | Performed by: PODIATRIST

## 2019-02-04 PROCEDURE — 11720 DEBRIDE NAIL 1-5: CPT | Mod: 59,PBBFAC | Performed by: PODIATRIST

## 2019-02-04 PROCEDURE — 11056 PARNG/CUTG B9 HYPRKR LES 2-4: CPT | Mod: S$PBB,,, | Performed by: PODIATRIST

## 2019-02-04 PROCEDURE — 11720 PR DEBRIDEMENT OF NAIL(S), 1-5: ICD-10-PCS | Mod: 59,S$PBB,, | Performed by: PODIATRIST

## 2019-02-04 PROCEDURE — 99999 PR PBB SHADOW E&M-EST. PATIENT-LVL III: ICD-10-PCS | Mod: PBBFAC,,, | Performed by: PODIATRIST

## 2019-02-04 PROCEDURE — 99213 OFFICE O/P EST LOW 20 MIN: CPT | Mod: PBBFAC,25 | Performed by: PODIATRIST

## 2019-02-04 PROCEDURE — 11719 TRIM NAIL(S) ANY NUMBER: CPT | Mod: 59,S$PBB,, | Performed by: PODIATRIST

## 2019-02-04 PROCEDURE — 11719 PR TRIM NAIL(S): ICD-10-PCS | Mod: 59,S$PBB,, | Performed by: PODIATRIST

## 2019-02-04 PROCEDURE — 11056 PARNG/CUTG B9 HYPRKR LES 2-4: CPT | Mod: 59,PBBFAC | Performed by: PODIATRIST

## 2019-02-04 PROCEDURE — 11719 TRIM NAIL(S) ANY NUMBER: CPT | Mod: PBBFAC | Performed by: PODIATRIST

## 2019-02-04 PROCEDURE — 99499 NO LOS: ICD-10-PCS | Mod: S$PBB,,, | Performed by: PODIATRIST

## 2019-02-04 NOTE — PROGRESS NOTES
Subjective:       Patient ID: Rosy Balderrama is a 87 y.o. female.    Chief Complaint: Routine Foot Care (c/o bilateral possible callus on ball of foot. Rates pain 10/10. Diabetic PT,  wearing casual shoes with socks. PCP Denis)      HPI: Patient presents to the office with the chief complaint of elongated, thickened and dystrophic nail plates to the B/L foot. Patient also complains of calluses to the left and/or right foot. This patient is a Diabetic Type II, complicated with Peripheral Neuropathy. Patient does follow with Primary Care and/or Endocrinology for management of Diabetes Mellitus. This patient's PMD is Jeffry Barrios MD. This patient last saw his/her primary care provider, several weeks ago. Patient is ambulatory with the assistance of a walker.  Patient is ambulatory with diabetic shoes.    No results found for: HGBA1C.     Review of patient's allergies indicates:   Allergen Reactions    Alendronate Other (See Comments)    Alendronate sodium     Atorvastatin Other (See Comments)    Ciprofloxacin Other (See Comments)    Codeine Itching    Diazepam Itching    Ibuprofen Other (See Comments)    Oxycodone-acetaminophen Other (See Comments) and Itching    Rofecoxib Other (See Comments)    Simvastatin Other (See Comments)       Past Medical History:   Diagnosis Date    *Atrial fibrillation     Allergy     Blood transfusion     Cataract     Diabetes mellitus type II     Hyperlipidemia     Osteoporosis        Family History   Problem Relation Age of Onset    Cancer Mother     Cancer Sister     Diabetes Brother        Social History     Socioeconomic History    Marital status:      Spouse name: Not on file    Number of children: Not on file    Years of education: Not on file    Highest education level: Not on file   Social Needs    Financial resource strain: Not on file    Food insecurity - worry: Not on file    Food insecurity - inability: Not on file    Transportation  "needs - medical: Not on file    Transportation needs - non-medical: Not on file   Occupational History    Occupation: Retired/Housewife     Comment: Housewife   Tobacco Use    Smoking status: Never Smoker    Smokeless tobacco: Never Used   Substance and Sexual Activity    Alcohol use: No    Drug use: No    Sexual activity: Not on file   Other Topics Concern    Not on file   Social History Narrative    Patient is a housewife.       Past Surgical History:   Procedure Laterality Date    ANKLE SURGERY      broken ankle    BILATERAL SALPINGOOPHORECTOMY      BREAST BIOPSY      CARPAL TUNNEL RELEASE      CHOLECYSTECTOMY      COLONOSCOPY      ERCP      ESOPHAGOGASTRODUODENOSCOPY      FRACTURE SURGERY      KNEE CARTILAGE SURGERY      TOTAL ABDOMINAL HYSTERECTOMY      TOTAL HIP ARTHROPLASTY      TUBAL LIGATION         Review of Systems   Constitutional: Negative for chills, fatigue and fever.   HENT: Negative for hearing loss.    Eyes: Negative for photophobia and visual disturbance.   Respiratory: Negative for cough, chest tightness, shortness of breath and wheezing.    Cardiovascular: Negative for chest pain and palpitations.   Gastrointestinal: Negative for constipation, diarrhea, nausea and vomiting.   Endocrine: Negative for cold intolerance and heat intolerance.   Genitourinary: Negative for flank pain.   Musculoskeletal: Positive for arthralgias, back pain and gait problem. Negative for neck pain and neck stiffness.   Skin: Negative for wound.   Neurological: Positive for numbness. Negative for light-headedness and headaches.   Psychiatric/Behavioral: Negative for sleep disturbance.          Objective:   /72 (BP Location: Left arm, Patient Position: Sitting, BP Method: Small (Manual))   Pulse 72   Ht 5' 3" (1.6 m)   Wt 67.3 kg (148 lb 4.2 oz)   BMI 26.26 kg/m²     LOWER EXTREMITY PHYSICAL EXAMINATION    NEUROLOGY: Protective sensation is not intact to the left and right plantar surfaces " of the foot and digits, as the patient has no sensation/detection at greater than 4 distinct points of contact with 5.07 [place] Liliana monofilament. Sensation to light touch is intact on the left and right foot. Proprioception is intact, bilateral. Sensation to pin prick is reduced to absent. Vibratory sensation is diminished    DERMATOLOGY: On the left foot, nails 1 and 5 are suggestive of onychomycotic changes. On the right foot, nails 1 are suggestive of onychomycotic changes. These nail plates are thickened, are dystrophic, chaulky in appearance and malodorous with substantial subungual debris. These nail plates are yellow to brown in appearance. The remaining nail plates are elongated and do not have suggestive clinical features of onychomycosis. Callus formation, medial border, right great toe, plantar aspect of the left 1st metatarsophalangeal joint.     ORTHOPEDIC: Manual Muscle Testing is 5/5 in all planes on the left and right, without pains, with and without resistance. Gait pattern is non-antalgic.    Assessment:     1. Type II diabetes mellitus with neurological manifestations    2. Dermatophytosis of nail    3. Corn or callus        Plan:     Type II diabetes mellitus with neurological manifestations  Patient advised to follow up with Primary Care Physician for management of comorbid states.    (Diabetic) Foot counseling and education is provided at this visit. Patient is advised to wear socks and shoes at all times.  Do not walk barefoot, or with just socks, even when indoors.  Be sure to check and inspect the inside of the shoe before putting them on her feet.  Protect your feet at all times.  Walking shoes and/or athletic shoes are the best types of shoe gear. Do not wear vinyl or plastic type shoe gear, as they do not stretch and/or breathe.  Protect your feet from hot and/or cold. Elevate the extremities when sitting.  Do not wear excessively tight socks and/or shoe gear. Wiggle your toes for a  few minutes throughout the day. Move your ankles up and down, in and out, to help blood flow in your lower extremity.     Dermatophytosis of nail  The onychomycotic nail plates, as outlined above, are sharply debrided with double action nail nipper, and/or with the assistance of a mechanical rotary racquel, with removal of all offending nail and nail border(s), for reduction of pains. Nails are reduced in terms of length, width and girth with removal of subungual debris to facilitate pain free weight bearing and ambulation. The elongated nails as outlined in the objective portion of this note, were trimmed to appropriate length, with a double action nail nipper, for alleviation/reduction of pains as well. Follow up in approx. 3-4 months.    Corn or callus  Hypertrophic skin formation, as outlined within the examination portion of this note, is/are trimmed/pared surgically debrided with sharp #10/#15 blade, to alleviate discomfort with weight bearing and ambulation, and to lessen the possibility of skin complications, e.g., ulceration due to pressure. No ulceration(s) is are noted with/post debridement.         Future Appointments   Date Time Provider Department Center   5/6/2019 10:00 AM Daron Leonardo DPM [facility]

## 2019-04-15 DIAGNOSIS — M21.70 ACQUIRED UNEQUAL LIMB LENGTH: Primary | ICD-10-CM

## 2019-05-01 ENCOUNTER — TELEPHONE (OUTPATIENT)
Dept: PODIATRY | Facility: CLINIC | Age: 84
End: 2019-05-01

## 2019-05-01 NOTE — TELEPHONE ENCOUNTER
Trying calling pt left message for pt to call back please advise      Taylor Rebolledo MA  Podiatry Surgical Department

## 2019-06-03 ENCOUNTER — OFFICE VISIT (OUTPATIENT)
Dept: PODIATRY | Facility: CLINIC | Age: 84
End: 2019-06-03
Payer: MEDICARE

## 2019-06-03 VITALS
DIASTOLIC BLOOD PRESSURE: 86 MMHG | SYSTOLIC BLOOD PRESSURE: 129 MMHG | WEIGHT: 145.5 LBS | BODY MASS INDEX: 25.78 KG/M2 | HEART RATE: 78 BPM | RESPIRATION RATE: 17 BRPM | HEIGHT: 63 IN

## 2019-06-03 DIAGNOSIS — B35.1 DERMATOPHYTOSIS OF NAIL: ICD-10-CM

## 2019-06-03 DIAGNOSIS — L85.3 XEROSIS CUTIS: ICD-10-CM

## 2019-06-03 DIAGNOSIS — E11.49 TYPE II DIABETES MELLITUS WITH NEUROLOGICAL MANIFESTATIONS: Primary | ICD-10-CM

## 2019-06-03 DIAGNOSIS — L84 CORN OR CALLUS: ICD-10-CM

## 2019-06-03 PROCEDURE — 11056 PARNG/CUTG B9 HYPRKR LES 2-4: CPT | Mod: Q9,PBBFAC,59 | Performed by: PODIATRIST

## 2019-06-03 PROCEDURE — 11056 PR TRIM BENIGN HYPERKERATOTIC SKIN LESION,2-4: ICD-10-PCS | Mod: Q9,S$PBB,, | Performed by: PODIATRIST

## 2019-06-03 PROCEDURE — 11719 PR TRIM NAIL(S): ICD-10-PCS | Mod: 59,Q9,S$PBB, | Performed by: PODIATRIST

## 2019-06-03 PROCEDURE — 99213 OFFICE O/P EST LOW 20 MIN: CPT | Mod: PBBFAC | Performed by: PODIATRIST

## 2019-06-03 PROCEDURE — 11719 TRIM NAIL(S) ANY NUMBER: CPT | Mod: 59,Q9,PBBFAC | Performed by: PODIATRIST

## 2019-06-03 PROCEDURE — 99214 PR OFFICE/OUTPT VISIT, EST, LEVL IV, 30-39 MIN: ICD-10-PCS | Mod: 25,S$PBB,, | Performed by: PODIATRIST

## 2019-06-03 PROCEDURE — 11719 TRIM NAIL(S) ANY NUMBER: CPT | Mod: 59,Q9,S$PBB, | Performed by: PODIATRIST

## 2019-06-03 PROCEDURE — 99214 OFFICE O/P EST MOD 30 MIN: CPT | Mod: 25,S$PBB,, | Performed by: PODIATRIST

## 2019-06-03 PROCEDURE — 99999 PR PBB SHADOW E&M-EST. PATIENT-LVL III: ICD-10-PCS | Mod: PBBFAC,,, | Performed by: PODIATRIST

## 2019-06-03 PROCEDURE — 11720 PR DEBRIDEMENT OF NAIL(S), 1-5: ICD-10-PCS | Mod: 59,Q9,S$PBB, | Performed by: PODIATRIST

## 2019-06-03 PROCEDURE — 11056 PARNG/CUTG B9 HYPRKR LES 2-4: CPT | Mod: Q9,S$PBB,, | Performed by: PODIATRIST

## 2019-06-03 PROCEDURE — 11720 DEBRIDE NAIL 1-5: CPT | Mod: 59,Q9,PBBFAC | Performed by: PODIATRIST

## 2019-06-03 PROCEDURE — 11720 DEBRIDE NAIL 1-5: CPT | Mod: 59,Q9,S$PBB, | Performed by: PODIATRIST

## 2019-06-03 PROCEDURE — 99999 PR PBB SHADOW E&M-EST. PATIENT-LVL III: CPT | Mod: PBBFAC,,, | Performed by: PODIATRIST

## 2019-06-03 NOTE — PROGRESS NOTES
Subjective:       Patient ID: Rosy Balderrama is a 87 y.o. female.    Chief Complaint: Nail Care (Bilat foot care toenail cut down, pain 0/10, ambulation without rajat, diabetic, PCP Dr. Barrios)      HPI: Patient presents to the office with the chief complaint of elongated, thickened and dystrophic nail plates to the B/L foot. Patient also complains of calluses to the left and/or right foot. This patient is a Diabetic Type II, complicated with Peripheral Neuropathy. Patient does follow with Primary Care and/or Endocrinology for management of Diabetes Mellitus. This patient's PMD is Jeffry Barrios MD. This patient last saw his/her primary care provider, several weeks ago.    No results found for: HGBA1C.     Review of patient's allergies indicates:   Allergen Reactions    Alendronate Other (See Comments)    Alendronate sodium     Atorvastatin Other (See Comments)    Ciprofloxacin Other (See Comments)    Codeine Itching    Diazepam Itching    Ibuprofen Other (See Comments)    Oxycodone-acetaminophen Other (See Comments) and Itching    Rofecoxib Other (See Comments)    Simvastatin Other (See Comments)       Past Medical History:   Diagnosis Date    *Atrial fibrillation     Allergy     Blood transfusion     Cataract     Diabetes mellitus type II     Hyperlipidemia     Osteoporosis        Family History   Problem Relation Age of Onset    Cancer Mother     Cancer Sister     Diabetes Brother        Social History     Socioeconomic History    Marital status:      Spouse name: Not on file    Number of children: Not on file    Years of education: Not on file    Highest education level: Not on file   Occupational History    Occupation: Retired/Housewife     Comment: Housewife   Social Needs    Financial resource strain: Not on file    Food insecurity:     Worry: Not on file     Inability: Not on file    Transportation needs:     Medical: Not on file     Non-medical: Not on file   Tobacco  Use    Smoking status: Never Smoker    Smokeless tobacco: Never Used   Substance and Sexual Activity    Alcohol use: No    Drug use: No    Sexual activity: Not on file   Lifestyle    Physical activity:     Days per week: Not on file     Minutes per session: Not on file    Stress: Not on file   Relationships    Social connections:     Talks on phone: Not on file     Gets together: Not on file     Attends Yazdanism service: Not on file     Active member of club or organization: Not on file     Attends meetings of clubs or organizations: Not on file     Relationship status: Not on file   Other Topics Concern    Not on file   Social History Narrative    Patient is a housewife.       Past Surgical History:   Procedure Laterality Date    ANKLE SURGERY      broken ankle    BILATERAL SALPINGOOPHORECTOMY      BREAST BIOPSY      CARPAL TUNNEL RELEASE      CHOLECYSTECTOMY      COLONOSCOPY      ERCP      ESOPHAGOGASTRODUODENOSCOPY      FRACTURE SURGERY      KNEE CARTILAGE SURGERY      TOTAL ABDOMINAL HYSTERECTOMY      TOTAL HIP ARTHROPLASTY      TUBAL LIGATION         Review of Systems   Constitutional: Negative for chills, fatigue and fever.   HENT: Negative for hearing loss.    Eyes: Negative for photophobia and visual disturbance.   Respiratory: Negative for cough, chest tightness, shortness of breath and wheezing.    Cardiovascular: Negative for chest pain and palpitations.   Gastrointestinal: Negative for constipation, diarrhea, nausea and vomiting.   Endocrine: Negative for cold intolerance and heat intolerance.   Genitourinary: Negative for flank pain.   Musculoskeletal: Positive for arthralgias and gait problem. Negative for neck pain and neck stiffness.   Skin: Negative for wound.   Neurological: Positive for numbness. Negative for light-headedness and headaches.   Psychiatric/Behavioral: Negative for sleep disturbance.          Objective:   /86 (BP Location: Right arm, Patient Position:  "Sitting, BP Method: Medium (Automatic))   Pulse 78   Resp 17   Ht 5' 3" (1.6 m)   Wt 66 kg (145 lb 8.1 oz)   BMI 25.77 kg/m²     LOWER EXTREMITY PHYSICAL EXAMINATION    NEUROLOGY: Protective sensation is not intact to the left and right plantar surfaces of the foot and digits, as the patient has no sensation/detection at greater than 4 distinct points of contact with 5.07 [place] Liliana monofilament. Sensation to light touch is intact on the left and right foot. Proprioception is intact, bilateral. Sensation to pin prick is reduced to absent. Vibratory sensation is diminished    DERMATOLOGY: On the left foot, nails 1 and 5 are suggestive of onychomycotic changes. On the right foot, nails 1 are suggestive of onychomycotic changes. These nail plates are thickened, are dystrophic, chaulky in appearance and malodorous with substantial subungual debris. These nail plates are yellow to brown in appearance. The remaining nail plates are elongated and do not have suggestive clinical features of onychomycosis. Callus formation, bilateral plantar medial border of the 1st metatarsophalangeal joint, and the medial border left heel bone.  Moderate to severe xerosis is noted to the lower extremity.  No overt  evidence of tinea pedis noted.    ORTHOPEDIC: Manual Muscle Testing is 5/5 in all planes on the left and right, without pains, with and without resistance. Gait pattern is non-antalgic.    VASCULAR: Hair growth is sparse on the left and right dorsal foot and at the digits. The left dorsalis pedis pulse is 1/4 and on the right is 1/4, and the left posterior tibial pulse is 1/4 and is 1/4 on the right. Varicosities are noted. Spider veins are noted to the bilateral lower extremity. Warm to warm, proximal to distal. Capillary refill time is within normal limits and less  than 3 seconds.  Mild edema is appreciated.    Assessment:     1. Type II diabetes mellitus with neurological manifestations    2. Dermatophytosis of " nail    3. Corn or callus    4. Xerosis cutis        Plan:     Type II diabetes mellitus with neurological manifestations  I counseled the patient on his/her Diabetic Mellitus regarding today's clinical examination and objection findings. We did also discuss recent medication changes, pertinent labs and imaging evaluations and other medical consultation notes and progress notes. Greater than 50% of this visit was spent on counseling and coordination of care. Greater than 20 minutes of this appt. was spent on education about the diabetic foot, in relation to PVD and/or neuropathy, and the prevention of limb loss.     Shoe gear is inspected and wear and proper fit/type. Patient is reminded of the importance of good nutrition and blood sugar control to help prevent podiatric complications of diabetes. Patient instructed on proper foot hygeine. We discussed wearing proper shoe gear, daily foot inspections, never walking without protective shoe gear, never putting sharp instruments to feet.  Patient  will continue to monitor the areas daily, inspect feet, wear protective shoe gear when ambulatory, moisturizer to maintain skin integrity.     Patient's DMI/DMII is managed by Primary Care Provider and/or Endocrinology Advanced Practice Provider. As per the most recent glycohemoglobin level, this patient is at goal.    Dermatophytosis of nail  The onychomycotic nail plates, as outlined above, are sharply debrided with double action nail nipper, and/or with the assistance of a mechanical rotary racquel, with removal of all offending nail and nail border(s), for reduction of pains. Nails are reduced in terms of length, width and girth with removal of subungual debris to facilitate pain free weight bearing and ambulation. The elongated nails as outlined in the objective portion of this note, were trimmed to appropriate length, with a double action nail nipper, for alleviation/reduction of pains as well. Follow up in approx. 3-4  months.    Corn or callus  Hypertrophic skin formation, as outlined within the examination portion of this note, is/are trimmed/pared surgically debrided with sharp #10/#15 blade, to alleviate discomfort with weight bearing and ambulation, and to lessen the possibility of skin complications, e.g., ulceration due to pressure. No ulceration(s) is are noted with/post debridement.     Xerosis cutis  Prescription is written for Urea compounding cream/ointment/lotion. Prescription will be filled by [facility], [place], [place].           Future Appointments   Date Time Provider Department Center   6/3/2019 10:30 AM Daron Leonardo DPM [facility]   10/3/2019  9:15 AM Daron Leonardo DPM [facility]

## 2019-06-21 DIAGNOSIS — L85.3 XEROSIS CUTIS: Primary | ICD-10-CM

## 2019-06-21 RX ORDER — UREA 40 %
CREAM (GRAM) TOPICAL 2 TIMES DAILY
Qty: 198 G | Refills: 1 | Status: SHIPPED | OUTPATIENT
Start: 2019-06-21 | End: 2019-09-19

## 2019-08-09 ENCOUNTER — HOSPITAL ENCOUNTER (EMERGENCY)
Facility: HOSPITAL | Age: 84
Discharge: HOME | End: 2019-08-09
Attending: EMERGENCY MEDICINE
Payer: MEDICARE

## 2019-08-09 VITALS
RESPIRATION RATE: 16 BRPM | HEIGHT: 63 IN | TEMPERATURE: 98 F | DIASTOLIC BLOOD PRESSURE: 73 MMHG | WEIGHT: 142.63 LBS | HEART RATE: 67 BPM | SYSTOLIC BLOOD PRESSURE: 159 MMHG | BODY MASS INDEX: 25.27 KG/M2 | OXYGEN SATURATION: 97 %

## 2019-08-09 DIAGNOSIS — M79.642 LEFT HAND PAIN: ICD-10-CM

## 2019-08-09 PROCEDURE — 99283 EMERGENCY DEPT VISIT LOW MDM: CPT

## 2019-08-09 PROCEDURE — 25000003 PHARM REV CODE 250: Performed by: EMERGENCY MEDICINE

## 2019-08-09 RX ORDER — ACETAMINOPHEN 500 MG
1000 TABLET ORAL
Status: COMPLETED | OUTPATIENT
Start: 2019-08-09 | End: 2019-08-09

## 2019-08-09 RX ADMIN — ACETAMINOPHEN 1000 MG: 500 TABLET ORAL at 04:08

## 2019-08-09 NOTE — ED PROVIDER NOTES
SCRIBE #1 NOTE: I, Chris Ruiz, am scribing for, and in the presence of, John Wise MD. I have scribed the entire note.       History     Chief Complaint   Patient presents with    Insect Bite     pt states she was stung by bee on her L palm yesterday evening around 5pm; pt states it is still hurting so she thought she needed it evaluated     Review of patient's allergies indicates:   Allergen Reactions    Alendronate Other (See Comments)    Alendronate sodium     Atorvastatin Other (See Comments)    Ciprofloxacin Other (See Comments)    Codeine Itching    Diazepam Itching    Ibuprofen Other (See Comments)    Oxycodone-acetaminophen Other (See Comments) and Itching    Rofecoxib Other (See Comments)    Simvastatin Other (See Comments)         History of Present Illness     HPI    8/9/2019, 4:01 AM  History obtained from the patient      History of Present Illness: Rosy Balderrama is a 87 y.o. female patient with a PMHx of DM, HLD who presents to the Emergency Department for evaluation of an insect bite which onset 5PM this evening. Pt states that she opened the bathroom door and was stung on her L hand. Symptoms are constant and moderate in severity. No mitigating or exacerbating factors reported. No associated sxs reported. Patient denies any fever, chills, SOB, CP, n/v/d, dizziness. HA, light-headedness, numbness, weakness, syncope and all other sxs at this time. No further complaints or concerns at this time.         Arrival mode: Personal vehicle    PCP: Jeffry Barrios MD        Past Medical History:  Past Medical History:   Diagnosis Date    *Atrial fibrillation     Allergy     Blood transfusion     Cataract     Diabetes mellitus type II     Hyperlipidemia     Osteoporosis        Past Surgical History:  Past Surgical History:   Procedure Laterality Date    ANKLE SURGERY      broken ankle    BILATERAL SALPINGOOPHORECTOMY      BREAST BIOPSY      CARPAL TUNNEL RELEASE       CHOLECYSTECTOMY      COLONOSCOPY      ERCP      ESOPHAGOGASTRODUODENOSCOPY      FRACTURE SURGERY      KNEE CARTILAGE SURGERY      TOTAL ABDOMINAL HYSTERECTOMY      TOTAL HIP ARTHROPLASTY      TUBAL LIGATION           Family History:  Family History   Problem Relation Age of Onset    Cancer Mother     Cancer Sister     Diabetes Brother        Social History:  Social History     Tobacco Use    Smoking status: Never Smoker    Smokeless tobacco: Never Used   Substance and Sexual Activity    Alcohol use: No    Drug use: No    Sexual activity: Unknown        Review of Systems     Review of Systems   Constitutional: Negative for chills and fever.   HENT: Negative for sore throat.    Respiratory: Negative for shortness of breath.    Cardiovascular: Negative for chest pain.   Gastrointestinal: Negative for diarrhea, nausea and vomiting.   Genitourinary: Negative for dysuria.   Musculoskeletal: Negative for back pain.   Skin: Negative for rash.        (+) Insect sting   Neurological: Negative for dizziness, syncope, weakness, light-headedness, numbness and headaches.   Hematological: Does not bruise/bleed easily.   All other systems reviewed and are negative.       Physical Exam     Initial Vitals [08/09/19 0225]   BP Pulse Resp Temp SpO2   (!) 168/71 64 16 97.8 °F (36.6 °C) 97 %      MAP       --          Physical Exam  Nursing Notes and Vital Signs Reviewed.  Constitutional: Patient is in no acute distress. Well-developed and well-nourished.  Head: Atraumatic. Normocephalic.  Eyes: PERRL. EOM intact. Conjunctivae are not pale. No scleral icterus.  ENT: Mucous membranes are moist. Oropharynx is clear and symmetric.    Neck: Supple. Full ROM. No lymphadenopathy.  Cardiovascular: Regular rate. Regular rhythm. No murmurs, rubs, or gallops. Distal pulses are 2+ and symmetric.  Pulmonary/Chest: No respiratory distress. Clear to auscultation bilaterally. No wheezing or rales.  Abdominal: Soft and non-distended.   "There is no tenderness.  No rebound, guarding, or rigidity.  Genitourinary: No CVA tenderness  Musculoskeletal: Moves all extremities. No obvious deformities. No edema. No calf tenderness.  Skin: Warm and dry. Erythema on the thenar eminence of the L hand. No stinger present.  Neurological:  Alert, awake, and appropriate.  Normal speech.  No acute focal neurological deficits are appreciated.  Psychiatric: Normal affect. Good eye contact. Appropriate in content.     ED Course   Procedures  ED Vital Signs:  Vitals:    08/09/19 0225 08/09/19 0421   BP: (!) 168/71 (!) 159/73   Pulse: 64 67   Resp: 16 16   Temp: 97.8 °F (36.6 °C) 97.9 °F (36.6 °C)   TempSrc: Oral Oral   SpO2: 97% 97%   Weight: 64.7 kg (142 lb 10.2 oz)    Height: 5' 3" (1.6 m)               The Emergency Provider reviewed the vital signs and test results, which are outlined above.     ED Discussion     4:07 AM:  Discussed with pt all pertinent ED information and results. Discussed pt dx and plan of tx. Gave pt all f/u and return to the ED instructions. All questions and concerns were addressed at this time. Pt expresses understanding of information and instructions, and is comfortable with plan to discharge. Pt is stable for discharge.    I discussed with patient and/or family/caretaker that evaluation in the ED does not suggest any emergent or life threatening medical conditions requiring immediate intervention beyond what was provided in the ED, and I believe patient is safe for discharge.  Regardless, an unremarkable evaluation in the ED does not preclude the development or presence of a serious of life threatening condition. As such, patient was instructed to return immediately for any worsening or change in current symptoms.        ED Medication(s):  Medications   acetaminophen tablet 1,000 mg (1,000 mg Oral Given 8/9/19 0418)       Discharge Medication List as of 8/9/2019  4:05 AM          Follow-up Information     Jeffry Barrois MD. Go in 1 " day.    Specialty:  Family Medicine  Why:  For re-evaluation and further treatment  Contact information:  [address]  [place] [address]  [phone]                   ED Course as of Aug 09 0611   Fri Aug 09, 2019   0611 Stable, no signs of anaphylaxis.     [BA]      ED Course User Index  [BA] John Wise MD                         Scribe Attestation:   Scribe #1: I performed the above scribed service and the documentation accurately describes the services I performed. I attest to the accuracy of the note.     Attending:   Physician Attestation Statement for Scribe #1: I, John Wise MD, personally performed the services described in this documentation, as scribed by Chris Ruiz, in my presence, and it is both accurate and complete.           Clinical Impression       ICD-10-CM ICD-9-CM   1. Hymenoptera sting, accidental or unintentional, initial encounter T63.481A 989.5     E905.5   2. Left hand pain M79.642 729.5       Disposition:   Disposition: Discharged  Condition: Stable         John Wise MD  08/09/19 0611

## 2019-11-19 ENCOUNTER — OFFICE VISIT (OUTPATIENT)
Dept: PODIATRY | Facility: CLINIC | Age: 84
End: 2019-11-19
Payer: MEDICARE

## 2019-11-19 VITALS
SYSTOLIC BLOOD PRESSURE: 121 MMHG | HEIGHT: 63 IN | WEIGHT: 142.63 LBS | HEART RATE: 64 BPM | BODY MASS INDEX: 25.27 KG/M2 | DIASTOLIC BLOOD PRESSURE: 72 MMHG

## 2019-11-19 DIAGNOSIS — L85.3 XEROSIS CUTIS: ICD-10-CM

## 2019-11-19 DIAGNOSIS — B35.1 DERMATOPHYTOSIS OF NAIL: ICD-10-CM

## 2019-11-19 DIAGNOSIS — E11.49 TYPE II DIABETES MELLITUS WITH NEUROLOGICAL MANIFESTATIONS: Primary | ICD-10-CM

## 2019-11-19 PROCEDURE — 11720 DEBRIDE NAIL 1-5: CPT | Mod: 59,Q9,S$PBB, | Performed by: PODIATRIST

## 2019-11-19 PROCEDURE — 11720 PR DEBRIDEMENT OF NAIL(S), 1-5: ICD-10-PCS | Mod: 59,Q9,S$PBB, | Performed by: PODIATRIST

## 2019-11-19 PROCEDURE — 99999 PR PBB SHADOW E&M-EST. PATIENT-LVL III: CPT | Mod: PBBFAC,,, | Performed by: PODIATRIST

## 2019-11-19 PROCEDURE — 11719 TRIM NAIL(S) ANY NUMBER: CPT | Mod: Q9,59,PBBFAC | Performed by: PODIATRIST

## 2019-11-19 PROCEDURE — 99999 PR PBB SHADOW E&M-EST. PATIENT-LVL III: ICD-10-PCS | Mod: PBBFAC,,, | Performed by: PODIATRIST

## 2019-11-19 PROCEDURE — 99213 OFFICE O/P EST LOW 20 MIN: CPT | Mod: PBBFAC | Performed by: PODIATRIST

## 2019-11-19 PROCEDURE — 99213 PR OFFICE/OUTPT VISIT, EST, LEVL III, 20-29 MIN: ICD-10-PCS | Mod: 25,S$PBB,, | Performed by: PODIATRIST

## 2019-11-19 PROCEDURE — 99213 OFFICE O/P EST LOW 20 MIN: CPT | Mod: 25,S$PBB,, | Performed by: PODIATRIST

## 2019-11-19 PROCEDURE — 11720 DEBRIDE NAIL 1-5: CPT | Mod: Q9,PBBFAC | Performed by: PODIATRIST

## 2019-11-19 PROCEDURE — 11719 PR TRIM NAIL(S): ICD-10-PCS | Mod: Q9,S$PBB,, | Performed by: PODIATRIST

## 2019-11-19 PROCEDURE — 11719 TRIM NAIL(S) ANY NUMBER: CPT | Mod: Q9,S$PBB,, | Performed by: PODIATRIST

## 2019-11-19 NOTE — PROGRESS NOTES
Subjective:       Patient ID: Rosy Balderrama is a 88 y.o. female.    Chief Complaint: Routine Foot Care (pt rates pain 0/10,wears tennis w/socks,diabetic pt,PCp Dr. Barrios.)      HPI: Patient presents to the office with the chief complaint of elongated, thickened and dystrophic nail plates to the B/L foot. This patient is a Diabetic Type II, complicated with Peripheral Neuropathy. Patient does follow with Primary Care and/or Endocrinology for management of Diabetes Mellitus. This patient's PMD is Jeffry Barrios MD. This patient last saw his/her primary care provider on 11/11.  Patient presents this morning her daughter.  Patient is ambulatory with assistance of a rolling walker.  Patient does complain of substantial xerosis and flaking skin of the bilateral lower extremity.    No results found for: HGBA1C.    Review of patient's allergies indicates:   Allergen Reactions    Alendronate Other (See Comments)    Alendronate sodium     Atorvastatin Other (See Comments)    Ciprofloxacin Other (See Comments)    Codeine Itching    Diazepam Itching    Ibuprofen Other (See Comments)    Oxycodone-acetaminophen Other (See Comments) and Itching    Rofecoxib Other (See Comments)    Simvastatin Other (See Comments)       Past Medical History:   Diagnosis Date    *Atrial fibrillation     Allergy     Blood transfusion     Cataract     Diabetes mellitus type II     Hyperlipidemia     Osteoporosis        Family History   Problem Relation Age of Onset    Cancer Mother     Cancer Sister     Diabetes Brother        Social History     Socioeconomic History    Marital status:      Spouse name: Not on file    Number of children: Not on file    Years of education: Not on file    Highest education level: Not on file   Occupational History    Occupation: Retired/Housewife     Comment: Housewife   Social Needs    Financial resource strain: Not on file    Food insecurity:     Worry: Not on file      Inability: Not on file    Transportation needs:     Medical: Not on file     Non-medical: Not on file   Tobacco Use    Smoking status: Never Smoker    Smokeless tobacco: Never Used   Substance and Sexual Activity    Alcohol use: No    Drug use: No    Sexual activity: Not on file   Lifestyle    Physical activity:     Days per week: Not on file     Minutes per session: Not on file    Stress: Not on file   Relationships    Social connections:     Talks on phone: Not on file     Gets together: Not on file     Attends Taoism service: Not on file     Active member of club or organization: Not on file     Attends meetings of clubs or organizations: Not on file     Relationship status: Not on file   Other Topics Concern    Not on file   Social History Narrative    Patient is a housewife.       Past Surgical History:   Procedure Laterality Date    ANKLE SURGERY      broken ankle    BILATERAL SALPINGOOPHORECTOMY      BREAST BIOPSY      CARPAL TUNNEL RELEASE      CHOLECYSTECTOMY      COLONOSCOPY      ERCP      ESOPHAGOGASTRODUODENOSCOPY      FRACTURE SURGERY      KNEE CARTILAGE SURGERY      TOTAL ABDOMINAL HYSTERECTOMY      TOTAL HIP ARTHROPLASTY      TUBAL LIGATION         Review of Systems   Constitutional: Negative for chills, fatigue and fever.   HENT: Negative for hearing loss.    Eyes: Negative for photophobia and visual disturbance.   Respiratory: Negative for cough, chest tightness, shortness of breath and wheezing.    Cardiovascular: Negative for chest pain and palpitations.   Gastrointestinal: Negative for constipation, diarrhea, nausea and vomiting.   Endocrine: Negative for cold intolerance and heat intolerance.   Genitourinary: Negative for flank pain.   Musculoskeletal: Positive for arthralgias, back pain and gait problem. Negative for neck pain and neck stiffness.   Skin: Negative for wound.   Neurological: Positive for numbness. Negative for light-headedness and headaches.  "  Psychiatric/Behavioral: Negative for sleep disturbance.          Objective:   /72 (BP Location: Left arm, Patient Position: Sitting, BP Method: Medium (Automatic))   Pulse 64   Ht 5' 3" (1.6 m)   Wt 64.7 kg (142 lb 10.2 oz)   BMI 25.27 kg/m²     Physical Exam    LOWER EXTREMITY PHYSICAL EXAMINATION    NEUROLOGY: Protective sensation is not intact to the left and right plantar surfaces of the foot and digits, as the patient has no sensation/detection at greater than 4 distinct points of contact with 5.07 [place] Liliana monofilament. Sensation to light touch is intact on the left and right foot. Proprioception is intact, bilateral. Sensation to pin prick is reduced to absent. Vibratory sensation is diminished    DERMATOLOGY: On the left foot, nails 1 and 2 are suggestive of onychomycotic changes. On the right foot, nails 1 and 2 are suggestive of onychomycotic changes. These nail plates are thickened, are dystrophic, chaulky in appearance and malodorous with substantial subungual debris. These nail plates are yellow to brown in appearance. The remaining nail plates are elongated and do not have suggestive clinical features of onychomycosis.     ORTHOPEDIC: Manual Muscle Testing is 5/5 in all planes on the left and right, without pains, with and without resistance. Gait pattern is non-antalgic.    Assessment:     1. Type II diabetes mellitus with neurological manifestations    2. Xerosis cutis    3. Dermatophytosis of nail        Plan:     Type II diabetes mellitus with neurological manifestations  I counseled the patient on his/her Diabetic Mellitus regarding today's clinical examination and objection findings. We did also discuss recent medication changes, pertinent labs and imaging evaluations and other medical consultation notes and progress notes. Greater than 50% of this visit was spent on counseling and coordination of care. Greater than 20 minutes of this appt. was spent on education about the " diabetic foot, in relation to PVD and/or neuropathy, and the prevention of limb loss.     Shoe gear is inspected and wear and proper fit/type. Patient is reminded of the importance of good nutrition and blood sugar control to help prevent podiatric complications of diabetes. Patient instructed on proper foot hygeine. We discussed wearing proper shoe gear, daily foot inspections, never walking without protective shoe gear, never putting sharp instruments to feet.  Patient  will continue to monitor the areas daily, inspect feet, wear protective shoe gear when ambulatory, moisturizer to maintain skin integrity.     Patient's DMI/DMII is managed by Primary Care Provider and/or Endocrinology Advanced Practice Provider. As per the most recent glycohemoglobin level, this patient is at goal.    Xerosis cutis  Recommend twice to 3 times daily topical emollient. Did discuss with the patient concerning dry and thin skin in relation to complications due to comorbid states. Patient will purchase OTC Urea 40% and use BID or TID.    Dermatophytosis of nail  The onychomycotic nail plates, as outlined above, are sharply debrided with double action nail nipper, and/or with the assistance of a mechanical rotary racquel, with removal of all offending nail and nail border(s), for reduction of pains. Nails are reduced in terms of length, width and girth with removal of subungual debris to facilitate pain free weight bearing and ambulation. The elongated nails as outlined in the objective portion of this note, were trimmed to appropriate length, with a double action nail nipper, for alleviation/reduction of pains as well. Follow up in approx. 3-4 months.      Protective Sensation (w/ 10 gram monofilament):  Right: Absent  Left: Absent    Visual Inspection:  Dry Skin -  Bilateral and Onychomycosis -  Bilateral    Pedal Pulses:   Right: Diminshed  Left: Diminshed    Posterior tibialis:   Right:Diminshed  Left: Diminshed              Future  Appointments   Date Time Provider Department Center   3/16/2020  9:15 AM Daron Leonardo DPM [facility]

## 2020-06-09 ENCOUNTER — OFFICE VISIT (OUTPATIENT)
Dept: PODIATRY | Facility: CLINIC | Age: 85
End: 2020-06-09
Payer: MEDICARE

## 2020-06-09 VITALS
SYSTOLIC BLOOD PRESSURE: 127 MMHG | BODY MASS INDEX: 25.27 KG/M2 | WEIGHT: 142.63 LBS | HEIGHT: 63 IN | HEART RATE: 56 BPM | DIASTOLIC BLOOD PRESSURE: 69 MMHG

## 2020-06-09 DIAGNOSIS — B35.1 DERMATOPHYTOSIS OF NAIL: ICD-10-CM

## 2020-06-09 DIAGNOSIS — M20.41 HAMMER TOES OF BOTH FEET: ICD-10-CM

## 2020-06-09 DIAGNOSIS — M21.751 ACQUIRED UNEQUAL LIMB LENGTH OF FEMUR, RIGHT: ICD-10-CM

## 2020-06-09 DIAGNOSIS — M21.70 LOWER LIMB LENGTH DIFFERENCE: ICD-10-CM

## 2020-06-09 DIAGNOSIS — E11.49 TYPE II DIABETES MELLITUS WITH NEUROLOGICAL MANIFESTATIONS: Primary | ICD-10-CM

## 2020-06-09 DIAGNOSIS — M20.42 HAMMER TOES OF BOTH FEET: ICD-10-CM

## 2020-06-09 DIAGNOSIS — M20.11 HALLUX VALGUS (ACQUIRED), RIGHT FOOT: ICD-10-CM

## 2020-06-09 DIAGNOSIS — M20.12 HALLUX VALGUS (ACQUIRED), LEFT FOOT: ICD-10-CM

## 2020-06-09 PROCEDURE — 99214 PR OFFICE/OUTPT VISIT, EST, LEVL IV, 30-39 MIN: ICD-10-PCS | Mod: 25,S$PBB,, | Performed by: PODIATRIST

## 2020-06-09 PROCEDURE — 99999 PR PBB SHADOW E&M-EST. PATIENT-LVL III: CPT | Mod: PBBFAC,,, | Performed by: PODIATRIST

## 2020-06-09 PROCEDURE — 11720 DEBRIDE NAIL 1-5: CPT | Mod: Q9,59,PBBFAC | Performed by: PODIATRIST

## 2020-06-09 PROCEDURE — 11719 TRIM NAIL(S) ANY NUMBER: CPT | Mod: Q9,PBBFAC | Performed by: PODIATRIST

## 2020-06-09 PROCEDURE — 99999 PR PBB SHADOW E&M-EST. PATIENT-LVL III: ICD-10-PCS | Mod: PBBFAC,,, | Performed by: PODIATRIST

## 2020-06-09 PROCEDURE — 11719 TRIM NAIL(S) ANY NUMBER: CPT | Mod: Q9,S$PBB,, | Performed by: PODIATRIST

## 2020-06-09 PROCEDURE — 99214 OFFICE O/P EST MOD 30 MIN: CPT | Mod: 25,S$PBB,, | Performed by: PODIATRIST

## 2020-06-09 PROCEDURE — 11720 PR DEBRIDEMENT OF NAIL(S), 1-5: ICD-10-PCS | Mod: Q9,59,S$PBB, | Performed by: PODIATRIST

## 2020-06-09 PROCEDURE — 99213 OFFICE O/P EST LOW 20 MIN: CPT | Mod: PBBFAC,25 | Performed by: PODIATRIST

## 2020-06-09 PROCEDURE — 11719 PR TRIM NAIL(S): ICD-10-PCS | Mod: Q9,S$PBB,, | Performed by: PODIATRIST

## 2020-06-09 PROCEDURE — 11720 DEBRIDE NAIL 1-5: CPT | Mod: Q9,59,S$PBB, | Performed by: PODIATRIST

## 2020-06-09 NOTE — PROGRESS NOTES
Subjective:       Patient ID: Rosy Balderrama is a 88 y.o. female.    Chief Complaint: Routine Foot Care (pt also need orthropedic shoes rates pain 0/10 tennis w/socks diabetic pt pcp Dr. Barrios.)      HPI: Rosy Balderrama presents to the office today, under referral by their Primary Care Provider, Jeffry Barrios MD, for her annual diabetic foot assessment and risk evalution Patient is a DMII. Patient states neuropathy. This patient last saw his/her primary care provider, approximately 3 months ago prior to COVID-19 restrictions.  Patient presents this afternoon with her son.  Patient requests a prescription for orthopedic shoe gear. Patient last saw her PCP as of 2/10/2020.    No results found for: HGBA1C.    Review of patient's allergies indicates:   Allergen Reactions    Alendronate Other (See Comments)    Alendronate sodium     Atorvastatin Other (See Comments)    Ciprofloxacin Other (See Comments)    Codeine Itching    Diazepam Itching    Ibuprofen Other (See Comments)    Oxycodone-acetaminophen Other (See Comments) and Itching    Rofecoxib Other (See Comments)    Simvastatin Other (See Comments)       Past Medical History:   Diagnosis Date    *Atrial fibrillation     Allergy     Blood transfusion     Cataract     Diabetes mellitus type II     Hyperlipidemia     Osteoporosis        Family History   Problem Relation Age of Onset    Cancer Mother     Cancer Sister     Diabetes Brother        Social History     Socioeconomic History    Marital status:      Spouse name: Not on file    Number of children: Not on file    Years of education: Not on file    Highest education level: Not on file   Occupational History    Occupation: Retired/Housewife     Comment: Housewife   Social Needs    Financial resource strain: Not on file    Food insecurity:     Worry: Not on file     Inability: Not on file    Transportation needs:     Medical: Not on file     Non-medical: Not on file  "  Tobacco Use    Smoking status: Never Smoker    Smokeless tobacco: Never Used   Substance and Sexual Activity    Alcohol use: No    Drug use: No    Sexual activity: Not on file   Lifestyle    Physical activity:     Days per week: Not on file     Minutes per session: Not on file    Stress: Not on file   Relationships    Social connections:     Talks on phone: Not on file     Gets together: Not on file     Attends [employer] service: Not on file     Active member of club or organization: Not on file     Attends meetings of clubs or organizations: Not on file     Relationship status: Not on file   Other Topics Concern    Not on file   Social History Narrative    Patient is a housewife.       Past Surgical History:   Procedure Laterality Date    ANKLE SURGERY      broken ankle    BILATERAL SALPINGOOPHORECTOMY      BREAST BIOPSY      CARPAL TUNNEL RELEASE      CHOLECYSTECTOMY      COLONOSCOPY      ERCP      ESOPHAGOGASTRODUODENOSCOPY      FRACTURE SURGERY      KNEE CARTILAGE SURGERY      TOTAL ABDOMINAL HYSTERECTOMY      TOTAL HIP ARTHROPLASTY      TUBAL LIGATION         Review of Systems   Constitutional: Negative for chills, fatigue and fever.   HENT: Negative for hearing loss.    Eyes: Negative for photophobia and visual disturbance.   Respiratory: Negative for cough, chest tightness, shortness of breath and wheezing.    Cardiovascular: Negative for chest pain and palpitations.   Gastrointestinal: Negative for constipation, diarrhea, nausea and vomiting.   Endocrine: Negative for cold intolerance and heat intolerance.   Genitourinary: Negative for flank pain.   Musculoskeletal: Negative for neck pain and neck stiffness.   Skin: Negative for wound.   Neurological: Positive for numbness. Negative for light-headedness and headaches.   Psychiatric/Behavioral: Negative for sleep disturbance.         Objective:   /69   Pulse (!) 56   Ht 5' 3" (1.6 m)   Wt 64.7 kg (142 lb 10.2 oz)   BMI 25.27 " kg/m²     Physical Exam  LOWER EXTREMITY PHYSICAL EXAMINATION    ORTHOPEDIC: Manual Muscle Testing is 5/5 in all planes on the left and right, without pains, with and without resistance.  Limb length discrepancy is noted, right shorter than left.  Rectus foot type is otherwise noted.  Gait pattern is slow, but progressive.  Semi rigid hammertoe contractures are noted to the bilateral lower extremity.    VASCULAR: Warm to warm, proximal to distal. Capillary refill time is within normal limits and less  than 3 seconds. Hair growth is sparse on the left and right dorsal foot and at the digits. The left dorsalis pedis pulse is 1/4 and on the right is 1/4, and the left posterior tibial pulse is 1/4 and is 1/4 on the right.     NEUROLOGY: Sensation to light touch is intact. Proprioception is intact, bilateral. Sensation to pin prick is reduced to absent. Vibratory sensation is diminished to the left and right lower extremity. Examination with 5.07 [place] monofilament reveals that protective sensation is not intact to the left and right plantar surfaces of the foot and digits, as the patient has no sensation/detection at greater than 4 distinct points of contact.     DERMATOLOGY: Skin is supple, moist, dry, intact and xerotic. There are nail changes which are consistent with onychomycosis on the left and right foot. On the left and the right foot, the 1st and 2nd toe nails are thickened, are dystrophic, with yellow discoloration, and with malodorous subungual debris. These thickened and dystrophic nails are painful to touch and palpation. The remaining nail plates are elongated, and are not suggestive of onychomycosis.  No calluses noted.    Assessment:     1. Type II diabetes mellitus with neurological manifestations    2. Dermatophytosis of nail    3. Lower limb length difference    4. Acquired unequal limb length of femur, right    5. Hallux valgus (acquired), left foot    6. Hallux valgus (acquired), right  foot    7. Hammer toes of both feet        Plan:     Type II diabetes mellitus with neurological manifestations  I counseled the patient on his/her Diabetic Mellitus regarding today's clinical examination and objection findings. We did also discuss recent medication changes, pertinent labs and imaging evaluations and other medical consultation notes and progress notes. Greater than 50% of this visit was spent on counseling and coordination of care. Greater than 20 minutes of this appt. was spent on education about the diabetic foot, in relation to PVD and/or neuropathy, and the prevention of limb loss.     Shoe gear is inspected and wear and proper fit/type. Patient is reminded of the importance of good nutrition and blood sugar control to help prevent podiatric complications of diabetes. Patient instructed on proper foot hygeine. We discussed wearing proper shoe gear, daily foot inspections, never walking without protective shoe gear, never putting sharp instruments to feet.  Patient  will continue to monitor the areas daily, inspect feet, wear protective shoe gear when ambulatory, moisturizer to maintain skin integrity.     Patient's DMI/DMII is managed by Primary Care Provider and/or Endocrinology Advanced Practice Provider. As per the most recent glycohemoglobin level, this patient is at goal.    Dermatophytosis of nail  The onychomycotic nail plates, as outlined above, are sharply debrided with double action nail nipper, and/or with the assistance of a mechanical rotary racquel, with removal of all offending nail and nail border(s), for reduction of pains. Nails are reduced in terms of length, width and girth with removal of subungual debris to facilitate pain free weight bearing and ambulation. The elongated nails as outlined in the objective portion of this note, were trimmed to appropriate length, with a double action nail nipper, for alleviation/reduction of pains as well. Follow up in approx. 3-4  months.    Lower limb length difference  Acquired unequal limb length of femur, right  Hallux valgus (acquired), left foot  Hallux valgus (acquired), right foot  Hammer toes of both feet  Did discuss proper and supportive shoe gear in detail and at length with the patient.  These are shoes with firm and robust arch support; medial counter.  Shoes which only bend at the metatarsophalangeal joint and which are rigid in the midfoot and hindfoot. Patient urged to purchase running type or cross training type shoes gear which are designed for pronation control.    Rec. shoe gear with soft and accommodative foot bed and with a high toe box for alleviation of symptoms. Possible EE or EEE in width as well for alleviation of symptoms.          Protective Sensation (w/ 10 gram monofilament):  Right: Absent  Left: Absent    Visual Inspection:  Normal -  Bilateral and Onychomycosis -  Bilateral    Pedal Pulses:   Right: Diminshed  Left: Diminshed    Posterior tibialis:   Right:Diminshed  Left: Diminshed              Future Appointments   Date Time Provider Department Center   10/12/2020 10:00 AM Daron Leonardo DPM [facility]

## 2020-08-28 ENCOUNTER — TELEPHONE (OUTPATIENT)
Dept: PODIATRY | Facility: CLINIC | Age: 85
End: 2020-08-28

## 2020-08-28 NOTE — TELEPHONE ENCOUNTER
Spoke with Yani pts daughter and would like a diabetic shoe order placed to [facility]. Order from 6/2/2020 faxed to [facility]. Call ended pleasantly.     ----- Message from James Carter sent at 8/28/2020  2:45 PM CDT -----  Contact: daughter-yani  Requesting call back regarding getting a referral sent to [facility]. Please call back at [phone].

## 2020-10-19 ENCOUNTER — OFFICE VISIT (OUTPATIENT)
Dept: PODIATRY | Facility: CLINIC | Age: 85
End: 2020-10-19
Payer: MEDICARE

## 2020-10-19 VITALS
HEART RATE: 59 BPM | HEIGHT: 63 IN | DIASTOLIC BLOOD PRESSURE: 54 MMHG | WEIGHT: 146.38 LBS | SYSTOLIC BLOOD PRESSURE: 114 MMHG | BODY MASS INDEX: 25.94 KG/M2

## 2020-10-19 DIAGNOSIS — E11.49 TYPE II DIABETES MELLITUS WITH NEUROLOGICAL MANIFESTATIONS: Primary | ICD-10-CM

## 2020-10-19 DIAGNOSIS — L85.3 XEROSIS CUTIS: ICD-10-CM

## 2020-10-19 DIAGNOSIS — B35.1 DERMATOPHYTOSIS OF NAIL: ICD-10-CM

## 2020-10-19 PROCEDURE — 99215 OFFICE O/P EST HI 40 MIN: CPT | Mod: PBBFAC,25 | Performed by: PODIATRIST

## 2020-10-19 PROCEDURE — 99213 PR OFFICE/OUTPT VISIT, EST, LEVL III, 20-29 MIN: ICD-10-PCS | Mod: 25,S$PBB,, | Performed by: PODIATRIST

## 2020-10-19 PROCEDURE — 11719 TRIM NAIL(S) ANY NUMBER: CPT | Mod: Q9,PBBFAC | Performed by: PODIATRIST

## 2020-10-19 PROCEDURE — 99213 OFFICE O/P EST LOW 20 MIN: CPT | Mod: 25,S$PBB,, | Performed by: PODIATRIST

## 2020-10-19 PROCEDURE — 11720 DEBRIDE NAIL 1-5: CPT | Mod: Q9,59,S$PBB, | Performed by: PODIATRIST

## 2020-10-19 PROCEDURE — 99999 PR PBB SHADOW E&M-EST. PATIENT-LVL V: ICD-10-PCS | Mod: PBBFAC,,, | Performed by: PODIATRIST

## 2020-10-19 PROCEDURE — 11719 TRIM NAIL(S) ANY NUMBER: CPT | Mod: Q9,S$PBB,, | Performed by: PODIATRIST

## 2020-10-19 PROCEDURE — 99999 PR PBB SHADOW E&M-EST. PATIENT-LVL V: CPT | Mod: PBBFAC,,, | Performed by: PODIATRIST

## 2020-10-19 PROCEDURE — 11720 DEBRIDE NAIL 1-5: CPT | Mod: Q9,59,PBBFAC | Performed by: PODIATRIST

## 2020-10-19 PROCEDURE — 11720 PR DEBRIDEMENT OF NAIL(S), 1-5: ICD-10-PCS | Mod: Q9,59,S$PBB, | Performed by: PODIATRIST

## 2020-10-19 PROCEDURE — 11719 PR TRIM NAIL(S): ICD-10-PCS | Mod: Q9,S$PBB,, | Performed by: PODIATRIST

## 2020-10-19 RX ORDER — RALOXIFENE HYDROCHLORIDE 60 MG/1
60 TABLET, FILM COATED ORAL DAILY
COMMUNITY

## 2020-10-19 NOTE — PROGRESS NOTES
Subjective:       Patient ID: Rosy Balderrama is a 89 y.o. female.    Chief Complaint: Routine Foot Care (0/10 tennis w/socks diabetic pt pcp .)      HPI: Rosy Balderrama presents to the office today for diabetic foot assessment and evaluation.  Patient presents with her daughter.  Patient denies new medical issues since her last evaluation.  Does states substantial xerosis to the bilateral foot.  Denies tinea pedis.  Patient ambulatory the assistance of a walker.  Patient last saw her primary care provider on or around 08/13/2020.  No results found for: HGBA1C.    Review of patient's allergies indicates:   Allergen Reactions    Alendronate Other (See Comments)    Alendronate sodium     Atorvastatin Other (See Comments)    Ciprofloxacin Other (See Comments)    Codeine Itching    Diazepam Itching    Ibuprofen Other (See Comments)    Oxycodone-acetaminophen Other (See Comments) and Itching    Rofecoxib Other (See Comments)    Simvastatin Other (See Comments)       Past Medical History:   Diagnosis Date    *Atrial fibrillation     Allergy     Blood transfusion     Cataract     Diabetes mellitus type II     Hyperlipidemia     Osteoporosis        Family History   Problem Relation Age of Onset    Cancer Mother     Cancer Sister     Diabetes Brother        Social History     Socioeconomic History    Marital status:      Spouse name: Not on file    Number of children: Not on file    Years of education: Not on file    Highest education level: Not on file   Occupational History    Occupation: Retired/Housewife     Comment: Housewife   Social Needs    Financial resource strain: Not on file    Food insecurity     Worry: Not on file     Inability: Not on file    Transportation needs     Medical: Not on file     Non-medical: Not on file   Tobacco Use    Smoking status: Never Smoker    Smokeless tobacco: Never Used   Substance and Sexual Activity    Alcohol use: No    Drug use: No     "Sexual activity: Not on file   Lifestyle    Physical activity     Days per week: Not on file     Minutes per session: Not on file    Stress: Not on file   Relationships    Social connections     Talks on phone: Not on file     Gets together: Not on file     Attends Sabianism service: Not on file     Active member of club or organization: Not on file     Attends meetings of clubs or organizations: Not on file     Relationship status: Not on file   Other Topics Concern    Not on file   Social History Narrative    Patient is a housewife.       Past Surgical History:   Procedure Laterality Date    ANKLE SURGERY      broken ankle    BILATERAL SALPINGOOPHORECTOMY      BREAST BIOPSY      CARPAL TUNNEL RELEASE      CHOLECYSTECTOMY      COLONOSCOPY      ERCP      ESOPHAGOGASTRODUODENOSCOPY      FRACTURE SURGERY      KNEE CARTILAGE SURGERY      TOTAL ABDOMINAL HYSTERECTOMY      TOTAL HIP ARTHROPLASTY      TUBAL LIGATION         Review of Systems   Constitutional: Negative for chills, fatigue and fever.   HENT: Negative for hearing loss.    Eyes: Negative for photophobia and visual disturbance.   Respiratory: Negative for cough, chest tightness, shortness of breath and wheezing.    Cardiovascular: Negative for chest pain and palpitations.   Gastrointestinal: Negative for constipation, diarrhea, nausea and vomiting.   Endocrine: Negative for cold intolerance and heat intolerance.   Genitourinary: Negative for flank pain.   Musculoskeletal: Positive for arthralgias. Negative for neck pain and neck stiffness.   Skin: Negative for wound.   Neurological: Positive for numbness. Negative for light-headedness and headaches.   Psychiatric/Behavioral: Negative for sleep disturbance.         Objective:   BP (!) 114/54   Pulse (!) 59   Ht 5' 3" (1.6 m)   Wt 66.4 kg (146 lb 6.2 oz)   BMI 25.93 kg/m²     Physical Exam  LOWER EXTREMITY PHYSICAL EXAMINATION    VASCULAR: Warm to warm, proximal to distal. Capillary refill " time is within normal limits and less  than 3 seconds. Hair growth is sparse on the left and right dorsal foot and at the digits. The left dorsalis pedis pulse is 1/4 and on the right is 1/4, and the left posterior tibial pulse is 1/4 and is 1/4 on the right.     NEUROLOGY: Protective sensation is not intact to the left and right plantar surfaces of the foot and digits, as the patient has no sensation/detection at greater than 4 distinct points of contact with 5.07 [place] Liliana monofilament. Sensation to light touch is intact on the left and right foot. Proprioception is intact, bilateral. Sensation to pin prick is reduced to absent. Vibratory sensation is diminished.     DERMATOLOGY: Skin is supple, moist, dry, intact and xerotic. There are nail changes which are consistent with onychomycosis on the left and right foot. On the left and the right foot, the 1st and 2nd toe nails are thickened, are dystrophic, with yellow discoloration, and with malodorous subungual debris. These thickened and dystrophic nails are painful to touch and palpation. The remaining nail plates are elongated, and are not suggestive of onychomycosis.  No calluses noted.  Substantial xerosis is noted to the bilateral plantar foot.  No evidence of tinea pedis.    ORTHOPEDIC: Manual Muscle Testing is 5/5 in all planes on the left and right, without pains, with and without resistance.  No discomfort to palpation any surface or border of the left or right foot or ankle.  Patient ambulatory the assistance of a rolling walker.    Assessment:     1. Type II diabetes mellitus with neurological manifestations    2. Dermatophytosis of nail    3. Xerosis cutis        Plan:     Type II diabetes mellitus with neurological manifestations  I counseled the patient on his/her Diabetic Mellitus regarding today's clinical examination and objection findings. We did also discuss recent medication changes, pertinent labs and imaging evaluations and other  medical consultation notes and progress notes. Greater than 50% of this visit was spent on counseling and coordination of care. Greater than 20 minutes of this appt. was spent on education about the diabetic foot, in relation to PVD and/or neuropathy, and the prevention of limb loss.     Shoe gear is inspected and wear and proper fit/type. Patient is reminded of the importance of good nutrition and blood sugar control to help prevent podiatric complications of diabetes. Patient instructed on proper foot hygeine. We discussed wearing proper shoe gear, daily foot inspections, never walking without protective shoe gear, never putting sharp instruments to feet.  Patient  will continue to monitor the areas daily, inspect feet, wear protective shoe gear when ambulatory, moisturizer to maintain skin integrity.     Patient's DMI/DMII is managed by Primary Care Provider and/or Endocrinology Advanced Practice Provider. As per the most recent glycohemoglobin level, this patient is at goal.    Dermatophytosis of nail  The onychomycotic nail plates, as outlined above, are sharply debrided with double action nail nipper, and/or with the assistance of a mechanical rotary racquel, with removal of all offending nail and nail border(s), for reduction of pains. Nails are reduced in terms of length, width and girth with removal of subungual debris to facilitate pain free weight bearing and ambulation. The elongated nails as outlined in the objective portion of this note, were trimmed to appropriate length, with a double action nail nipper, for alleviation/reduction of pains as well. Follow up in approx. 3-4 months.    Xerosis cutis  Recommend twice to 3 times daily topical emollient. Did discuss with the patient concerning dry and thin skin in relation to complications due to comorbid states. Patient will purchase OTC Urea 40% and use BID or TID or OTC Eucerin Lotion/Cream and use it BID or TID.              Future Appointments   Date  Time Provider Department [place]   2/22/2021 10:00 AM Daron Leonardo DPM [facility]

## 2021-02-26 ENCOUNTER — OFFICE VISIT (OUTPATIENT)
Dept: PODIATRY | Facility: CLINIC | Age: 86
End: 2021-02-26
Payer: MEDICARE

## 2021-02-26 VITALS
HEIGHT: 63 IN | WEIGHT: 146 LBS | SYSTOLIC BLOOD PRESSURE: 127 MMHG | BODY MASS INDEX: 25.87 KG/M2 | DIASTOLIC BLOOD PRESSURE: 66 MMHG

## 2021-02-26 DIAGNOSIS — E11.49 TYPE II DIABETES MELLITUS WITH NEUROLOGICAL MANIFESTATIONS: Primary | ICD-10-CM

## 2021-02-26 DIAGNOSIS — L85.3 XEROSIS CUTIS: ICD-10-CM

## 2021-02-26 DIAGNOSIS — B35.1 DERMATOPHYTOSIS OF NAIL: ICD-10-CM

## 2021-02-26 PROCEDURE — 99999 PR PBB SHADOW E&M-EST. PATIENT-LVL V: CPT | Mod: PBBFAC,,, | Performed by: PODIATRIST

## 2021-02-26 PROCEDURE — 99215 OFFICE O/P EST HI 40 MIN: CPT | Mod: PBBFAC,25 | Performed by: PODIATRIST

## 2021-02-26 PROCEDURE — 11719 TRIM NAIL(S) ANY NUMBER: CPT | Mod: Q9,59,PBBFAC | Performed by: PODIATRIST

## 2021-02-26 PROCEDURE — 11720 DEBRIDE NAIL 1-5: CPT | Mod: Q9,59,S$PBB, | Performed by: PODIATRIST

## 2021-02-26 PROCEDURE — 99213 PR OFFICE/OUTPT VISIT, EST, LEVL III, 20-29 MIN: ICD-10-PCS | Mod: 25,S$PBB,, | Performed by: PODIATRIST

## 2021-02-26 PROCEDURE — 11720 PR DEBRIDEMENT OF NAIL(S), 1-5: ICD-10-PCS | Mod: Q9,59,S$PBB, | Performed by: PODIATRIST

## 2021-02-26 PROCEDURE — 11719 TRIM NAIL(S) ANY NUMBER: CPT | Mod: Q9,S$PBB,, | Performed by: PODIATRIST

## 2021-02-26 PROCEDURE — 11719 PR TRIM NAIL(S): ICD-10-PCS | Mod: Q9,S$PBB,, | Performed by: PODIATRIST

## 2021-02-26 PROCEDURE — 11720 DEBRIDE NAIL 1-5: CPT | Mod: Q9,PBBFAC | Performed by: PODIATRIST

## 2021-02-26 PROCEDURE — 99213 OFFICE O/P EST LOW 20 MIN: CPT | Mod: 25,S$PBB,, | Performed by: PODIATRIST

## 2021-02-26 PROCEDURE — 99999 PR PBB SHADOW E&M-EST. PATIENT-LVL V: ICD-10-PCS | Mod: PBBFAC,,, | Performed by: PODIATRIST

## 2021-06-19 ENCOUNTER — HOSPITAL ENCOUNTER (EMERGENCY)
Facility: HOSPITAL | Age: 86
Discharge: HOME | End: 2021-06-19
Attending: FAMILY MEDICINE
Payer: MEDICARE

## 2021-06-19 VITALS
TEMPERATURE: 99 F | BODY MASS INDEX: 25.86 KG/M2 | HEART RATE: 65 BPM | DIASTOLIC BLOOD PRESSURE: 68 MMHG | OXYGEN SATURATION: 99 % | RESPIRATION RATE: 18 BRPM | HEIGHT: 63 IN | SYSTOLIC BLOOD PRESSURE: 140 MMHG

## 2021-06-19 DIAGNOSIS — S51.812A LACERATION OF LEFT FOREARM, INITIAL ENCOUNTER: Primary | ICD-10-CM

## 2021-06-19 PROCEDURE — 99282 EMERGENCY DEPT VISIT SF MDM: CPT | Mod: 25

## 2021-06-19 PROCEDURE — 12002 RPR S/N/AX/GEN/TRNK2.6-7.5CM: CPT

## 2021-06-19 PROCEDURE — 25000003 PHARM REV CODE 250: Performed by: FAMILY MEDICINE

## 2021-06-19 RX ORDER — MULTIVITAMIN
1 TABLET ORAL
COMMUNITY
End: 2022-07-19

## 2021-06-19 RX ORDER — AMOXICILLIN AND CLAVULANATE POTASSIUM 875; 125 MG/1; MG/1
TABLET, FILM COATED ORAL
COMMUNITY
Start: 2021-06-14 | End: 2022-07-19

## 2021-06-19 RX ORDER — LIDOCAINE HYDROCHLORIDE 10 MG/ML
10 INJECTION, SOLUTION EPIDURAL; INFILTRATION; INTRACAUDAL; PERINEURAL
Status: COMPLETED | OUTPATIENT
Start: 2021-06-19 | End: 2021-06-19

## 2021-06-19 RX ORDER — AMOXICILLIN AND CLAVULANATE POTASSIUM 500; 125 MG/1; MG/1
TABLET, FILM COATED ORAL
COMMUNITY
Start: 2021-03-02 | End: 2022-07-19

## 2021-06-19 RX ADMIN — LIDOCAINE HYDROCHLORIDE 100 MG: 10 INJECTION, SOLUTION EPIDURAL; INFILTRATION; INTRACAUDAL at 08:06

## 2021-06-28 ENCOUNTER — OFFICE VISIT (OUTPATIENT)
Dept: PODIATRY | Facility: CLINIC | Age: 86
End: 2021-06-28
Payer: MEDICARE

## 2021-06-28 VITALS — HEIGHT: 63 IN | WEIGHT: 145.94 LBS | BODY MASS INDEX: 25.86 KG/M2

## 2021-06-28 DIAGNOSIS — E11.49 TYPE II DIABETES MELLITUS WITH NEUROLOGICAL MANIFESTATIONS: Primary | ICD-10-CM

## 2021-06-28 DIAGNOSIS — M20.41 HAMMER TOES OF BOTH FEET: ICD-10-CM

## 2021-06-28 DIAGNOSIS — B35.1 DERMATOPHYTOSIS OF NAIL: ICD-10-CM

## 2021-06-28 DIAGNOSIS — M20.42 HAMMER TOES OF BOTH FEET: ICD-10-CM

## 2021-06-28 DIAGNOSIS — L85.3 XEROSIS CUTIS: ICD-10-CM

## 2021-06-28 DIAGNOSIS — M20.11 HALLUX VALGUS (ACQUIRED), RIGHT FOOT: ICD-10-CM

## 2021-06-28 DIAGNOSIS — M20.12 HALLUX VALGUS (ACQUIRED), LEFT FOOT: ICD-10-CM

## 2021-06-28 PROCEDURE — 99999 PR PBB SHADOW E&M-EST. PATIENT-LVL IV: CPT | Mod: PBBFAC,,, | Performed by: PODIATRIST

## 2021-06-28 PROCEDURE — 11719 TRIM NAIL(S) ANY NUMBER: CPT | Mod: Q9,S$PBB,, | Performed by: PODIATRIST

## 2021-06-28 PROCEDURE — 99214 OFFICE O/P EST MOD 30 MIN: CPT | Mod: PBBFAC,25 | Performed by: PODIATRIST

## 2021-06-28 PROCEDURE — 11720 DEBRIDE NAIL 1-5: CPT | Mod: Q9,59,S$PBB, | Performed by: PODIATRIST

## 2021-06-28 PROCEDURE — 99214 OFFICE O/P EST MOD 30 MIN: CPT | Mod: 25,S$PBB,, | Performed by: PODIATRIST

## 2021-06-28 PROCEDURE — 11720 PR DEBRIDEMENT OF NAIL(S), 1-5: ICD-10-PCS | Mod: Q9,59,S$PBB, | Performed by: PODIATRIST

## 2021-06-28 PROCEDURE — 11719 TRIM NAIL(S) ANY NUMBER: CPT | Mod: Q9,PBBFAC | Performed by: PODIATRIST

## 2021-06-28 PROCEDURE — 99214 PR OFFICE/OUTPT VISIT, EST, LEVL IV, 30-39 MIN: ICD-10-PCS | Mod: 25,S$PBB,, | Performed by: PODIATRIST

## 2021-06-28 PROCEDURE — 11720 DEBRIDE NAIL 1-5: CPT | Mod: Q9,59,PBBFAC | Performed by: PODIATRIST

## 2021-06-28 PROCEDURE — 99999 PR PBB SHADOW E&M-EST. PATIENT-LVL IV: ICD-10-PCS | Mod: PBBFAC,,, | Performed by: PODIATRIST

## 2021-06-28 PROCEDURE — 11719 PR TRIM NAIL(S): ICD-10-PCS | Mod: Q9,S$PBB,, | Performed by: PODIATRIST

## 2021-10-25 ENCOUNTER — TELEPHONE (OUTPATIENT)
Dept: PODIATRY | Facility: CLINIC | Age: 86
End: 2021-10-25
Payer: MEDICARE

## 2021-10-28 ENCOUNTER — TELEPHONE (OUTPATIENT)
Dept: PODIATRY | Facility: CLINIC | Age: 86
End: 2021-10-28
Payer: MEDICARE

## 2021-11-26 ENCOUNTER — OFFICE VISIT (OUTPATIENT)
Dept: PODIATRY | Facility: CLINIC | Age: 86
End: 2021-11-26
Payer: MEDICARE

## 2021-11-26 VITALS — WEIGHT: 145 LBS | HEIGHT: 63 IN | BODY MASS INDEX: 25.69 KG/M2

## 2021-11-26 DIAGNOSIS — B35.1 DERMATOPHYTOSIS OF NAIL: ICD-10-CM

## 2021-11-26 DIAGNOSIS — E11.49 TYPE II DIABETES MELLITUS WITH NEUROLOGICAL MANIFESTATIONS: Primary | ICD-10-CM

## 2021-11-26 PROCEDURE — 11719 TRIM NAIL(S) ANY NUMBER: CPT | Mod: 59,Q9,PBBFAC | Performed by: PODIATRIST

## 2021-11-26 PROCEDURE — 99499 UNLISTED E&M SERVICE: CPT | Mod: S$PBB,,, | Performed by: PODIATRIST

## 2021-11-26 PROCEDURE — 11720 PR DEBRIDEMENT OF NAIL(S), 1-5: ICD-10-PCS | Mod: 59,Q9,S$PBB, | Performed by: PODIATRIST

## 2021-11-26 PROCEDURE — 99214 OFFICE O/P EST MOD 30 MIN: CPT | Mod: PBBFAC,25 | Performed by: PODIATRIST

## 2021-11-26 PROCEDURE — 99499 NO LOS: ICD-10-PCS | Mod: S$PBB,,, | Performed by: PODIATRIST

## 2021-11-26 PROCEDURE — 99999 PR PBB SHADOW E&M-EST. PATIENT-LVL IV: ICD-10-PCS | Mod: PBBFAC,,, | Performed by: PODIATRIST

## 2021-11-26 PROCEDURE — 99999 PR PBB SHADOW E&M-EST. PATIENT-LVL IV: CPT | Mod: PBBFAC,,, | Performed by: PODIATRIST

## 2021-11-26 PROCEDURE — 11719 TRIM NAIL(S) ANY NUMBER: CPT | Mod: Q9,S$PBB,, | Performed by: PODIATRIST

## 2021-11-26 PROCEDURE — 11719 PR TRIM NAIL(S): ICD-10-PCS | Mod: Q9,S$PBB,, | Performed by: PODIATRIST

## 2021-11-26 PROCEDURE — 11720 DEBRIDE NAIL 1-5: CPT | Mod: 59,Q9,S$PBB, | Performed by: PODIATRIST

## 2021-11-26 PROCEDURE — 11720 DEBRIDE NAIL 1-5: CPT | Mod: Q9,PBBFAC | Performed by: PODIATRIST

## 2022-03-29 ENCOUNTER — OFFICE VISIT (OUTPATIENT)
Dept: PODIATRY | Facility: CLINIC | Age: 87
End: 2022-03-29
Payer: MEDICARE

## 2022-03-29 VITALS — BODY MASS INDEX: 25.7 KG/M2 | WEIGHT: 145.06 LBS | HEIGHT: 63 IN

## 2022-03-29 DIAGNOSIS — B35.1 DERMATOPHYTOSIS OF NAIL: ICD-10-CM

## 2022-03-29 DIAGNOSIS — E11.49 TYPE II DIABETES MELLITUS WITH NEUROLOGICAL MANIFESTATIONS: Primary | ICD-10-CM

## 2022-03-29 DIAGNOSIS — M21.70 LOWER LIMB LENGTH DIFFERENCE: ICD-10-CM

## 2022-03-29 PROCEDURE — 11719 PR TRIM NAIL(S): ICD-10-PCS | Mod: Q9,59,S$PBB, | Performed by: PODIATRIST

## 2022-03-29 PROCEDURE — 99214 OFFICE O/P EST MOD 30 MIN: CPT | Mod: PBBFAC | Performed by: PODIATRIST

## 2022-03-29 PROCEDURE — 99999 PR PBB SHADOW E&M-EST. PATIENT-LVL IV: ICD-10-PCS | Mod: PBBFAC,,, | Performed by: PODIATRIST

## 2022-03-29 PROCEDURE — 11719 TRIM NAIL(S) ANY NUMBER: CPT | Mod: Q9,59,PBBFAC | Performed by: PODIATRIST

## 2022-03-29 PROCEDURE — 11720 DEBRIDE NAIL 1-5: CPT | Mod: Q9,S$PBB,, | Performed by: PODIATRIST

## 2022-03-29 PROCEDURE — 99999 PR PBB SHADOW E&M-EST. PATIENT-LVL IV: CPT | Mod: PBBFAC,,, | Performed by: PODIATRIST

## 2022-03-29 PROCEDURE — 99213 OFFICE O/P EST LOW 20 MIN: CPT | Mod: 25,S$PBB,, | Performed by: PODIATRIST

## 2022-03-29 PROCEDURE — 11720 PR DEBRIDEMENT OF NAIL(S), 1-5: ICD-10-PCS | Mod: Q9,S$PBB,, | Performed by: PODIATRIST

## 2022-03-29 PROCEDURE — 11719 TRIM NAIL(S) ANY NUMBER: CPT | Mod: Q9,59,S$PBB, | Performed by: PODIATRIST

## 2022-03-29 PROCEDURE — 99213 PR OFFICE/OUTPT VISIT, EST, LEVL III, 20-29 MIN: ICD-10-PCS | Mod: 25,S$PBB,, | Performed by: PODIATRIST

## 2022-03-29 PROCEDURE — 11720 DEBRIDE NAIL 1-5: CPT | Mod: 59,Q9,PBBFAC | Performed by: PODIATRIST

## 2022-03-29 RX ORDER — BUPRENORPHINE 5 UG/H
1 PATCH TRANSDERMAL
COMMUNITY
Start: 2021-12-06 | End: 2022-07-19

## 2022-03-29 RX ORDER — QUETIAPINE FUMARATE 25 MG/1
25 TABLET, FILM COATED ORAL NIGHTLY
COMMUNITY
Start: 2022-03-07

## 2022-03-29 RX ORDER — MEMANTINE HYDROCHLORIDE 10 MG/1
10 TABLET ORAL 2 TIMES DAILY
COMMUNITY
Start: 2022-03-07

## 2022-03-29 RX ORDER — BUPRENORPHINE 7.5 UG/H
1 PATCH TRANSDERMAL
COMMUNITY
Start: 2022-03-14 | End: 2022-07-19

## 2022-03-29 NOTE — ADDENDUM NOTE
Addended by: BEATRIZ MARIN on: 3/29/2022 09:30 AM     Modules accepted: Orders, Level of Service

## 2022-03-29 NOTE — PROGRESS NOTES
Subjective:       Patient ID: Rosy Balderrama is a 89 y.o. female.    Chief Complaint: Routine Foot Care (0/10 pain at present. Diabetic PCP: Dr. Zhang last seen 2 weeks ago per pt.)      HPI: Patient presents to the office with the chief complaint of elongated, thickened and dystrophic nail plates to the B/L foot. This patient is a Diabetic Type II, complicated with Peripheral Neuropathy. Patient does follow with Primary Care and/or Endocrinology for management of Diabetes Mellitus. This patient's PMD is Maude Zhang MD. This patient last saw his/her primary care provider on 3/15. Patient presents this afternoon with her daughter.       No results found for: HGBA1C.    Review of patient's allergies indicates:   Allergen Reactions    Alendronate Other (See Comments)    Alendronate sodium     Atorvastatin Other (See Comments)    Ciprofloxacin Other (See Comments)    Codeine Itching    Diazepam Itching    Ibuprofen Other (See Comments)    Oxycodone-acetaminophen Other (See Comments) and Itching    Rofecoxib Other (See Comments)    Seldane     Simvastatin Other (See Comments)       Past Medical History:   Diagnosis Date    *Atrial fibrillation     Allergy     Blood transfusion     Cataract     Diabetes mellitus type II     Hyperlipidemia     Osteoporosis        Family History   Problem Relation Age of Onset    Cancer Mother     Cancer Sister     Diabetes Brother        Social History     Socioeconomic History    Marital status:    Occupational History    Occupation: Retired/Housewife     Comment: Housewife   Tobacco Use    Smoking status: Never Smoker    Smokeless tobacco: Never Used   Substance and Sexual Activity    Alcohol use: No    Drug use: No   Social History Narrative    Patient is a housewife.       Past Surgical History:   Procedure Laterality Date    ANKLE SURGERY      broken ankle    BILATERAL SALPINGOOPHORECTOMY      BREAST BIOPSY      CARPAL TUNNEL RELEASE    "   CHOLECYSTECTOMY      COLONOSCOPY      ERCP      ESOPHAGOGASTRODUODENOSCOPY      FRACTURE SURGERY      KNEE CARTILAGE SURGERY      TOTAL ABDOMINAL HYSTERECTOMY      TOTAL HIP ARTHROPLASTY      TUBAL LIGATION         Review of Systems   Constitutional: Negative for chills, fatigue and fever.   HENT: Negative for hearing loss.    Eyes: Negative for photophobia and visual disturbance.   Respiratory: Negative for cough, chest tightness, shortness of breath and wheezing.    Cardiovascular: Negative for chest pain and palpitations.   Gastrointestinal: Negative for constipation, diarrhea, nausea and vomiting.   Endocrine: Negative for cold intolerance and heat intolerance.   Genitourinary: Negative for flank pain.   Musculoskeletal: Positive for arthralgias. Negative for neck pain and neck stiffness.   Skin: Negative for wound.   Neurological: Positive for numbness. Negative for light-headedness and headaches. Syncope:    Psychiatric/Behavioral: Negative for sleep disturbance.         Objective:   Ht 5' 3" (1.6 m)   Wt 65.8 kg (145 lb 1 oz)   BMI 25.70 kg/m²     Physical Exam  LOWER EXTREMITY PHYSICAL EXAMINATION  VASCULAR: Warm to warm, proximal to distal. Capillary refill time is within normal limits and less  than 3 seconds. Hair growth is sparse on the left and right dorsal foot and at the digits. The left dorsalis pedis pulse is 1/4 and on the right is 1/4, and the left posterior tibial pulse is 1/4 and is 1/4 on the right.     NEUROLOGY: Sensation to light touch is intact. Proprioception is intact, bilateral. Sensation to pin prick is reduced to absent. Vibratory sensation is diminished to the left and right lower extremity. Examination with 5.07 Olla Liliana monofilament reveals that protective sensation is not intact to the left and right plantar surfaces of the foot and digits, as the patient has no sensation/detection at greater than 4 distinct points of contact.     DERMATOLOGY: Moderate xerosis " is noted. There are nail changes which are consistent with onychomycosis on the left and right foot. On the left and the right foot, the 1st and 2nd toe nails are thickened, are dystrophic, with yellow discoloration, and with malodorous subungual debris. These thickened and dystrophic nails are painful to touch and palpation. The remaining nail plates are elongated, and are not suggestive of onychomycosis.        Assessment:     1. Type II diabetes mellitus with neurological manifestations    2. Dermatophytosis of nail    3. Lower limb length difference        Plan:     Type II diabetes mellitus with neurological manifestations  I counseled the patient on his/her Diabetic Mellitus regarding today's clinical examination and objection findings. We did also discuss recent medication changes, pertinent labs and imaging evaluations and other medical consultation notes and progress notes. Greater than 50% of this visit was spent on counseling and coordination of care. Greater than 20 minutes of this appt. was spent on education about the diabetic foot, in relation to PVD and/or neuropathy, and the prevention of limb loss.     Shoe gear is inspected and wear and proper fit/type. Patient is reminded of the importance of good nutrition and blood sugar control to help prevent podiatric complications of diabetes. Patient instructed on proper foot hygeine. We discussed wearing proper shoe gear, daily foot inspections, never walking without protective shoe gear, never putting sharp instruments to feet.  Patient  will continue to monitor the areas daily, inspect feet, wear protective shoe gear when ambulatory, moisturizer to maintain skin integrity.     Patient's DMI/DMII is managed by Primary Care Provider and/or Endocrinology Advanced Practice Provider. As per the most recent glycohemoglobin level, this patient is at goal.    Dermatophytosis of nail  The onychomycotic nail plates, as outlined above, are sharply debrided with  double action nail nipper, and/or with the assistance of a mechanical rotary racquel, with removal of all offending nail and nail border(s), for reduction of pains. Nails are reduced in terms of length, width and girth with removal of subungual debris to facilitate pain free weight bearing and ambulation. The elongated nails as outlined in the objective portion of this note, were trimmed to appropriate length, with a double action nail nipper, for alleviation/reduction of pains as well. Follow up in approx. 3-4 months.    Type II diabetes mellitus with neurological manifestations  Lower limb length difference  -     DIABETIC SHOES FOR HOME USE              Future Appointments   Date Time Provider Department Center   7/29/2022  9:15 AM Daron Leonardo DPM [facility]

## 2022-07-19 ENCOUNTER — ANESTHESIA EVENT (OUTPATIENT)
Dept: SURGERY | Facility: HOSPITAL | Age: 87
DRG: 480 | End: 2022-07-19
Payer: MEDICARE

## 2022-07-19 ENCOUNTER — ANESTHESIA (OUTPATIENT)
Dept: SURGERY | Facility: HOSPITAL | Age: 87
DRG: 480 | End: 2022-07-19
Payer: MEDICARE

## 2022-07-19 ENCOUNTER — HOSPITAL ENCOUNTER (INPATIENT)
Facility: HOSPITAL | Age: 87
LOS: 8 days | Discharge: NURSING, REHAB OR LONG-TERM CARE | DRG: 480 | End: 2022-07-27
Attending: EMERGENCY MEDICINE | Admitting: FAMILY MEDICINE
Payer: MEDICARE
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
## 2022-07-19 DIAGNOSIS — D64.9 ANEMIA, UNSPECIFIED TYPE: ICD-10-CM

## 2022-07-19 DIAGNOSIS — M97.8XXD PERIPROSTHETIC FRACTURE OF FEMUR FOLLOWING TOTAL REPLACEMENT OF HIP, SUBSEQUENT ENCOUNTER: ICD-10-CM

## 2022-07-19 DIAGNOSIS — Z96.649 PERIPROSTHETIC FRACTURE OF FEMUR FOLLOWING TOTAL REPLACEMENT OF HIP, SUBSEQUENT ENCOUNTER: ICD-10-CM

## 2022-07-19 DIAGNOSIS — S89.92XA LEFT LEG INJURY: ICD-10-CM

## 2022-07-19 DIAGNOSIS — N30.00 ACUTE CYSTITIS WITHOUT HEMATURIA: ICD-10-CM

## 2022-07-19 DIAGNOSIS — S72.342A CLOSED DISPLACED SPIRAL FRACTURE OF SHAFT OF LEFT FEMUR, INITIAL ENCOUNTER: Primary | ICD-10-CM
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
## 2022-07-19 PROBLEM — M81.0 OSTEOPOROSIS: Status: ACTIVE | Noted: 2022-07-19

## 2022-07-19 PROBLEM — I48.0 PAROXYSMAL ATRIAL FIBRILLATION: Status: ACTIVE | Noted: 2022-07-19

## 2022-07-19 PROBLEM — S72.92XA CLOSED LEFT FEMORAL FRACTURE: Status: ACTIVE | Noted: 2022-07-19

## 2022-07-19 PROBLEM — M97.8XXA PERIPROSTHETIC FRACTURE OF FEMUR FOLLOWING TOTAL REPLACEMENT OF HIP: Status: ACTIVE | Noted: 2022-07-19

## 2022-07-19 LAB
ABO + RH BLD: NORMAL
ALBUMIN SERPL BCP-MCNC: 3.7 G/DL (ref 3.5–5.2)
ALP SERPL-CCNC: 70 U/L (ref 55–135)
ALT SERPL W/O P-5'-P-CCNC: 19 U/L (ref 10–44)
ANION GAP SERPL CALC-SCNC: 9 MMOL/L (ref 8–16)
AST SERPL-CCNC: 24 U/L (ref 10–40)
BACTERIA #/AREA URNS HPF: ABNORMAL /HPF
BASOPHILS # BLD AUTO: 0.01 K/UL (ref 0–0.2)
BASOPHILS # BLD AUTO: 0.02 K/UL (ref 0–0.2)
BASOPHILS NFR BLD: 0.1 % (ref 0–1.9)
BASOPHILS NFR BLD: 0.2 % (ref 0–1.9)
BILIRUB SERPL-MCNC: 0.5 MG/DL (ref 0.1–1)
BILIRUB UR QL STRIP: NEGATIVE
BLD GP AB SCN CELLS X3 SERPL QL: NORMAL
BUN SERPL-MCNC: 36 MG/DL (ref 8–23)
CALCIUM SERPL-MCNC: 9.1 MG/DL (ref 8.7–10.5)
CHLORIDE SERPL-SCNC: 111 MMOL/L (ref 95–110)
CK SERPL-CCNC: 206 U/L (ref 20–180)
CLARITY UR: ABNORMAL
CO2 SERPL-SCNC: 24 MMOL/L (ref 23–29)
COLOR UR: YELLOW
CREAT SERPL-MCNC: 1.1 MG/DL (ref 0.5–1.4)
DIFFERENTIAL METHOD: ABNORMAL
DIFFERENTIAL METHOD: ABNORMAL
EOSINOPHIL # BLD AUTO: 0 K/UL (ref 0–0.5)
EOSINOPHIL # BLD AUTO: 0.1 K/UL (ref 0–0.5)
EOSINOPHIL NFR BLD: 0.1 % (ref 0–8)
EOSINOPHIL NFR BLD: 0.5 % (ref 0–8)
ERYTHROCYTE [DISTWIDTH] IN BLOOD BY AUTOMATED COUNT: 14 % (ref 11.5–14.5)
ERYTHROCYTE [DISTWIDTH] IN BLOOD BY AUTOMATED COUNT: 18 % (ref 11.5–14.5)
EST. GFR  (AFRICAN AMERICAN): 51 ML/MIN/1.73 M^2
EST. GFR  (NON AFRICAN AMERICAN): 44 ML/MIN/1.73 M^2
ESTIMATED AVG GLUCOSE: 105 MG/DL (ref 68–131)
FOLATE SERPL-MCNC: 14.5 NG/ML (ref 4–24)
GLUCOSE SERPL-MCNC: 257 MG/DL (ref 70–110)
GLUCOSE UR QL STRIP: NEGATIVE
HBA1C MFR BLD: 5.3 % (ref 4–5.6)
HCT VFR BLD AUTO: 27 % (ref 37–48.5)
HCT VFR BLD AUTO: 27.7 % (ref 37–48.5)
HGB BLD-MCNC: 8.8 G/DL (ref 12–16)
HGB BLD-MCNC: 9.1 G/DL (ref 12–16)
HGB UR QL STRIP: NEGATIVE
HYALINE CASTS #/AREA URNS LPF: 6 /LPF
IMM GRANULOCYTES # BLD AUTO: 0.05 K/UL (ref 0–0.04)
IMM GRANULOCYTES # BLD AUTO: 0.1 K/UL (ref 0–0.04)
IMM GRANULOCYTES NFR BLD AUTO: 0.4 % (ref 0–0.5)
IMM GRANULOCYTES NFR BLD AUTO: 0.8 % (ref 0–0.5)
KETONES UR QL STRIP: NEGATIVE
LEUKOCYTE ESTERASE UR QL STRIP: ABNORMAL
LYMPHOCYTES # BLD AUTO: 0.6 K/UL (ref 1–4.8)
LYMPHOCYTES # BLD AUTO: 1 K/UL (ref 1–4.8)
LYMPHOCYTES NFR BLD: 4.8 % (ref 18–48)
LYMPHOCYTES NFR BLD: 7.9 % (ref 18–48)
MCH RBC QN AUTO: 33.1 PG (ref 27–31)
MCH RBC QN AUTO: 33.7 PG (ref 27–31)
MCHC RBC AUTO-ENTMCNC: 31.8 G/DL (ref 32–36)
MCHC RBC AUTO-ENTMCNC: 33.7 G/DL (ref 32–36)
MCV RBC AUTO: 106 FL (ref 82–98)
MCV RBC AUTO: 98 FL (ref 82–98)
MICROSCOPIC COMMENT: ABNORMAL
MONOCYTES # BLD AUTO: 0.5 K/UL (ref 0.3–1)
MONOCYTES # BLD AUTO: 0.6 K/UL (ref 0.3–1)
MONOCYTES NFR BLD: 3.9 % (ref 4–15)
MONOCYTES NFR BLD: 5 % (ref 4–15)
NEUTROPHILS # BLD AUTO: 10.6 K/UL (ref 1.8–7.7)
NEUTROPHILS # BLD AUTO: 11.5 K/UL (ref 1.8–7.7)
NEUTROPHILS NFR BLD: 86.4 % (ref 38–73)
NEUTROPHILS NFR BLD: 89.9 % (ref 38–73)
NITRITE UR QL STRIP: POSITIVE
NRBC BLD-RTO: 0 /100 WBC
NRBC BLD-RTO: 0 /100 WBC
PH UR STRIP: 6 [PH] (ref 5–8)
PLATELET # BLD AUTO: 165 K/UL (ref 150–450)
PLATELET # BLD AUTO: 176 K/UL (ref 150–450)
PMV BLD AUTO: 10.7 FL (ref 9.2–12.9)
PMV BLD AUTO: 10.8 FL (ref 9.2–12.9)
POCT GLUCOSE: 186 MG/DL (ref 70–110)
POCT GLUCOSE: 193 MG/DL (ref 70–110)
POTASSIUM SERPL-SCNC: 4.3 MMOL/L (ref 3.5–5.1)
PROT SERPL-MCNC: 6.5 G/DL (ref 6–8.4)
PROT UR QL STRIP: NEGATIVE
PTH-INTACT SERPL-MCNC: 86.5 PG/ML (ref 9–77)
RBC # BLD AUTO: 2.61 M/UL (ref 4–5.4)
RBC # BLD AUTO: 2.75 M/UL (ref 4–5.4)
RBC #/AREA URNS HPF: 1 /HPF (ref 0–4)
SARS-COV-2 RDRP RESP QL NAA+PROBE: NEGATIVE
SODIUM SERPL-SCNC: 144 MMOL/L (ref 136–145)
SP GR UR STRIP: 1.01 (ref 1–1.03)
URN SPEC COLLECT METH UR: ABNORMAL
UROBILINOGEN UR STRIP-ACNC: NEGATIVE EU/DL
VIT B12 SERPL-MCNC: 422 PG/ML (ref 210–950)
WBC # BLD AUTO: 12.27 K/UL (ref 3.9–12.7)
WBC # BLD AUTO: 12.83 K/UL (ref 3.9–12.7)
WBC #/AREA URNS HPF: 4 /HPF (ref 0–5)
WBC CLUMPS URNS QL MICRO: ABNORMAL

## 2022-07-19 PROCEDURE — C1713 ANCHOR/SCREW BN/BN,TIS/BN: HCPCS | Performed by: ORTHOPAEDIC SURGERY

## 2022-07-19 PROCEDURE — 93010 ELECTROCARDIOGRAM REPORT: CPT | Mod: ,,, | Performed by: INTERNAL MEDICINE

## 2022-07-19 PROCEDURE — 63600175 PHARM REV CODE 636 W HCPCS: Performed by: ORTHOPAEDIC SURGERY

## 2022-07-19 PROCEDURE — 86901 BLOOD TYPING SEROLOGIC RH(D): CPT | Performed by: ORTHOPAEDIC SURGERY

## 2022-07-19 PROCEDURE — 81000 URINALYSIS NONAUTO W/SCOPE: CPT | Performed by: EMERGENCY MEDICINE

## 2022-07-19 PROCEDURE — 36000711: Performed by: ORTHOPAEDIC SURGERY

## 2022-07-19 PROCEDURE — 37000008 HC ANESTHESIA 1ST 15 MINUTES: Performed by: ORTHOPAEDIC SURGERY

## 2022-07-19 PROCEDURE — 96375 TX/PRO/DX INJ NEW DRUG ADDON: CPT

## 2022-07-19 PROCEDURE — 85025 COMPLETE CBC W/AUTO DIFF WBC: CPT | Performed by: EMERGENCY MEDICINE

## 2022-07-19 PROCEDURE — 82550 ASSAY OF CK (CPK): CPT | Performed by: EMERGENCY MEDICINE

## 2022-07-19 PROCEDURE — 86920 COMPATIBILITY TEST SPIN: CPT | Performed by: ORTHOPAEDIC SURGERY

## 2022-07-19 PROCEDURE — 96365 THER/PROPH/DIAG IV INF INIT: CPT

## 2022-07-19 PROCEDURE — C9290 INJ, BUPIVACAINE LIPOSOME: HCPCS | Performed by: ORTHOPAEDIC SURGERY

## 2022-07-19 PROCEDURE — 27507 PR OPEN RX FEMUR FX+PLATE/SCREW: ICD-10-PCS | Mod: LT,,, | Performed by: ORTHOPAEDIC SURGERY

## 2022-07-19 PROCEDURE — 80053 COMPREHEN METABOLIC PANEL: CPT | Performed by: EMERGENCY MEDICINE

## 2022-07-19 PROCEDURE — 83036 HEMOGLOBIN GLYCOSYLATED A1C: CPT | Performed by: FAMILY MEDICINE

## 2022-07-19 PROCEDURE — 25000003 PHARM REV CODE 250: Performed by: ANESTHESIOLOGY

## 2022-07-19 PROCEDURE — 25000003 PHARM REV CODE 250: Performed by: FAMILY MEDICINE

## 2022-07-19 PROCEDURE — 36000710: Performed by: ORTHOPAEDIC SURGERY

## 2022-07-19 PROCEDURE — 37000009 HC ANESTHESIA EA ADD 15 MINS: Performed by: ORTHOPAEDIC SURGERY

## 2022-07-19 PROCEDURE — 36415 COLL VENOUS BLD VENIPUNCTURE: CPT | Performed by: FAMILY MEDICINE

## 2022-07-19 PROCEDURE — C1769 GUIDE WIRE: HCPCS | Performed by: ORTHOPAEDIC SURGERY

## 2022-07-19 PROCEDURE — 11000001 HC ACUTE MED/SURG PRIVATE ROOM

## 2022-07-19 PROCEDURE — 27507 TREATMENT OF THIGH FRACTURE: CPT | Mod: LT,,, | Performed by: ORTHOPAEDIC SURGERY

## 2022-07-19 PROCEDURE — 82607 VITAMIN B-12: CPT | Performed by: FAMILY MEDICINE

## 2022-07-19 PROCEDURE — P9016 RBC LEUKOCYTES REDUCED: HCPCS | Performed by: ORTHOPAEDIC SURGERY

## 2022-07-19 PROCEDURE — 82306 VITAMIN D 25 HYDROXY: CPT | Performed by: ORTHOPAEDIC SURGERY

## 2022-07-19 PROCEDURE — U0002 COVID-19 LAB TEST NON-CDC: HCPCS | Performed by: EMERGENCY MEDICINE

## 2022-07-19 PROCEDURE — 83970 ASSAY OF PARATHORMONE: CPT | Performed by: ORTHOPAEDIC SURGERY

## 2022-07-19 PROCEDURE — 93010 EKG 12-LEAD: ICD-10-PCS | Mod: ,,, | Performed by: INTERNAL MEDICINE

## 2022-07-19 PROCEDURE — 63600175 PHARM REV CODE 636 W HCPCS: Performed by: NURSE ANESTHETIST, CERTIFIED REGISTERED

## 2022-07-19 PROCEDURE — 25000003 PHARM REV CODE 250: Performed by: NURSE ANESTHETIST, CERTIFIED REGISTERED

## 2022-07-19 PROCEDURE — 25000003 PHARM REV CODE 250: Performed by: ORTHOPAEDIC SURGERY

## 2022-07-19 PROCEDURE — 27201423 OPTIME MED/SURG SUP & DEVICES STERILE SUPPLY: Performed by: ORTHOPAEDIC SURGERY

## 2022-07-19 PROCEDURE — 71000033 HC RECOVERY, INTIAL HOUR: Performed by: ORTHOPAEDIC SURGERY

## 2022-07-19 PROCEDURE — 99285 EMERGENCY DEPT VISIT HI MDM: CPT | Mod: 25

## 2022-07-19 PROCEDURE — 82746 ASSAY OF FOLIC ACID SERUM: CPT | Performed by: FAMILY MEDICINE

## 2022-07-19 PROCEDURE — 36415 COLL VENOUS BLD VENIPUNCTURE: CPT | Performed by: ORTHOPAEDIC SURGERY

## 2022-07-19 PROCEDURE — 85025 COMPLETE CBC W/AUTO DIFF WBC: CPT | Mod: 91 | Performed by: ORTHOPAEDIC SURGERY

## 2022-07-19 PROCEDURE — 63600175 PHARM REV CODE 636 W HCPCS: Performed by: EMERGENCY MEDICINE

## 2022-07-19 PROCEDURE — 63600175 PHARM REV CODE 636 W HCPCS: Performed by: ANESTHESIOLOGY

## 2022-07-19 PROCEDURE — 93005 ELECTROCARDIOGRAM TRACING: CPT

## 2022-07-19 DEVICE — SCREW AXSOS LOCKING 5X80MM: Type: IMPLANTABLE DEVICE | Site: FEMUR | Status: FUNCTIONAL

## 2022-07-19 DEVICE — IMPLANTABLE DEVICE: Type: IMPLANTABLE DEVICE | Site: FEMUR | Status: FUNCTIONAL

## 2022-07-19 DEVICE — SCREW AXSOS CORT ST 4.5X40MM: Type: IMPLANTABLE DEVICE | Site: FEMUR | Status: FUNCTIONAL

## 2022-07-19 DEVICE — SCREW AXSOS LOCKING 5X75MM: Type: IMPLANTABLE DEVICE | Site: FEMUR | Status: FUNCTIONAL

## 2022-07-19 RX ORDER — ONDANSETRON 4 MG/1
4 TABLET, ORALLY DISINTEGRATING ORAL EVERY 6 HOURS PRN
Status: DISCONTINUED | OUTPATIENT
Start: 2022-07-19 | End: 2022-07-27 | Stop reason: HOSPADM

## 2022-07-19 RX ORDER — ONDANSETRON 2 MG/ML
4 INJECTION INTRAMUSCULAR; INTRAVENOUS
Status: COMPLETED | OUTPATIENT
Start: 2022-07-19 | End: 2022-07-19

## 2022-07-19 RX ORDER — METOPROLOL SUCCINATE 25 MG/1
25 TABLET, EXTENDED RELEASE ORAL DAILY
Status: DISCONTINUED | OUTPATIENT
Start: 2022-07-20 | End: 2022-07-27 | Stop reason: HOSPADM

## 2022-07-19 RX ORDER — TRANEXAMIC ACID 100 MG/ML
INJECTION, SOLUTION INTRAVENOUS
Status: DISCONTINUED | OUTPATIENT
Start: 2022-07-19 | End: 2022-07-19

## 2022-07-19 RX ORDER — LIDOCAINE HYDROCHLORIDE 20 MG/ML
INJECTION, SOLUTION EPIDURAL; INFILTRATION; INTRACAUDAL; PERINEURAL
Status: DISCONTINUED | OUTPATIENT
Start: 2022-07-19 | End: 2022-07-19

## 2022-07-19 RX ORDER — CHOLECALCIFEROL (VITAMIN D3) 25 MCG
1000 TABLET ORAL DAILY
Status: DISCONTINUED | OUTPATIENT
Start: 2022-07-19 | End: 2022-07-27 | Stop reason: HOSPADM

## 2022-07-19 RX ORDER — FENTANYL CITRATE 50 UG/ML
INJECTION, SOLUTION INTRAMUSCULAR; INTRAVENOUS
Status: DISCONTINUED | OUTPATIENT
Start: 2022-07-19 | End: 2022-07-19

## 2022-07-19 RX ORDER — NAPROXEN SODIUM 220 MG
220 TABLET ORAL 2 TIMES DAILY WITH MEALS
COMMUNITY

## 2022-07-19 RX ORDER — HYDROCODONE BITARTRATE AND ACETAMINOPHEN 500; 5 MG/1; MG/1
TABLET ORAL
Status: DISCONTINUED | OUTPATIENT
Start: 2022-07-19 | End: 2022-07-19 | Stop reason: HOSPADM

## 2022-07-19 RX ORDER — HYDROMORPHONE HYDROCHLORIDE 2 MG/ML
0.2 INJECTION, SOLUTION INTRAMUSCULAR; INTRAVENOUS; SUBCUTANEOUS EVERY 5 MIN PRN
Status: DISCONTINUED | OUTPATIENT
Start: 2022-07-19 | End: 2022-07-19 | Stop reason: HOSPADM

## 2022-07-19 RX ORDER — TRAMADOL HYDROCHLORIDE 50 MG/1
50 TABLET ORAL EVERY 4 HOURS PRN
Status: DISCONTINUED | OUTPATIENT
Start: 2022-07-19 | End: 2022-07-20

## 2022-07-19 RX ORDER — CEFAZOLIN SODIUM 2 G/50ML
2 SOLUTION INTRAVENOUS
Status: COMPLETED | OUTPATIENT
Start: 2022-07-20 | End: 2022-07-20

## 2022-07-19 RX ORDER — DOCUSATE SODIUM 100 MG/1
100 CAPSULE, LIQUID FILLED ORAL 2 TIMES DAILY
Status: DISCONTINUED | OUTPATIENT
Start: 2022-07-19 | End: 2022-07-27 | Stop reason: HOSPADM

## 2022-07-19 RX ORDER — CHOLECALCIFEROL (VITAMIN D3) 25 MCG
1000 TABLET ORAL DAILY
COMMUNITY

## 2022-07-19 RX ORDER — SODIUM CHLORIDE 0.9 % (FLUSH) 0.9 %
10 SYRINGE (ML) INJECTION
Status: DISCONTINUED | OUTPATIENT
Start: 2022-07-19 | End: 2022-07-27 | Stop reason: HOSPADM

## 2022-07-19 RX ORDER — HYDROCODONE BITARTRATE AND ACETAMINOPHEN 7.5; 325 MG/1; MG/1
1 TABLET ORAL EVERY 6 HOURS PRN
Status: DISCONTINUED | OUTPATIENT
Start: 2022-07-19 | End: 2022-07-19

## 2022-07-19 RX ORDER — MINERAL OIL
180 ENEMA (ML) RECTAL DAILY
COMMUNITY

## 2022-07-19 RX ORDER — MEMANTINE HYDROCHLORIDE 10 MG/1
10 TABLET ORAL 2 TIMES DAILY
Status: DISCONTINUED | OUTPATIENT
Start: 2022-07-19 | End: 2022-07-27 | Stop reason: HOSPADM

## 2022-07-19 RX ORDER — FENTANYL CITRATE 50 UG/ML
75 INJECTION, SOLUTION INTRAMUSCULAR; INTRAVENOUS
Status: COMPLETED | OUTPATIENT
Start: 2022-07-19 | End: 2022-07-19

## 2022-07-19 RX ORDER — ACETAMINOPHEN 325 MG/1
650 TABLET ORAL EVERY 6 HOURS PRN
Status: DISCONTINUED | OUTPATIENT
Start: 2022-07-19 | End: 2022-07-22

## 2022-07-19 RX ORDER — ROCURONIUM BROMIDE 10 MG/ML
INJECTION, SOLUTION INTRAVENOUS
Status: DISCONTINUED | OUTPATIENT
Start: 2022-07-19 | End: 2022-07-19

## 2022-07-19 RX ORDER — HYDROCODONE BITARTRATE AND ACETAMINOPHEN 5; 325 MG/1; MG/1
1 TABLET ORAL
Status: DISCONTINUED | OUTPATIENT
Start: 2022-07-19 | End: 2022-07-19 | Stop reason: HOSPADM

## 2022-07-19 RX ORDER — PROPOFOL 10 MG/ML
VIAL (ML) INTRAVENOUS
Status: DISCONTINUED | OUTPATIENT
Start: 2022-07-19 | End: 2022-07-19

## 2022-07-19 RX ORDER — SODIUM CHLORIDE 0.9 % (FLUSH) 0.9 %
10 SYRINGE (ML) INJECTION
Status: DISCONTINUED | OUTPATIENT
Start: 2022-07-19 | End: 2022-07-20

## 2022-07-19 RX ORDER — AMOXICILLIN 500 MG
1 CAPSULE ORAL DAILY
COMMUNITY

## 2022-07-19 RX ORDER — EPHEDRINE SULFATE 50 MG/ML
INJECTION, SOLUTION INTRAVENOUS
Status: DISCONTINUED | OUTPATIENT
Start: 2022-07-19 | End: 2022-07-19

## 2022-07-19 RX ORDER — ENOXAPARIN SODIUM 100 MG/ML
40 INJECTION SUBCUTANEOUS EVERY 24 HOURS
Status: DISCONTINUED | OUTPATIENT
Start: 2022-07-19 | End: 2022-07-19

## 2022-07-19 RX ORDER — BUPIVACAINE HYDROCHLORIDE 2.5 MG/ML
INJECTION, SOLUTION EPIDURAL; INFILTRATION; INTRACAUDAL
Status: DISCONTINUED | OUTPATIENT
Start: 2022-07-19 | End: 2022-07-19 | Stop reason: HOSPADM

## 2022-07-19 RX ORDER — HYDROCODONE BITARTRATE AND ACETAMINOPHEN 5; 325 MG/1; MG/1
1 TABLET ORAL EVERY 4 HOURS PRN
Status: DISCONTINUED | OUTPATIENT
Start: 2022-07-19 | End: 2022-07-21

## 2022-07-19 RX ORDER — RALOXIFENE HYDROCHLORIDE 60 MG/1
60 TABLET, FILM COATED ORAL DAILY
Status: DISCONTINUED | OUTPATIENT
Start: 2022-07-19 | End: 2022-07-27 | Stop reason: HOSPADM

## 2022-07-19 RX ORDER — GLUCAGON 1 MG
1 KIT INJECTION
Status: DISCONTINUED | OUTPATIENT
Start: 2022-07-19 | End: 2022-07-27 | Stop reason: HOSPADM

## 2022-07-19 RX ORDER — BUMETANIDE 1 MG/1
1 TABLET ORAL DAILY
Status: DISCONTINUED | OUTPATIENT
Start: 2022-07-20 | End: 2022-07-20

## 2022-07-19 RX ORDER — ONDANSETRON 2 MG/ML
4 INJECTION INTRAMUSCULAR; INTRAVENOUS DAILY PRN
Status: DISCONTINUED | OUTPATIENT
Start: 2022-07-19 | End: 2022-07-19 | Stop reason: HOSPADM

## 2022-07-19 RX ORDER — CHLORHEXIDINE GLUCONATE ORAL RINSE 1.2 MG/ML
10 SOLUTION DENTAL 2 TIMES DAILY
Status: DISPENSED | OUTPATIENT
Start: 2022-07-19 | End: 2022-07-24

## 2022-07-19 RX ORDER — SODIUM CHLORIDE 9 MG/ML
INJECTION, SOLUTION INTRAVENOUS CONTINUOUS
Status: DISCONTINUED | OUTPATIENT
Start: 2022-07-20 | End: 2022-07-27 | Stop reason: HOSPADM

## 2022-07-19 RX ORDER — QUETIAPINE FUMARATE 25 MG/1
25 TABLET, FILM COATED ORAL NIGHTLY
Status: DISCONTINUED | OUTPATIENT
Start: 2022-07-19 | End: 2022-07-27 | Stop reason: HOSPADM

## 2022-07-19 RX ORDER — INSULIN ASPART 100 [IU]/ML
0-5 INJECTION, SOLUTION INTRAVENOUS; SUBCUTANEOUS EVERY 6 HOURS PRN
Status: DISCONTINUED | OUTPATIENT
Start: 2022-07-19 | End: 2022-07-27 | Stop reason: HOSPADM

## 2022-07-19 RX ORDER — ENOXAPARIN SODIUM 100 MG/ML
40 INJECTION SUBCUTANEOUS EVERY 24 HOURS
Status: DISCONTINUED | OUTPATIENT
Start: 2022-07-20 | End: 2022-07-20

## 2022-07-19 RX ORDER — PRAVASTATIN SODIUM 20 MG/1
40 TABLET ORAL DAILY
Status: DISCONTINUED | OUTPATIENT
Start: 2022-07-20 | End: 2022-07-27 | Stop reason: HOSPADM

## 2022-07-19 RX ORDER — ONDANSETRON HYDROCHLORIDE 2 MG/ML
INJECTION, SOLUTION INTRAMUSCULAR; INTRAVENOUS
Status: DISCONTINUED | OUTPATIENT
Start: 2022-07-19 | End: 2022-07-19

## 2022-07-19 RX ORDER — MORPHINE SULFATE 4 MG/ML
2 INJECTION, SOLUTION INTRAMUSCULAR; INTRAVENOUS EVERY 6 HOURS PRN
Status: DISCONTINUED | OUTPATIENT
Start: 2022-07-19 | End: 2022-07-19

## 2022-07-19 RX ORDER — PHENYLEPHRINE HYDROCHLORIDE 10 MG/ML
INJECTION INTRAVENOUS
Status: DISCONTINUED | OUTPATIENT
Start: 2022-07-19 | End: 2022-07-19

## 2022-07-19 RX ADMIN — FENTANYL CITRATE 25 MCG: 50 INJECTION, SOLUTION INTRAMUSCULAR; INTRAVENOUS at 08:07

## 2022-07-19 RX ADMIN — HYDROMORPHONE HYDROCHLORIDE 0.2 MG: 2 INJECTION, SOLUTION INTRAMUSCULAR; INTRAVENOUS; SUBCUTANEOUS at 09:07

## 2022-07-19 RX ADMIN — HYDROCODONE BITARTRATE AND ACETAMINOPHEN 1 TABLET: 5; 325 TABLET ORAL at 09:07

## 2022-07-19 RX ADMIN — MEMANTINE 10 MG: 10 TABLET ORAL at 10:07

## 2022-07-19 RX ADMIN — EPHEDRINE SULFATE 10 MG: 50 INJECTION INTRAVENOUS at 05:07

## 2022-07-19 RX ADMIN — FENTANYL CITRATE 50 MCG: 50 INJECTION, SOLUTION INTRAMUSCULAR; INTRAVENOUS at 05:07

## 2022-07-19 RX ADMIN — CEFTRIAXONE 1 G: 1 INJECTION, SOLUTION INTRAVENOUS at 12:07

## 2022-07-19 RX ADMIN — ROCURONIUM BROMIDE 40 MG: 10 INJECTION, SOLUTION INTRAVENOUS at 05:07

## 2022-07-19 RX ADMIN — FENTANYL CITRATE 75 MCG: 50 INJECTION INTRAMUSCULAR; INTRAVENOUS at 11:07

## 2022-07-19 RX ADMIN — EPHEDRINE SULFATE 20 MG: 50 INJECTION INTRAVENOUS at 05:07

## 2022-07-19 RX ADMIN — ONDANSETRON 4 MG: 2 INJECTION INTRAMUSCULAR; INTRAVENOUS at 11:07

## 2022-07-19 RX ADMIN — SUGAMMADEX 200 MG: 100 INJECTION, SOLUTION INTRAVENOUS at 08:07

## 2022-07-19 RX ADMIN — DOCUSATE SODIUM 100 MG: 100 CAPSULE, LIQUID FILLED ORAL at 10:07

## 2022-07-19 RX ADMIN — TRANEXAMIC ACID 1000 MG: 100 INJECTION, SOLUTION INTRAVENOUS at 06:07

## 2022-07-19 RX ADMIN — SODIUM CHLORIDE: 0.9 INJECTION, SOLUTION INTRAVENOUS at 10:07

## 2022-07-19 RX ADMIN — LIDOCAINE HYDROCHLORIDE 50 MG: 20 INJECTION, SOLUTION EPIDURAL; INFILTRATION; INTRACAUDAL; PERINEURAL at 05:07

## 2022-07-19 RX ADMIN — PHENYLEPHRINE HYDROCHLORIDE 200 MCG: 10 INJECTION INTRAVENOUS at 08:07

## 2022-07-19 RX ADMIN — ROCURONIUM BROMIDE 10 MG: 10 INJECTION, SOLUTION INTRAVENOUS at 06:07

## 2022-07-19 RX ADMIN — FENTANYL CITRATE 25 MCG: 50 INJECTION, SOLUTION INTRAMUSCULAR; INTRAVENOUS at 05:07

## 2022-07-19 RX ADMIN — ROCURONIUM BROMIDE 20 MG: 10 INJECTION, SOLUTION INTRAVENOUS at 07:07

## 2022-07-19 RX ADMIN — CEFAZOLIN 2 G: 1 INJECTION, POWDER, FOR SOLUTION INTRAMUSCULAR; INTRAVENOUS at 05:07

## 2022-07-19 RX ADMIN — ONDANSETRON 4 MG: 2 INJECTION, SOLUTION INTRAMUSCULAR; INTRAVENOUS at 08:07

## 2022-07-19 RX ADMIN — ROCURONIUM BROMIDE 20 MG: 10 INJECTION, SOLUTION INTRAVENOUS at 06:07

## 2022-07-19 RX ADMIN — PROPOFOL 100 MG: 10 INJECTION, EMULSION INTRAVENOUS at 05:07

## 2022-07-19 RX ADMIN — SODIUM CHLORIDE, SODIUM LACTATE, POTASSIUM CHLORIDE, AND CALCIUM CHLORIDE: .6; .31; .03; .02 INJECTION, SOLUTION INTRAVENOUS at 06:07

## 2022-07-19 RX ADMIN — QUETIAPINE FUMARATE 25 MG: 25 TABLET ORAL at 10:07

## 2022-07-19 RX ADMIN — CHLORHEXIDINE GLUCONATE 0.12% ORAL RINSE 10 ML: 1.2 LIQUID ORAL at 10:07

## 2022-07-19 RX ADMIN — SODIUM CHLORIDE, SODIUM LACTATE, POTASSIUM CHLORIDE, AND CALCIUM CHLORIDE: .6; .31; .03; .02 INJECTION, SOLUTION INTRAVENOUS at 05:07

## 2022-07-19 NOTE — SUBJECTIVE & OBJECTIVE
Past Medical History:   Diagnosis Date    *Atrial fibrillation     Allergy     Blood transfusion     Cataract     Diabetes mellitus type II     Hyperlipidemia     Osteoporosis        Past Surgical History:   Procedure Laterality Date    ANKLE SURGERY      broken ankle    BILATERAL SALPINGOOPHORECTOMY      BREAST BIOPSY      CARPAL TUNNEL RELEASE      CHOLECYSTECTOMY      COLONOSCOPY      ERCP      ESOPHAGOGASTRODUODENOSCOPY      FRACTURE SURGERY      KNEE CARTILAGE SURGERY      TOTAL ABDOMINAL HYSTERECTOMY      TOTAL HIP ARTHROPLASTY      TUBAL LIGATION         Review of patient's allergies indicates:   Allergen Reactions    Alendronate Other (See Comments)    Atorvastatin Other (See Comments)    Ciprofloxacin Other (See Comments)    Codeine Itching    Diazepam Itching    Ibuprofen Other (See Comments)    Oxycodone-acetaminophen Other (See Comments) and Itching    Rofecoxib Other (See Comments)    Seldane     Simvastatin Other (See Comments)       Current Facility-Administered Medications   Medication    [START ON 7/20/2022] 0.9%  NaCl infusion    acetaminophen tablet 650 mg    [START ON 7/20/2022] bumetanide tablet 1 mg    dextrose 10% bolus 125 mL    dextrose 10% bolus 250 mL    glucagon (human recombinant) injection 1 mg    HYDROcodone-acetaminophen 7.5-325 mg per tablet 1 tablet    insulin aspart U-100 pen 0-5 Units    memantine tablet 10 mg    [START ON 7/20/2022] metoprolol succinate (TOPROL-XL) 24 hr tablet 25 mg    morphine injection 2 mg    ondansetron disintegrating tablet 4 mg    [START ON 7/20/2022] pravastatin tablet 40 mg    QUEtiapine tablet 25 mg    raloxifene tablet 60 mg    vitamin D 1000 units tablet 1,000 Units     Current Outpatient Medications   Medication Sig    AMINO ACIDS/MINERALS (A/G PRO ORAL) Take 2 tablets by mouth 2 (two) times a day.    ANTIOX#10/OM3/DHA/EPA/LUT/ZEAX (I-CAPS ORAL) Take 1 capsule by mouth once daily.    aspirin 81 MG Chew Take 81 mg by mouth nightly.    bumetanide  (BUMEX) 1 MG tablet Take 1 mg by mouth once daily.    CALCIUM CARBONATE/VITAMIN D3 (CALTRATE 600 + D ORAL) Take 1 tablet by mouth once daily.    cranberry fruit concentrate (AZO CRANBERRY ORAL) Take 1 tablet by mouth 2 (two) times a day.    fexofenadine (ALLEGRA) 180 MG tablet Take 180 mg by mouth once daily.    memantine (NAMENDA) 10 MG Tab Take 10 mg by mouth 2 (two) times daily.    metoprolol tartrate (LOPRESSOR) 25 MG tablet Take 12.5 mg by mouth 2 (two) times daily.    MULTIVITAMIN ORAL Take 1 tablet by mouth once daily.    naproxen sodium (ANAPROX) 220 MG tablet Take 220 mg by mouth 2 (two) times daily with meals.    omega-3 fatty acids/fish oil (FISH OIL-OMEGA-3 FATTY ACIDS) 300-1,000 mg capsule Take 1 capsule by mouth once daily.    pravastatin (PRAVACHOL) 40 MG tablet Take 40 mg by mouth once daily.    QUEtiapine (SEROQUEL) 25 MG Tab Take 25 mg by mouth nightly.    raloxifene (EVISTA) 60 mg tablet Take 60 mg by mouth once daily.    sitagliptan (JANUVIA) 100 MG Tab Take 100 mg by mouth once daily.    vitamin D (VITAMIN D3) 1000 units Tab Take 1,000 Units by mouth once daily.     Family History       Problem Relation (Age of Onset)    Cancer Mother, Sister    Diabetes Brother          Tobacco Use    Smoking status: Never Smoker    Smokeless tobacco: Never Used   Substance and Sexual Activity    Alcohol use: No    Drug use: No    Sexual activity: Not on file     Review of Systems   Constitutional: Negative for decreased appetite.   Cardiovascular:  Negative for chest pain.   Respiratory:  Negative for cough.    Hematologic/Lymphatic: Does not bruise/bleed easily.   Musculoskeletal:  Positive for falls, joint pain and myalgias.   Gastrointestinal:  Negative for abdominal pain, nausea and vomiting.   Neurological:  Negative for weakness.   Psychiatric/Behavioral:  Positive for memory loss.    All other systems reviewed and are negative.  Objective:     Vital Signs (Most Recent):  Temp: 98.3 °F (36.8 °C)  (07/19/22 1300)  Pulse: 66 (07/19/22 1300)  Resp: 18 (07/19/22 1134)  BP: 137/71 (07/19/22 1300)  SpO2: 99 % (07/19/22 1300) Vital Signs (24h Range):  Temp:  [98.3 °F (36.8 °C)-98.7 °F (37.1 °C)] 98.3 °F (36.8 °C)  Pulse:  [54-66] 66  Resp:  [16-18] 18  SpO2:  [98 %-99 %] 99 %  BP: (125-137)/(69-71) 137/71     Weight: 72 kg (158 lb 12.8 oz)     Body mass index is 28.13 kg/m².      Intake/Output Summary (Last 24 hours) at 7/19/2022 1541  Last data filed at 7/19/2022 1317  Gross per 24 hour   Intake 50 ml   Output --   Net 50 ml       General    Nursing note and vitals reviewed.  Constitutional: She is oriented to person, place, and time. She appears well-developed and well-nourished. No distress.   HENT:   Head: Normocephalic and atraumatic.   Eyes: EOM are normal. No scleral icterus.   Cardiovascular:  Intact distal pulses.            Pulmonary/Chest: Effort normal. No respiratory distress.   Abdominal: She exhibits no distension.   Neurological: She is alert and oriented to person, place, and time.   Psychiatric: She has a normal mood and affect. Her behavior is normal. Judgment and thought content normal.         Left Hip Exam     Comments:  LLE: skin is warm dry and intact. Obvious deformity noted in thigh.  Compartments are soft, sensation is grossly intact to light touch. Brisk cap refill to toes. Well healed prior wound over lateral knee.          Significant Labs:   Recent Lab Results         07/19/22  1332   07/19/22  1141   07/19/22  1137   07/19/22  1132        Albumin       3.7       Alkaline Phosphatase       70       ALT       19       Anion Gap       9       Appearance, UA     Hazy         AST       24       Bacteria, UA     Occasional         Baso #       0.01       Basophil %       0.1       Bilirubin (UA)     Negative         BILIRUBIN TOTAL       0.5  Comment: For infants and newborns, interpretation of results should be based  on gestational age, weight and in agreement with  clinical  observations.    Premature Infant recommended reference ranges:  Up to 24 hours.............<8.0 mg/dL  Up to 48 hours............<12.0 mg/dL  3-5 days..................<15.0 mg/dL  6-29 days.................<15.0 mg/dL         BUN       36       Calcium       9.1       Chloride       111       CO2       24       Color, UA     Yellow         CPK       206       Creatinine       1.1       Differential Method       Automated       eGFR if        51       eGFR if non        44  Comment: Calculation used to obtain the estimated glomerular filtration  rate (eGFR) is the CKD-EPI equation.          Eos #       0.1       Eosinophil %       0.5       Glucose       257       Glucose, UA     Negative         Gran # (ANC)       11.5       Gran %       89.9       Hematocrit       27.7       Hemoglobin       8.8       Hyaline Casts, UA     6         Immature Grans (Abs)       0.10  Comment: Mild elevation in immature granulocytes is non specific and   can be seen in a variety of conditions including stress response,   acute inflammation, trauma and pregnancy. Correlation with other   laboratory and clinical findings is essential.         Immature Granulocytes       0.8       Ketones, UA     Negative         Leukocytes, UA     Trace         Lymph #       0.6       Lymph %       4.8       MCH       33.7       MCHC       31.8       MCV       106       Microscopic Comment     SEE COMMENT  Comment: Other formed elements not mentioned in the report are not   present in the microscopic examination.            Mono #       0.5       Mono %       3.9       MPV       10.8       NITRITE UA     Positive         nRBC       0       Occult Blood UA     Negative         pH, UA     6.0         Platelets       176       POCT Glucose 193             Potassium       4.3       PROTEIN TOTAL       6.5       Protein, UA     Negative  Comment: Recommend a 24 hour urine protein or a urine   protein/creatinine  ratio if globulin induced proteinuria is  clinically suspected.           RBC       2.61       RBC, UA     1         RDW       14.0       SARS-CoV-2 RNA, Amplification, Qual   Negative  Comment: This test utilizes isothermal nucleic acid amplification   technology to detect the SARS-CoV-2 RdRp nucleic acid segment.   The analytical sensitivity (limit of detection) is 125 genome   equivalents/mL.     A POSITIVE result implies infection with the SARS-CoV-2 virus;  the patient is presumed to be contagious.    A NEGATIVE result means that SARS-CoV-2 nucleic acids are not  present above the limit of detection. A NEGATIVE result should be   treated as presumptive. It does not rule out the possibility of   COVID-19 and should not be the sole basis for treatment decisions.   If COVID-19 is strongly suspected based on clinical and exposure   history, re-testing using an alternate molecular assay should be   considered.       This test is only for use under the Food and Drug   Administration s Emergency Use Authorization (EUA).   Commercial kits are provided by [employer].   Performance characteristics of the EUA have been independently  verified by [facility] Department of  Pathology and Laboratory Medicine.   _________________________________________________________________  The [employer] NOW COVID-19 Letter of Authorization, along with the   authorized Fact Sheet for Healthcare Providers, the authorized Fact  Sheet for Patients, and authorized labeling are available on the FDA   website:  www.fda.gov/MedicalDevices/Safety/EmergencySituations/dok045062.htm             Sodium       144       Specific [place], UA     1.010         Specimen UA     Urine, Clean Catch         UROBILINOGEN UA     Negative         WBC Clumps, UA     Rare         WBC, UA     4         WBC       12.83             All pertinent labs within the past 24 hours have been reviewed.    Significant Imaging: X-Ray: I have reviewed all pertinent  results/findings and my personal findings are:  Patient has a spiral fracture extending from the distal 3rd her to femoral component total hip arthroplasty extending through to the diaphyseal portion of the femur.  Patient's prosthesis appears well fixed.  Patient does have some joint space narrowing in the joint consistent with degenerative osteoarthritis.  Diffuse osteopenia is noted throughout.

## 2022-07-19 NOTE — H&P
[facility] Dept[facility]  [facility] Medicine  History & Physical    Patient Name: Rosy Balderrama  MRN: [id number]  Patient Class: IP- Inpatient  Admission Date: 7/19/2022  Attending Physician: Boyd Fields MD  Primary Care Provider: Maude Barrios MD         Patient information was obtained from relative(s) and ER records.     Subjective:     Principal Problem:Closed left femoral fracture    Chief Complaint:   Chief Complaint   Patient presents with    Fall     Fell today while walking to bathroom w/o walker. L leg pain, can't stand.        HPI: Patient is a 89-year-old  female with past medical history of dementia, AFib, diabetes, hyperlipidemia who presents to the ED today after suffering a fall this morning.  History obtained via son due to patient's history of dementia.  When questioned on what happened, patient stated I am not sure ask my children.  Patient suffered a fall after going to the bathroom this a.m..  She was then taken to the ED.    In the ED, x-rays reveal spiral fracture left proximal femur with significant displacement.  She was given 1 dose of Rocephin for UTI.  Ortho notified. Hospital Medicine consulted and patient admitted.       Past Medical History:   Diagnosis Date    *Atrial fibrillation     Allergy     Blood transfusion     Cataract     Diabetes mellitus type II     Hyperlipidemia     Osteoporosis        Past Surgical History:   Procedure Laterality Date    ANKLE SURGERY      broken ankle    BILATERAL SALPINGOOPHORECTOMY      BREAST BIOPSY      CARPAL TUNNEL RELEASE      CHOLECYSTECTOMY      COLONOSCOPY      ERCP      ESOPHAGOGASTRODUODENOSCOPY      FRACTURE SURGERY      KNEE CARTILAGE SURGERY      TOTAL ABDOMINAL HYSTERECTOMY      TOTAL HIP ARTHROPLASTY      TUBAL LIGATION         Review of patient's allergies indicates:   Allergen Reactions    Alendronate Other (See Comments)    Atorvastatin Other (See Comments)    Ciprofloxacin Other (See Comments)     Codeine Itching    Diazepam Itching    Ibuprofen Other (See Comments)    Oxycodone-acetaminophen Other (See Comments) and Itching    Rofecoxib Other (See Comments)    Seldane     Simvastatin Other (See Comments)       No current facility-administered medications on file prior to encounter.     Current Outpatient Medications on File Prior to Encounter   Medication Sig    AMINO ACIDS/MINERALS (A/G PRO ORAL) Take 2 tablets by mouth 2 (two) times a day.    ANTIOX#10/OM3/DHA/EPA/LUT/ZEAX (I-CAPS ORAL) Take 1 capsule by mouth once daily.    aspirin 81 MG Chew Take 81 mg by mouth nightly.    bumetanide (BUMEX) 1 MG tablet Take 1 mg by mouth once daily.    CALCIUM CARBONATE/VITAMIN D3 (CALTRATE 600 + D ORAL) Take 1 tablet by mouth once daily.    cranberry fruit concentrate (AZO CRANBERRY ORAL) Take 1 tablet by mouth 2 (two) times a day.    fexofenadine (ALLEGRA) 180 MG tablet Take 180 mg by mouth once daily.    memantine (NAMENDA) 10 MG Tab Take 10 mg by mouth 2 (two) times daily.    metoprolol tartrate (LOPRESSOR) 25 MG tablet Take 12.5 mg by mouth 2 (two) times daily.    MULTIVITAMIN ORAL Take 1 tablet by mouth once daily.    naproxen sodium (ANAPROX) 220 MG tablet Take 220 mg by mouth 2 (two) times daily with meals.    omega-3 fatty acids/fish oil (FISH OIL-OMEGA-3 FATTY ACIDS) 300-1,000 mg capsule Take 1 capsule by mouth once daily.    pravastatin (PRAVACHOL) 40 MG tablet Take 40 mg by mouth once daily.    QUEtiapine (SEROQUEL) 25 MG Tab Take 25 mg by mouth nightly.    raloxifene (EVISTA) 60 mg tablet Take 60 mg by mouth once daily.    sitagliptan (JANUVIA) 100 MG Tab Take 100 mg by mouth once daily.    vitamin D (VITAMIN D3) 1000 units Tab Take 1,000 Units by mouth once daily.    [DISCONTINUED] acetaminophen (TYLENOL) 500 MG tablet Take by mouth. 1 Tablet Oral    [DISCONTINUED] ammonium lactate 12 % Crea Apply topically.    [DISCONTINUED] amoxicillin-clavulanate 500-125mg (AUGMENTIN) 500-125  mg Tab Take by mouth.    [DISCONTINUED] amoxicillin-clavulanate 875-125mg (AUGMENTIN) 875-125 mg per tablet 1 tablet    [DISCONTINUED] ASCORBATE CALCIUM (VITAMIN C ORAL) Take by mouth.    [DISCONTINUED] ascorbic acid, vitamin C, (VITAMIN C) 500 MG tablet Take 500 mg by mouth once daily.    [DISCONTINUED] buprenorphine (BUTRANS) 7.5 mcg/hour PTWK Apply 1 patch topically every 7 days.    [DISCONTINUED] buprenorphine 5 mcg/hour (BUTRANS) weekly patch 1 patch.    [DISCONTINUED] carboxymethylcellulose (REFRESH PLUS) 0.5 % Dpet 1 drop 3 (three) times daily as needed.    [DISCONTINUED] diclofenac (VOLTAREN) 50 MG EC tablet Take 50 mg by mouth 2 (two) times daily.    [DISCONTINUED] fluocinolone (DERMA-SMOOTHE) 0.01 % external oil Apply topically 3 (three) times daily.    [DISCONTINUED] fluticasone (FLONASE) 50 mcg/actuation nasal spray     [DISCONTINUED] folic acid-vit B6-vit B12 2.5-25-2 mg (FOLBIC OR EQUIV) 2.5-25-2 mg Tab Take 1 tablet by mouth once daily.    [DISCONTINUED] FREESTYLE LANCETS 28 gauge lancets     [DISCONTINUED] FREESTYLE LITE STRIPS Strp     [DISCONTINUED] METHYL SALICYLATE/MENTHOL (BANALG-HS LINIMENT TOP) Apply topically.    [DISCONTINUED] metoprolol succinate (TOPROL-XL) 25 MG 24 hr tablet Take 25 mg by mouth once daily.    [DISCONTINUED] msm-aloe vera-herbal66-emu oil Gel Apply topically.    [DISCONTINUED] multivitamin with folic acid 400 mcg Tab Take 1 tablet by mouth.    [DISCONTINUED] mupirocin (BACTROBAN) 2 % ointment 2 (two) times daily. Apply to affected area    [DISCONTINUED] oxybutynin (DITROPAN) 5 MG Tab     [DISCONTINUED] peg 400-propylene glycol, PF, (SYSTANE ULTRA/LUBRICANT EYE, PF,) 0.4-0.3 % Dpet Apply to eye.    [DISCONTINUED] polyethylene glycol (GLYCOLAX) 17 gram PwPk Take by mouth.    [DISCONTINUED] PROPYLENE GLYCOL/ (SYSTANE OPHT) Apply to eye.    [DISCONTINUED] sodium chloride 0.9 % SprA by Nasal route.    [DISCONTINUED] SSD 1 % cream      [DISCONTINUED] tramadol (ULTRAM) 50 mg tablet     [DISCONTINUED] triamcinolone acetonide 0.1% (KENALOG) 0.1 % cream Apply topically 2 (two) times daily.     Family History       Problem Relation (Age of Onset)    Cancer Mother, Sister    Diabetes Brother          Tobacco Use    Smoking status: Never Smoker    Smokeless tobacco: Never Used   Substance and Sexual Activity    Alcohol use: No    Drug use: No    Sexual activity: Not on file     Review of Systems   Constitutional:  Negative for fatigue and fever.   HENT:  Negative for sinus pressure.    Eyes:  Negative for visual disturbance.   Respiratory:  Negative for shortness of breath.    Cardiovascular:  Negative for chest pain.   Gastrointestinal:  Negative for nausea and vomiting.   Genitourinary:  Negative for difficulty urinating.   Musculoskeletal:  Positive for arthralgias (left hip pain). Negative for back pain.   Skin:  Negative for rash.   Neurological:  Negative for headaches.   Psychiatric/Behavioral:  Negative for confusion.    Objective:     Vital Signs (Most Recent):  Temp: 98.3 °F (36.8 °C) (07/19/22 1300)  Pulse: 66 (07/19/22 1300)  Resp: 18 (07/19/22 1134)  BP: 137/71 (07/19/22 1300)  SpO2: 99 % (07/19/22 1300) Vital Signs (24h Range):  Temp:  [98.3 °F (36.8 °C)-98.7 °F (37.1 °C)] 98.3 °F (36.8 °C)  Pulse:  [54-66] 66  Resp:  [16-18] 18  SpO2:  [98 %-99 %] 99 %  BP: (125-137)/(69-71) 137/71     Weight: 72 kg (158 lb 12.8 oz)  Body mass index is 28.13 kg/m².    Physical Exam  Constitutional:       General: She is not in acute distress.     Appearance: She is well-developed. She is not diaphoretic.   HENT:      Head: Normocephalic and atraumatic.   Eyes:      Pupils: Pupils are equal, round, and reactive to light.   Cardiovascular:      Rate and Rhythm: Normal rate and regular rhythm.      Heart sounds: Normal heart sounds. No murmur heard.    No friction rub. No gallop.   Pulmonary:      Effort: Pulmonary effort is normal. No respiratory  distress.      Breath sounds: Normal breath sounds. No stridor. No wheezing or rales.   Abdominal:      General: Bowel sounds are normal. There is no distension.      Palpations: Abdomen is soft. There is no mass.      Tenderness: There is no abdominal tenderness. There is no guarding.   Musculoskeletal:      Comments: Left lower extremity shortened and externally rotated   Skin:     General: Skin is warm.      Findings: No erythema.   Neurological:      Mental Status: She is alert. Mental status is at baseline.         CRANIAL NERVES     CN III, IV, VI   Pupils are equal, round, and reactive to light.     Significant Labs:   Results for orders placed or performed during the hospital encounter of 07/19/22   CBC auto differential   Result Value Ref Range    WBC 12.83 (H) 3.90 - 12.70 K/uL    RBC 2.61 (L) 4.00 - 5.40 M/uL    Hemoglobin 8.8 (L) 12.0 - 16.0 g/dL    Hematocrit 27.7 (L) 37.0 - 48.5 %     (H) 82 - 98 fL    MCH 33.7 (H) 27.0 - 31.0 pg    MCHC 31.8 (L) 32.0 - 36.0 g/dL    RDW 14.0 11.5 - 14.5 %    Platelets 176 150 - 450 K/uL    MPV 10.8 9.2 - 12.9 fL    Immature Granulocytes 0.8 (H) 0.0 - 0.5 %    Gran # (ANC) 11.5 (H) 1.8 - 7.7 K/uL    Immature Grans (Abs) 0.10 (H) 0.00 - 0.04 K/uL    Lymph # 0.6 (L) 1.0 - 4.8 K/uL    Mono # 0.5 0.3 - 1.0 K/uL    Eos # 0.1 0.0 - 0.5 K/uL    Baso # 0.01 0.00 - 0.20 K/uL    nRBC 0 0 /100 WBC    Gran % 89.9 (H) 38.0 - 73.0 %    Lymph % 4.8 (L) 18.0 - 48.0 %    Mono % 3.9 (L) 4.0 - 15.0 %    Eosinophil % 0.5 0.0 - 8.0 %    Basophil % 0.1 0.0 - 1.9 %    Differential Method Automated    Comprehensive metabolic panel   Result Value Ref Range    Sodium 144 136 - 145 mmol/L    Potassium 4.3 3.5 - 5.1 mmol/L    Chloride 111 (H) 95 - 110 mmol/L    CO2 24 23 - 29 mmol/L    Glucose 257 (H) 70 - 110 mg/dL    BUN 36 (H) 8 - 23 mg/dL    Creatinine 1.1 0.5 - 1.4 mg/dL    Calcium 9.1 8.7 - 10.5 mg/dL    Total Protein 6.5 6.0 - 8.4 g/dL    Albumin 3.7 3.5 - 5.2 g/dL    Total Bilirubin  0.5 0.1 - 1.0 mg/dL    Alkaline Phosphatase 70 55 - 135 U/L    AST 24 10 - 40 U/L    ALT 19 10 - 44 U/L    Anion Gap 9 8 - 16 mmol/L    eGFR if African American 51 (A) >60 mL/min/1.73 m^2    eGFR if non African American 44 (A) >60 mL/min/1.73 m^2   CPK   Result Value Ref Range     (H) 20 - 180 U/L   Urinalysis, Reflex to Urine Culture Urine, Catheterized    Specimen: Urine   Result Value Ref Range    Specimen UA Urine, Clean Catch     Color, UA Yellow Yellow, Straw, Lia    Appearance, UA Hazy (A) Clear    pH, UA 6.0 5.0 - 8.0    Specific Gravity, UA 1.010 1.005 - 1.030    Protein, UA Negative Negative    Glucose, UA Negative Negative    Ketones, UA Negative Negative    Bilirubin (UA) Negative Negative    Occult Blood UA Negative Negative    Nitrite, UA Positive (A) Negative    Urobilinogen, UA Negative <2.0 EU/dL    Leukocytes, UA Trace (A) Negative   COVID-19 Rapid Screening   Result Value Ref Range    SARS-CoV-2 RNA, Amplification, Qual Negative Negative   Urinalysis Microscopic   Result Value Ref Range    RBC, UA 1 0 - 4 /hpf    WBC, UA 4 0 - 5 /hpf    WBC Clumps, UA Rare None-Rare    Bacteria Occasional None-Occ /hpf    Hyaline Casts, UA 6 (A) 0-1/lpf /lpf    Microscopic Comment SEE COMMENT    POCT glucose   Result Value Ref Range    POCT Glucose 193 (H) 70 - 110 mg/dL        Significant Imaging: X-Ray Chest AP Portable  Narrative: EXAMINATION:  XR CHEST AP PORTABLE    CLINICAL HISTORY:  Unspecified fall, initial encounter    FINDINGS:  Single view of the chest.  No comparison.    Cardiac silhouette is normal.  The lungs demonstrate no evidence of active disease.  No evidence of pleural effusion or pneumothorax.  Bones appear intact with diffuse osteopenia and moderate degenerative changes.  Aorta demonstrates atherosclerotic disease.  Impression: No acute process seen.    Electronically signed by: Artie Starkey MD  Date:    07/19/2022  Time:    11:43  X-Ray Femur Ap/Lat Left  Narrative:  EXAMINATION:  XR FEMUR 2 VIEW LEFT    CLINICAL HISTORY:  XR FEMUR 2 VIEW LEFTUnspecified injury of left lower leg, initial encounter    COMPARISON:  None    FINDINGS:  Four views of the left femur were obtained.    Spiral fracture of the proximal femoral diaphysis just below hip prosthesis with moderate to severe displacement of a proximally 3-4 cm.  No evidence of hip dislocation.  Diffuse osteopenia .  Diffuse soft tissue edema.  Vascular calcifications are noted.  Moderate degenerative changes of the knee joint.  Impression: See above    Electronically signed by: Artie Starkey MD  Date:    07/19/2022  Time:    11:42  X-Ray Hip 2 or 3 views Left (with Pelvis when performed)  Narrative: EXAMINATION:  XR HIP WITH PELVIS WHEN PERFORMED, 2 OR 3 VIEWS LEFT    CLINICAL HISTORY:  XR HIP WITH PELVIS WHEN PERFORMED, 2 OR 3 VIEWS LEFT    COMPARISON:  None    FINDINGS:  Three views of the left hip were obtained.    Partially visualized spiral fracture of the left femoral diaphysis.  Left hip replacement without evidence of dislocation.  Intramedullary lianne and single pen through right-sided intra trochanteric fracture which has a chronic appearance.  Evaluation is limited.  Chronic fracture deformity within the visualized portion of the proximal right femoral diaphysis.  Diffuse osteopenia.  Dense vascular calcifications.  Impression: Partially visualized spiral fracture of the left femoral diaphysis.  Left hip replacement without evidence of dislocation.    Electronically signed by: Artie Starkey MD  Date:    07/19/2022  Time:    11:05  X-Ray Lumbar Spine Ap And Lateral  Narrative: EXAMINATION:  XR LUMBAR SPINE AP AND LATERAL    CLINICAL HISTORY:  fall;    COMPARISON:  None    FINDINGS:  3 views of the lumbar spine.    Scoliotic curvature throughout the lumbar spine.  Superior endplate degenerative changes and mild wedging within multiple levels of the lumbar spine thought to be chronic.  Advanced degenerative changes with  spondylosis, facet arthropathy and multilevel disc space narrowing.  No evidence of spondylolysis or spondylolisthesis. Vertebral body heights are normal.    Suspect cholecystectomy clips.  Moderate constipation.  Aorta demonstrates atherosclerotic disease.  Impression: See above.  MRI can be obtained as clinically warranted if there is concern for occult fracture.    Electronically signed by: Artie Starkey MD  Date:    07/19/2022  Time:    11:04      Assessment/Plan:     * Closed left femoral fracture  Displaced proximal femoral fracture  Norco, morphine p.r.n. for pain  Ortho consult on case  NPO at midnight in the event surgery can be performed tomorrow    Advance Care Planning     Discussed case with son Arben who is power of .  States that he does not know his mother's wishes regarding code status.  Will continue full code for now.         Osteoporosis  Resume home meds      Paroxysmal atrial fibrillation  Continue home metoprolol  Patient not on anticoagulation  No history of bleeding reported  Demar Vasc score 4  Will likely need short term OAC VTE prophylaxis from Orthopedic surgery  Will defer long term anticoagulation to PCP      Hyperlipidemia  Resume statin    Diabetes mellitus type 2, controlled, without complications  Diabetic diet  Sliding scale  Hold home diabetic meds        VTE Risk Mitigation (From admission, onward)         Ordered     enoxaparin injection 40 mg  Daily         07/19/22 8032                   Boyd Fields MD  Department of Hospital Medicine   [facility] - Emergency Dept.

## 2022-07-19 NOTE — ASSESSMENT & PLAN NOTE
Vitamin-D and parathyroid hormone levels have been ordered.  Patient is is currently on vitamin-D supplementation at 1000 units daily.

## 2022-07-19 NOTE — Clinical Note
Diagnosis: Closed displaced spiral fracture of shaft of left femur, initial encounter [[id number]]   Future Attending Provider: CHI TYLER [[id number]]   Admitting Provider:: CHI TYLER [[id number]]

## 2022-07-19 NOTE — ASSESSMENT & PLAN NOTE
Displaced proximal femoral fracture  Norco, morphine p.r.n. for pain  Ortho consult on case  NPO at midnight in the event surgery can be performed tomorrow    Advance Care Planning     Discussed case with son Arben who is power of .  States that he does not know his mother's wishes regarding code status.  Will continue full code for now.

## 2022-07-19 NOTE — CONSULTS
[facility] ([facility])  Orthopedics  Consult Note    Patient Name: Rosy Balderrama  MRN: [id number]  Admission Date: 7/19/2022  Hospital Length of Stay: 0 days  Attending Provider: Boyd Fields MD  Primary Care Provider: Maude Barrios MD    Patient information was obtained from patient, relative(s), past medical records and ER records.     Consults  Subjective:     Principal Problem:Periprosthetic fracture of femur following total replacement of hip    Chief Complaint:   Chief Complaint   Patient presents with    Fall     Fell today while walking to bathroom w/o walker. L leg pain, can't stand.        HPI: Patient is a very pleasant 89-year-old  female with a history of dementia who sustained a ground level fall onto her left side today.  She had immediate pain obvious deformity of the limb and inability to ambulate.  Patient lives with her son who is her primary caregiver.  She denies any additional injuries.  Patient's son was at bedside throughout my evaluation.  Patient was transferred to the emergency department where she was evaluated diagnosed with a left femur fracture.  Of note patient is status post a left total hip arthroplasty performed many years ago.  Patient does have a leg-length discrepancy for since that surgery in the left leg is longer than the right.  She does wear shoe inserts in the right shoe to correct this.      Past Medical History:   Diagnosis Date    *Atrial fibrillation     Allergy     Blood transfusion     Cataract     Diabetes mellitus type II     Hyperlipidemia     Osteoporosis        Past Surgical History:   Procedure Laterality Date    ANKLE SURGERY      broken ankle    BILATERAL SALPINGOOPHORECTOMY      BREAST BIOPSY      CARPAL TUNNEL RELEASE      CHOLECYSTECTOMY      COLONOSCOPY      ERCP      ESOPHAGOGASTRODUODENOSCOPY      FRACTURE SURGERY      KNEE CARTILAGE SURGERY      TOTAL ABDOMINAL HYSTERECTOMY      TOTAL HIP ARTHROPLASTY      TUBAL LIGATION          Review of patient's allergies indicates:   Allergen Reactions    Alendronate Other (See Comments)    Atorvastatin Other (See Comments)    Ciprofloxacin Other (See Comments)    Codeine Itching    Diazepam Itching    Ibuprofen Other (See Comments)    Oxycodone-acetaminophen Other (See Comments) and Itching    Rofecoxib Other (See Comments)    Seldane     Simvastatin Other (See Comments)       Current Facility-Administered Medications   Medication    [START ON 7/20/2022] 0.9%  NaCl infusion    acetaminophen tablet 650 mg    [START ON 7/20/2022] bumetanide tablet 1 mg    dextrose 10% bolus 125 mL    dextrose 10% bolus 250 mL    glucagon (human recombinant) injection 1 mg    HYDROcodone-acetaminophen 7.5-325 mg per tablet 1 tablet    insulin aspart U-100 pen 0-5 Units    memantine tablet 10 mg    [START ON 7/20/2022] metoprolol succinate (TOPROL-XL) 24 hr tablet 25 mg    morphine injection 2 mg    ondansetron disintegrating tablet 4 mg    [START ON 7/20/2022] pravastatin tablet 40 mg    QUEtiapine tablet 25 mg    raloxifene tablet 60 mg    vitamin D 1000 units tablet 1,000 Units     Current Outpatient Medications   Medication Sig    AMINO ACIDS/MINERALS (A/G PRO ORAL) Take 2 tablets by mouth 2 (two) times a day.    ANTIOX#10/OM3/DHA/EPA/LUT/ZEAX (I-CAPS ORAL) Take 1 capsule by mouth once daily.    aspirin 81 MG Chew Take 81 mg by mouth nightly.    bumetanide (BUMEX) 1 MG tablet Take 1 mg by mouth once daily.    CALCIUM CARBONATE/VITAMIN D3 (CALTRATE 600 + D ORAL) Take 1 tablet by mouth once daily.    cranberry fruit concentrate (AZO CRANBERRY ORAL) Take 1 tablet by mouth 2 (two) times a day.    fexofenadine (ALLEGRA) 180 MG tablet Take 180 mg by mouth once daily.    memantine (NAMENDA) 10 MG Tab Take 10 mg by mouth 2 (two) times daily.    metoprolol tartrate (LOPRESSOR) 25 MG tablet Take 12.5 mg by mouth 2 (two) times daily.    MULTIVITAMIN ORAL Take 1 tablet by mouth once  daily.    naproxen sodium (ANAPROX) 220 MG tablet Take 220 mg by mouth 2 (two) times daily with meals.    omega-3 fatty acids/fish oil (FISH OIL-OMEGA-3 FATTY ACIDS) 300-1,000 mg capsule Take 1 capsule by mouth once daily.    pravastatin (PRAVACHOL) 40 MG tablet Take 40 mg by mouth once daily.    QUEtiapine (SEROQUEL) 25 MG Tab Take 25 mg by mouth nightly.    raloxifene (EVISTA) 60 mg tablet Take 60 mg by mouth once daily.    sitagliptan (JANUVIA) 100 MG Tab Take 100 mg by mouth once daily.    vitamin D (VITAMIN D3) 1000 units Tab Take 1,000 Units by mouth once daily.     Family History       Problem Relation (Age of Onset)    Cancer Mother, Sister    Diabetes Brother          Tobacco Use    Smoking status: Never Smoker    Smokeless tobacco: Never Used   Substance and Sexual Activity    Alcohol use: No    Drug use: No    Sexual activity: Not on file     Review of Systems   Constitutional: Negative for decreased appetite.   Cardiovascular:  Negative for chest pain.   Respiratory:  Negative for cough.    Hematologic/Lymphatic: Does not bruise/bleed easily.   Musculoskeletal:  Positive for falls, joint pain and myalgias.   Gastrointestinal:  Negative for abdominal pain, nausea and vomiting.   Neurological:  Negative for weakness.   Psychiatric/Behavioral:  Positive for memory loss.    All other systems reviewed and are negative.  Objective:     Vital Signs (Most Recent):  Temp: 98.3 °F (36.8 °C) (07/19/22 1300)  Pulse: 66 (07/19/22 1300)  Resp: 18 (07/19/22 1134)  BP: 137/71 (07/19/22 1300)  SpO2: 99 % (07/19/22 1300) Vital Signs (24h Range):  Temp:  [98.3 °F (36.8 °C)-98.7 °F (37.1 °C)] 98.3 °F (36.8 °C)  Pulse:  [54-66] 66  Resp:  [16-18] 18  SpO2:  [98 %-99 %] 99 %  BP: (125-137)/(69-71) 137/71     Weight: 72 kg (158 lb 12.8 oz)     Body mass index is 28.13 kg/m².      Intake/Output Summary (Last 24 hours) at 7/19/2022 1541  Last data filed at 7/19/2022 1317  Gross per 24 hour   Intake 50 ml   Output --    Net 50 ml       General    Nursing note and vitals reviewed.  Constitutional: She is oriented to person, place, and time. She appears well-developed and well-nourished. No distress.   HENT:   Head: Normocephalic and atraumatic.   Eyes: EOM are normal. No scleral icterus.   Cardiovascular:  Intact distal pulses.            Pulmonary/Chest: Effort normal. No respiratory distress.   Abdominal: She exhibits no distension.   Neurological: She is alert and oriented to person, place, and time.   Psychiatric: She has a normal mood and affect. Her behavior is normal. Judgment and thought content normal.         Left Hip Exam     Comments:  LLE: skin is warm dry and intact. Obvious deformity noted in thigh.  Compartments are soft, sensation is grossly intact to light touch. Brisk cap refill to toes. Well healed prior wound over lateral knee.          Significant Labs:   Recent Lab Results         07/19/22  1332   07/19/22  1141   07/19/22  1137   07/19/22  1132        Albumin       3.7       Alkaline Phosphatase       70       ALT       19       Anion Gap       9       Appearance, UA     Hazy         AST       24       Bacteria, UA     Occasional         Baso #       0.01       Basophil %       0.1       Bilirubin (UA)     Negative         BILIRUBIN TOTAL       0.5  Comment: For infants and newborns, interpretation of results should be based  on gestational age, weight and in agreement with clinical  observations.    Premature Infant recommended reference ranges:  Up to 24 hours.............<8.0 mg/dL  Up to 48 hours............<12.0 mg/dL  3-5 days..................<15.0 mg/dL  6-29 days.................<15.0 mg/dL         BUN       36       Calcium       9.1       Chloride       111       CO2       24       Color, UA     Yellow         CPK       206       Creatinine       1.1       Differential Method       Automated       eGFR if        51       eGFR if non        44  Comment: Calculation  used to obtain the estimated glomerular filtration  rate (eGFR) is the CKD-EPI equation.          Eos #       0.1       Eosinophil %       0.5       Glucose       257       Glucose, UA     Negative         Gran # (ANC)       11.5       Gran %       89.9       Hematocrit       27.7       Hemoglobin       8.8       Hyaline Casts, UA     6         Immature Grans (Abs)       0.10  Comment: Mild elevation in immature granulocytes is non specific and   can be seen in a variety of conditions including stress response,   acute inflammation, trauma and pregnancy. Correlation with other   laboratory and clinical findings is essential.         Immature Granulocytes       0.8       Ketones, UA     Negative         Leukocytes, UA     Trace         Lymph #       0.6       Lymph %       4.8       MCH       33.7       MCHC       31.8       MCV       106       Microscopic Comment     SEE COMMENT  Comment: Other formed elements not mentioned in the report are not   present in the microscopic examination.            Mono #       0.5       Mono %       3.9       MPV       10.8       NITRITE UA     Positive         nRBC       0       Occult Blood UA     Negative         pH, UA     6.0         Platelets       176       POCT Glucose 193             Potassium       4.3       PROTEIN TOTAL       6.5       Protein, UA     Negative  Comment: Recommend a 24 hour urine protein or a urine   protein/creatinine ratio if globulin induced proteinuria is  clinically suspected.           RBC       2.61       RBC, UA     1         RDW       14.0       SARS-CoV-2 RNA, Amplification, Qual   Negative  Comment: This test utilizes isothermal nucleic acid amplification   technology to detect the SARS-CoV-2 RdRp nucleic acid segment.   The analytical sensitivity (limit of detection) is 125 genome   equivalents/mL.     A POSITIVE result implies infection with the SARS-CoV-2 virus;  the patient is presumed to be contagious.    A NEGATIVE result means that  SARS-CoV-2 nucleic acids are not  present above the limit of detection. A NEGATIVE result should be   treated as presumptive. It does not rule out the possibility of   COVID-19 and should not be the sole basis for treatment decisions.   If COVID-19 is strongly suspected based on clinical and exposure   history, re-testing using an alternate molecular assay should be   considered.       This test is only for use under the Food and Drug   Administration s Emergency Use Authorization (EUA).   Commercial kits are provided by [employer].   Performance characteristics of the EUA have been independently  verified by [facility] Department of  Pathology and Laboratory Medicine.   _________________________________________________________________  The ID NOW COVID-19 Letter of Authorization, along with the   authorized Fact Sheet for Healthcare Providers, the authorized Fact  Sheet for Patients, and authorized labeling are available on the FDA   website:  www.fda.gov/MedicalDevices/Safety/EmergencySituations/lac988339.htm             Sodium       144       Specific [place], UA     1.010         Specimen UA     Urine, Clean Catch         UROBILINOGEN UA     Negative         WBC Clumps, UA     Rare         WBC, UA     4         WBC       12.83             All pertinent labs within the past 24 hours have been reviewed.    Significant Imaging: X-Ray: I have reviewed all pertinent results/findings and my personal findings are:  Patient has a spiral fracture extending from the distal 3rd her to femoral component total hip arthroplasty extending through to the diaphyseal portion of the femur.  Patient's prosthesis appears well fixed.  Patient does have some joint space narrowing in the joint consistent with degenerative osteoarthritis.  Diffuse osteopenia is noted throughout.    Assessment/Plan:     * Periprosthetic fracture of femur following total replacement of hip  I discussed with the patient and her son her  injury reviewed her x-rays with them.  I recommend proceeding with open reduction internal fixation of her periprosthetic [place] B1 left femur fracture.  Patient's son understands inherent risks and benefits of the procedure and elected to proceed.  I have discussed her case with our colleagues in Anesthesia.  Patient has also been evaluated by Medicine.  We will plan to proceed with surgical intervention this evening.  Patient has been NPO since 8:30a this morning.  We will also order a vitamin-D level as well as the parathyroid hormone intact level as workup for her metabolic bone disease.    Osteoporosis  Vitamin-D and parathyroid hormone levels have been ordered.  Patient is is currently on vitamin-D supplementation at 1000 units daily.    Diabetes mellitus type 2, controlled, without complications  Patient has a pending hemoglobin A1c.  Family understands that perioperative glucose control as she had to with wound healing and bony healing.        Thank you for your consult. I will follow-up with patient. Please contact us if you have any additional questions.    Yulissa Marx MD  Orthopedics  [facility] ([facility])

## 2022-07-19 NOTE — HPI
Patient is a 89-year-old  female with past medical history of dementia, AFib, diabetes, hyperlipidemia who presents to the ED today after suffering a fall this morning.  History obtained via son due to patient's history of dementia.  When questioned on what happened, patient stated I am not sure ask my children.  Patient suffered a fall after going to the bathroom this a.m..  She was then taken to the ED.    In the ED, x-rays reveal spiral fracture left proximal femur with significant displacement.  She was given 1 dose of Rocephin for UTI.  Ortho notified. Hospital Medicine consulted and patient admitted.

## 2022-07-19 NOTE — HPI
Patient is a very pleasant 89-year-old  female with a history of dementia who sustained a ground level fall onto her left side today.  She had immediate pain obvious deformity of the limb and inability to ambulate.  Patient lives with her son who is her primary caregiver.  She denies any additional injuries.  Patient's son was at bedside throughout my evaluation.  Patient was transferred to the emergency department where she was evaluated diagnosed with a left femur fracture.  Of note patient is status post a left total hip arthroplasty performed many years ago.  Patient does have a leg-length discrepancy for since that surgery in the left leg is longer than the right.  She does wear shoe inserts in the right shoe to correct this.

## 2022-07-19 NOTE — FIRST PROVIDER EVALUATION
Medical screening exam completed.  I have conducted a focused provider triage encounter, findings are as follows:    Brief history of present illness:89 year old female with complaint of left hip pain and lower back pain after fall this morning.  Reports lost balance while walking this morning. NO head or neck pain.      Vitals:    07/19/22 1014   BP: 125/69   BP Location: Left arm   Patient Position: Sitting   Pulse: (!) 54   Resp: 16   Temp: 98.7 °F (37.1 °C)   TempSrc: Oral   SpO2: 98%       Pertinent physical exam: lower lumbar tenderness and left hip pain with ROM of hip

## 2022-07-19 NOTE — PHARMACY MED REC
"Admission Medication History     The home medication history was taken by Wilton Zabala.    You may go to "Admission" then "Reconcile Home Medications" tabs to review and/or act upon these items.      The home medication list has been updated by the Pharmacy department.    Please read ALL comments highlighted in yellow.    Please address this information as you see fit.     Feel free to contact us if you have any questions or require assistance.      The medications listed below were removed from the home medication list. Please reorder if appropriate:  Patient reports no longer taking the following medication(s):   ACETAMINOPHEN 500 MG TABLET   AMMONIUM LACTATE 12 % TOPICAL CREAM   AMOXICILLIN-CLAVULANATE 500-125 MG PER TABLET   AMOXICILLIN-CLAVULANATE 875-125 MG PER TABLET   BUPRENORPHINE 5 MCG/HR WEEKLY TOPICAL PATCH   BUPRENORPHINE 7.5 MCG/HR WEEKLY TOPICAL PATCH   CARBOXYMETHYLCELLULOSE 0.5 % OPHTHALMIC DROP   DICLOFENAC 50 MG EC TABLET   FLUOCINOLONE 0.01 % EXTERNAL OIL   FLUTICASONE PROPIONATE 50 MCG/ACTUATION NASAL SPRAY   METOPROLOL SUCCINATE 25 MG 24 HR TABLET (TAKES METOPROLOL TARTRATE)   MUPIROCIN 2 % TOPICAL OINTMENT   OXYBUTYNIN 5 MG TABLET   POLYETHYLENE GLYCOL 17 GRAM/DOSE POWDER   SSD 1 % TOPICAL CREAM   TRAMADOL 50 MG TABLET   TRIAMCINOLONE ACETONIDE 0.1 % TOPICAL CREAM    Medications listed below were obtained from: Patient/family, Analytic software- [employer] and Patient's pharmacy  (Not in a hospital admission)      Potential issues to be addressed PRIOR TO DISCHARGE: NONE      Wilton Zabala Otf  Pharmacy Technician Specialist-Medication History  [facility] [phone]  Secure chat preferred       Current Outpatient Medications on File Prior to Encounter   Medication Sig Dispense Refill Last Dose    AMINO ACIDS/MINERALS (A/G PRO ORAL) Take 2 tablets by mouth 2 (two) times a day.   7/19/2022 at Unknown time    ANTIOX#10/OM3/DHA/EPA/LUT/ZEAX (I-CAPS ORAL) Take 1 capsule by " mouth once daily.   7/19/2022 at Unknown time    aspirin 81 MG Chew Take 81 mg by mouth nightly.   7/18/2022 at Unknown time    bumetanide (BUMEX) 1 MG tablet Take 1 mg by mouth once daily.   7/19/2022 at Unknown time    CALCIUM CARBONATE/VITAMIN D3 (CALTRATE 600 + D ORAL) Take 1 tablet by mouth once daily.   7/18/2022 at Unknown time    cranberry fruit concentrate (AZO CRANBERRY ORAL) Take 1 tablet by mouth 2 (two) times a day.   7/19/2022 at Unknown time    fexofenadine (ALLEGRA) 180 MG tablet Take 180 mg by mouth once daily.   7/19/2022 at Unknown time    memantine (NAMENDA) 10 MG Tab Take 10 mg by mouth 2 (two) times daily.   7/19/2022 at Unknown time    metoprolol tartrate (LOPRESSOR) 25 MG tablet Take 12.5 mg by mouth 2 (two) times daily.   7/19/2022 at Unknown time    MULTIVITAMIN ORAL Take 1 tablet by mouth once daily.   7/18/2022 at Unknown time    naproxen sodium (ANAPROX) 220 MG tablet Take 220 mg by mouth 2 (two) times daily with meals.   7/19/2022 at Unknown time    omega-3 fatty acids/fish oil (FISH OIL-OMEGA-3 FATTY ACIDS) 300-1,000 mg capsule Take 1 capsule by mouth once daily.   7/18/2022 at Unknown time    pravastatin (PRAVACHOL) 40 MG tablet Take 40 mg by mouth once daily.   7/19/2022 at Unknown time    QUEtiapine (SEROQUEL) 25 MG Tab Take 25 mg by mouth nightly.   7/18/2022 at Unknown time    raloxifene (EVISTA) 60 mg tablet Take 60 mg by mouth once daily.   7/18/2022 at Unknown time    sitagliptan (JANUVIA) 100 MG Tab Take 100 mg by mouth once daily.   7/19/2022 at Unknown time    vitamin D (VITAMIN D3) 1000 units Tab Take 1,000 Units by mouth once daily.   7/18/2022 at Unknown time    [DISCONTINUED] acetaminophen (TYLENOL) 500 MG tablet Take by mouth. 1 Tablet Oral       [DISCONTINUED] ammonium lactate 12 % Crea Apply topically.       [DISCONTINUED] amoxicillin-clavulanate 500-125mg (AUGMENTIN) 500-125 mg Tab Take by mouth.       [DISCONTINUED] amoxicillin-clavulanate  875-125mg (AUGMENTIN) 875-125 mg per tablet 1 tablet       [DISCONTINUED] ASCORBATE CALCIUM (VITAMIN C ORAL) Take by mouth.       [DISCONTINUED] ascorbic acid, vitamin C, (VITAMIN C) 500 MG tablet Take 500 mg by mouth once daily.       [DISCONTINUED] buprenorphine (BUTRANS) 7.5 mcg/hour PTWK Apply 1 patch topically every 7 days.       [DISCONTINUED] buprenorphine 5 mcg/hour (BUTRANS) weekly patch 1 patch.       [DISCONTINUED] carboxymethylcellulose (REFRESH PLUS) 0.5 % Dpet 1 drop 3 (three) times daily as needed.       [DISCONTINUED] diclofenac (VOLTAREN) 50 MG EC tablet Take 50 mg by mouth 2 (two) times daily.       [DISCONTINUED] fluocinolone (DERMA-SMOOTHE) 0.01 % external oil Apply topically 3 (three) times daily.       [DISCONTINUED] fluticasone (FLONASE) 50 mcg/actuation nasal spray        [DISCONTINUED] folic acid-vit B6-vit B12 2.5-25-2 mg (FOLBIC OR EQUIV) 2.5-25-2 mg Tab Take 1 tablet by mouth once daily.       [DISCONTINUED] FREESTYLE LANCETS 28 gauge lancets        [DISCONTINUED] FREESTYLE LITE STRIPS Strp        [DISCONTINUED] METHYL SALICYLATE/MENTHOL (BANALG-HS LINIMENT TOP) Apply topically.       [DISCONTINUED] metoprolol succinate (TOPROL-XL) 25 MG 24 hr tablet Take 25 mg by mouth once daily.       [DISCONTINUED] msm-aloe vera-herbal66-emu oil Gel Apply topically.       [DISCONTINUED] multivitamin with folic acid 400 mcg Tab Take 1 tablet by mouth.       [DISCONTINUED] mupirocin (BACTROBAN) 2 % ointment 2 (two) times daily. Apply to affected area  0     [DISCONTINUED] oxybutynin (DITROPAN) 5 MG Tab        [DISCONTINUED] peg 400-propylene glycol, PF, (SYSTANE ULTRA/LUBRICANT EYE, PF,) 0.4-0.3 % Dpet Apply to eye.       [DISCONTINUED] polyethylene glycol (GLYCOLAX) 17 gram PwPk Take by mouth.       [DISCONTINUED] PROPYLENE GLYCOL/ (SYSTANE OPHT) Apply to eye.       [DISCONTINUED] sodium chloride 0.9 % SprA by Nasal route.       [DISCONTINUED] SSD 1 % cream         [DISCONTINUED] tramadol (ULTRAM) 50 mg tablet        [DISCONTINUED] triamcinolone acetonide 0.1% (KENALOG) 0.1 % cream Apply topically 2 (two) times daily.                                .

## 2022-07-19 NOTE — ASSESSMENT & PLAN NOTE
Patient has a pending hemoglobin A1c.  Family understands that perioperative glucose control as she had to with wound healing and bony healing.

## 2022-07-19 NOTE — SUBJECTIVE & OBJECTIVE
Past Medical History:   Diagnosis Date    *Atrial fibrillation     Allergy     Blood transfusion     Cataract     Diabetes mellitus type II     Hyperlipidemia     Osteoporosis        Past Surgical History:   Procedure Laterality Date    ANKLE SURGERY      broken ankle    BILATERAL SALPINGOOPHORECTOMY      BREAST BIOPSY      CARPAL TUNNEL RELEASE      CHOLECYSTECTOMY      COLONOSCOPY      ERCP      ESOPHAGOGASTRODUODENOSCOPY      FRACTURE SURGERY      KNEE CARTILAGE SURGERY      TOTAL ABDOMINAL HYSTERECTOMY      TOTAL HIP ARTHROPLASTY      TUBAL LIGATION         Review of patient's allergies indicates:   Allergen Reactions    Alendronate Other (See Comments)    Atorvastatin Other (See Comments)    Ciprofloxacin Other (See Comments)    Codeine Itching    Diazepam Itching    Ibuprofen Other (See Comments)    Oxycodone-acetaminophen Other (See Comments) and Itching    Rofecoxib Other (See Comments)    Seldane     Simvastatin Other (See Comments)       No current facility-administered medications on file prior to encounter.     Current Outpatient Medications on File Prior to Encounter   Medication Sig    AMINO ACIDS/MINERALS (A/G PRO ORAL) Take 2 tablets by mouth 2 (two) times a day.    ANTIOX#10/OM3/DHA/EPA/LUT/ZEAX (I-CAPS ORAL) Take 1 capsule by mouth once daily.    aspirin 81 MG Chew Take 81 mg by mouth nightly.    bumetanide (BUMEX) 1 MG tablet Take 1 mg by mouth once daily.    CALCIUM CARBONATE/VITAMIN D3 (CALTRATE 600 + D ORAL) Take 1 tablet by mouth once daily.    cranberry fruit concentrate (AZO CRANBERRY ORAL) Take 1 tablet by mouth 2 (two) times a day.    fexofenadine (ALLEGRA) 180 MG tablet Take 180 mg by mouth once daily.    memantine (NAMENDA) 10 MG Tab Take 10 mg by mouth 2 (two) times daily.    metoprolol tartrate (LOPRESSOR) 25 MG tablet Take 12.5 mg by mouth 2 (two) times daily.    MULTIVITAMIN ORAL Take 1 tablet by mouth once daily.    naproxen sodium (ANAPROX) 220 MG tablet Take 220 mg by mouth 2  (two) times daily with meals.    omega-3 fatty acids/fish oil (FISH OIL-OMEGA-3 FATTY ACIDS) 300-1,000 mg capsule Take 1 capsule by mouth once daily.    pravastatin (PRAVACHOL) 40 MG tablet Take 40 mg by mouth once daily.    QUEtiapine (SEROQUEL) 25 MG Tab Take 25 mg by mouth nightly.    raloxifene (EVISTA) 60 mg tablet Take 60 mg by mouth once daily.    sitagliptan (JANUVIA) 100 MG Tab Take 100 mg by mouth once daily.    vitamin D (VITAMIN D3) 1000 units Tab Take 1,000 Units by mouth once daily.    [DISCONTINUED] acetaminophen (TYLENOL) 500 MG tablet Take by mouth. 1 Tablet Oral    [DISCONTINUED] ammonium lactate 12 % Crea Apply topically.    [DISCONTINUED] amoxicillin-clavulanate 500-125mg (AUGMENTIN) 500-125 mg Tab Take by mouth.    [DISCONTINUED] amoxicillin-clavulanate 875-125mg (AUGMENTIN) 875-125 mg per tablet 1 tablet    [DISCONTINUED] ASCORBATE CALCIUM (VITAMIN C ORAL) Take by mouth.    [DISCONTINUED] ascorbic acid, vitamin C, (VITAMIN C) 500 MG tablet Take 500 mg by mouth once daily.    [DISCONTINUED] buprenorphine (BUTRANS) 7.5 mcg/hour PTWK Apply 1 patch topically every 7 days.    [DISCONTINUED] buprenorphine 5 mcg/hour (BUTRANS) weekly patch 1 patch.    [DISCONTINUED] carboxymethylcellulose (REFRESH PLUS) 0.5 % Dpet 1 drop 3 (three) times daily as needed.    [DISCONTINUED] diclofenac (VOLTAREN) 50 MG EC tablet Take 50 mg by mouth 2 (two) times daily.    [DISCONTINUED] fluocinolone (DERMA-SMOOTHE) 0.01 % external oil Apply topically 3 (three) times daily.    [DISCONTINUED] fluticasone (FLONASE) 50 mcg/actuation nasal spray     [DISCONTINUED] folic acid-vit B6-vit B12 2.5-25-2 mg (FOLBIC OR EQUIV) 2.5-25-2 mg Tab Take 1 tablet by mouth once daily.    [DISCONTINUED] FREESTYLE LANCETS 28 gauge lancets     [DISCONTINUED] FREESTYLE LITE STRIPS Strp     [DISCONTINUED] METHYL SALICYLATE/MENTHOL (BANALG-HS LINIMENT TOP) Apply topically.    [DISCONTINUED] metoprolol succinate (TOPROL-XL) 25 MG 24 hr tablet Take  25 mg by mouth once daily.    [DISCONTINUED] msm-aloe vera-herbal66-emu oil Gel Apply topically.    [DISCONTINUED] multivitamin with folic acid 400 mcg Tab Take 1 tablet by mouth.    [DISCONTINUED] mupirocin (BACTROBAN) 2 % ointment 2 (two) times daily. Apply to affected area    [DISCONTINUED] oxybutynin (DITROPAN) 5 MG Tab     [DISCONTINUED] peg 400-propylene glycol, PF, (SYSTANE ULTRA/LUBRICANT EYE, PF,) 0.4-0.3 % Dpet Apply to eye.    [DISCONTINUED] polyethylene glycol (GLYCOLAX) 17 gram PwPk Take by mouth.    [DISCONTINUED] PROPYLENE GLYCOL/ (SYSTANE OPHT) Apply to eye.    [DISCONTINUED] sodium chloride 0.9 % SprA by Nasal route.    [DISCONTINUED] SSD 1 % cream     [DISCONTINUED] tramadol (ULTRAM) 50 mg tablet     [DISCONTINUED] triamcinolone acetonide 0.1% (KENALOG) 0.1 % cream Apply topically 2 (two) times daily.     Family History       Problem Relation (Age of Onset)    Cancer Mother, Sister    Diabetes Brother          Tobacco Use    Smoking status: Never Smoker    Smokeless tobacco: Never Used   Substance and Sexual Activity    Alcohol use: No    Drug use: No    Sexual activity: Not on file     Review of Systems   Constitutional:  Negative for fatigue and fever.   HENT:  Negative for sinus pressure.    Eyes:  Negative for visual disturbance.   Respiratory:  Negative for shortness of breath.    Cardiovascular:  Negative for chest pain.   Gastrointestinal:  Negative for nausea and vomiting.   Genitourinary:  Negative for difficulty urinating.   Musculoskeletal:  Positive for arthralgias (left hip pain). Negative for back pain.   Skin:  Negative for rash.   Neurological:  Negative for headaches.   Psychiatric/Behavioral:  Negative for confusion.    Objective:     Vital Signs (Most Recent):  Temp: 98.3 °F (36.8 °C) (07/19/22 1300)  Pulse: 66 (07/19/22 1300)  Resp: 18 (07/19/22 1134)  BP: 137/71 (07/19/22 1300)  SpO2: 99 % (07/19/22 1300) Vital Signs (24h Range):  Temp:  [98.3 °F (36.8 °C)-98.7 °F (37.1  °C)] 98.3 °F (36.8 °C)  Pulse:  [54-66] 66  Resp:  [16-18] 18  SpO2:  [98 %-99 %] 99 %  BP: (125-137)/(69-71) 137/71     Weight: 72 kg (158 lb 12.8 oz)  Body mass index is 28.13 kg/m².    Physical Exam  Constitutional:       General: She is not in acute distress.     Appearance: She is well-developed. She is not diaphoretic.   HENT:      Head: Normocephalic and atraumatic.   Eyes:      Pupils: Pupils are equal, round, and reactive to light.   Cardiovascular:      Rate and Rhythm: Normal rate and regular rhythm.      Heart sounds: Normal heart sounds. No murmur heard.    No friction rub. No gallop.   Pulmonary:      Effort: Pulmonary effort is normal. No respiratory distress.      Breath sounds: Normal breath sounds. No stridor. No wheezing or rales.   Abdominal:      General: Bowel sounds are normal. There is no distension.      Palpations: Abdomen is soft. There is no mass.      Tenderness: There is no abdominal tenderness. There is no guarding.   Musculoskeletal:      Comments: Left lower extremity shortened and externally rotated   Skin:     General: Skin is warm.      Findings: No erythema.   Neurological:      Mental Status: She is alert. Mental status is at baseline.         CRANIAL NERVES     CN III, IV, VI   Pupils are equal, round, and reactive to light.     Significant Labs:   Results for orders placed or performed during the hospital encounter of 07/19/22   CBC auto differential   Result Value Ref Range    WBC 12.83 (H) 3.90 - 12.70 K/uL    RBC 2.61 (L) 4.00 - 5.40 M/uL    Hemoglobin 8.8 (L) 12.0 - 16.0 g/dL    Hematocrit 27.7 (L) 37.0 - 48.5 %     (H) 82 - 98 fL    MCH 33.7 (H) 27.0 - 31.0 pg    MCHC 31.8 (L) 32.0 - 36.0 g/dL    RDW 14.0 11.5 - 14.5 %    Platelets 176 150 - 450 K/uL    MPV 10.8 9.2 - 12.9 fL    Immature Granulocytes 0.8 (H) 0.0 - 0.5 %    Gran # (ANC) 11.5 (H) 1.8 - 7.7 K/uL    Immature Grans (Abs) 0.10 (H) 0.00 - 0.04 K/uL    Lymph # 0.6 (L) 1.0 - 4.8 K/uL    Mono # 0.5 0.3 - 1.0  K/uL    Eos # 0.1 0.0 - 0.5 K/uL    Baso # 0.01 0.00 - 0.20 K/uL    nRBC 0 0 /100 WBC    Gran % 89.9 (H) 38.0 - 73.0 %    Lymph % 4.8 (L) 18.0 - 48.0 %    Mono % 3.9 (L) 4.0 - 15.0 %    Eosinophil % 0.5 0.0 - 8.0 %    Basophil % 0.1 0.0 - 1.9 %    Differential Method Automated    Comprehensive metabolic panel   Result Value Ref Range    Sodium 144 136 - 145 mmol/L    Potassium 4.3 3.5 - 5.1 mmol/L    Chloride 111 (H) 95 - 110 mmol/L    CO2 24 23 - 29 mmol/L    Glucose 257 (H) 70 - 110 mg/dL    BUN 36 (H) 8 - 23 mg/dL    Creatinine 1.1 0.5 - 1.4 mg/dL    Calcium 9.1 8.7 - 10.5 mg/dL    Total Protein 6.5 6.0 - 8.4 g/dL    Albumin 3.7 3.5 - 5.2 g/dL    Total Bilirubin 0.5 0.1 - 1.0 mg/dL    Alkaline Phosphatase 70 55 - 135 U/L    AST 24 10 - 40 U/L    ALT 19 10 - 44 U/L    Anion Gap 9 8 - 16 mmol/L    eGFR if African American 51 (A) >60 mL/min/1.73 m^2    eGFR if non African American 44 (A) >60 mL/min/1.73 m^2   CPK   Result Value Ref Range     (H) 20 - 180 U/L   Urinalysis, Reflex to Urine Culture Urine, Catheterized    Specimen: Urine   Result Value Ref Range    Specimen UA Urine, Clean Catch     Color, UA Yellow Yellow, Straw, Lia    Appearance, UA Hazy (A) Clear    pH, UA 6.0 5.0 - 8.0    Specific Gravity, UA 1.010 1.005 - 1.030    Protein, UA Negative Negative    Glucose, UA Negative Negative    Ketones, UA Negative Negative    Bilirubin (UA) Negative Negative    Occult Blood UA Negative Negative    Nitrite, UA Positive (A) Negative    Urobilinogen, UA Negative <2.0 EU/dL    Leukocytes, UA Trace (A) Negative   COVID-19 Rapid Screening   Result Value Ref Range    SARS-CoV-2 RNA, Amplification, Qual Negative Negative   Urinalysis Microscopic   Result Value Ref Range    RBC, UA 1 0 - 4 /hpf    WBC, UA 4 0 - 5 /hpf    WBC Clumps, UA Rare None-Rare    Bacteria Occasional None-Occ /hpf    Hyaline Casts, UA 6 (A) 0-1/lpf /lpf    Microscopic Comment SEE COMMENT    POCT glucose   Result Value Ref Range    POCT  Glucose 193 (H) 70 - 110 mg/dL        Significant Imaging: X-Ray Chest AP Portable  Narrative: EXAMINATION:  XR CHEST AP PORTABLE    CLINICAL HISTORY:  Unspecified fall, initial encounter    FINDINGS:  Single view of the chest.  No comparison.    Cardiac silhouette is normal.  The lungs demonstrate no evidence of active disease.  No evidence of pleural effusion or pneumothorax.  Bones appear intact with diffuse osteopenia and moderate degenerative changes.  Aorta demonstrates atherosclerotic disease.  Impression: No acute process seen.    Electronically signed by: Artie Starkey MD  Date:    07/19/2022  Time:    11:43  X-Ray Femur Ap/Lat Left  Narrative: EXAMINATION:  XR FEMUR 2 VIEW LEFT    CLINICAL HISTORY:  XR FEMUR 2 VIEW LEFTUnspecified injury of left lower leg, initial encounter    COMPARISON:  None    FINDINGS:  Four views of the left femur were obtained.    Spiral fracture of the proximal femoral diaphysis just below hip prosthesis with moderate to severe displacement of a proximally 3-4 cm.  No evidence of hip dislocation.  Diffuse osteopenia .  Diffuse soft tissue edema.  Vascular calcifications are noted.  Moderate degenerative changes of the knee joint.  Impression: See above    Electronically signed by: Artie Starkey MD  Date:    07/19/2022  Time:    11:42  X-Ray Hip 2 or 3 views Left (with Pelvis when performed)  Narrative: EXAMINATION:  XR HIP WITH PELVIS WHEN PERFORMED, 2 OR 3 VIEWS LEFT    CLINICAL HISTORY:  XR HIP WITH PELVIS WHEN PERFORMED, 2 OR 3 VIEWS LEFT    COMPARISON:  None    FINDINGS:  Three views of the left hip were obtained.    Partially visualized spiral fracture of the left femoral diaphysis.  Left hip replacement without evidence of dislocation.  Intramedullary lianne and single pen through right-sided intra trochanteric fracture which has a chronic appearance.  Evaluation is limited.  Chronic fracture deformity within the visualized portion of the proximal right femoral diaphysis.   Diffuse osteopenia.  Dense vascular calcifications.  Impression: Partially visualized spiral fracture of the left femoral diaphysis.  Left hip replacement without evidence of dislocation.    Electronically signed by: Artie Starkey MD  Date:    07/19/2022  Time:    11:05  X-Ray Lumbar Spine Ap And Lateral  Narrative: EXAMINATION:  XR LUMBAR SPINE AP AND LATERAL    CLINICAL HISTORY:  fall;    COMPARISON:  None    FINDINGS:  3 views of the lumbar spine.    Scoliotic curvature throughout the lumbar spine.  Superior endplate degenerative changes and mild wedging within multiple levels of the lumbar spine thought to be chronic.  Advanced degenerative changes with spondylosis, facet arthropathy and multilevel disc space narrowing.  No evidence of spondylolysis or spondylolisthesis. Vertebral body heights are normal.    Suspect cholecystectomy clips.  Moderate constipation.  Aorta demonstrates atherosclerotic disease.  Impression: See above.  MRI can be obtained as clinically warranted if there is concern for occult fracture.    Electronically signed by: Artie Starkey MD  Date:    07/19/2022  Time:    11:04

## 2022-07-19 NOTE — ASSESSMENT & PLAN NOTE
I discussed with the patient and her son her injury reviewed her x-rays with them.  I recommend proceeding with open reduction internal fixation of her periprosthetic [place] B1 left femur fracture.  Patient's son understands inherent risks and benefits of the procedure and elected to proceed.  I have discussed her case with our colleagues in Anesthesia.  Patient has also been evaluated by Medicine.  We will plan to proceed with surgical intervention this evening.  Patient has been NPO since 8:30a this morning.  We will also order a vitamin-D level as well as the parathyroid hormone intact level as workup for her metabolic bone disease.

## 2022-07-19 NOTE — ED PROVIDER NOTES
SCRIBE #1 NOTE: I, Miguel Raymundo, am scribing for, and in the presence of, Vi Harrison MD. I have scribed the entire note.      History      Chief Complaint   Patient presents with    Fall     Fell today while walking to bathroom w/o walker. L leg pain, can't stand.       Review of patient's allergies indicates:   Allergen Reactions    Alendronate Other (See Comments)    Atorvastatin Other (See Comments)    Ciprofloxacin Other (See Comments)    Codeine Itching    Diazepam Itching    Ibuprofen Other (See Comments)    Oxycodone-acetaminophen Other (See Comments) and Itching    Rofecoxib Other (See Comments)    Seldane     Simvastatin Other (See Comments)        HPI   HPI    7/19/2022, 10:56 AM   History obtained from the patient and family      History of Present Illness: Rosy Balderrama is a 89 y.o. female patient with a PMhx of DM2, osteoporosis s/p bilateral hip replacement who presents to the Emergency Department for evaluation following a fall just PTA. Pt does not remember the details of her fall, but family states that the pt fell while trying to walk in the bathroom without her walker. Pt was found on the ground in the bathroom. Pt reports L hip pain and lower back pain. Symptoms are constant and moderate in severity. No mitigating or exacerbating factors reported. Patient denies any head trauma, fever, chills, n/v/d, SOB, CP, weakness, numbness, dizziness, headache, and all other sxs at this time. Pt currently follows with Pain Management. Pt last ate at 8:30 AM this morning. No further complaints or concerns at this time.     Arrival mode: Personal vehicle    PCP: Maude Barrios MD       Past Medical History:  Past Medical History:   Diagnosis Date    *Atrial fibrillation     Allergy     Blood transfusion     Cataract     Diabetes mellitus type II     Hyperlipidemia     Osteoporosis        Past Surgical History:  Past Surgical History:   Procedure Laterality Date    ANKLE SURGERY       broken ankle    BILATERAL SALPINGOOPHORECTOMY      BREAST BIOPSY      CARPAL TUNNEL RELEASE      CHOLECYSTECTOMY      COLONOSCOPY      ERCP      ESOPHAGOGASTRODUODENOSCOPY      FRACTURE SURGERY      KNEE CARTILAGE SURGERY      TOTAL ABDOMINAL HYSTERECTOMY      TOTAL HIP ARTHROPLASTY      TUBAL LIGATION           Family History:  Family History   Problem Relation Age of Onset    Cancer Mother     Cancer Sister     Diabetes Brother        Social History:  Social History     Tobacco Use    Smoking status: Never Smoker    Smokeless tobacco: Never Used   Substance and Sexual Activity    Alcohol use: No    Drug use: No    Sexual activity: Not on file       ROS   Review of Systems   Constitutional: Negative for chills and fever.   HENT: Negative for sore throat.    Respiratory: Negative for shortness of breath.    Cardiovascular: Negative for chest pain.   Gastrointestinal: Negative for diarrhea, nausea and vomiting.   Genitourinary: Negative for dysuria.   Musculoskeletal: Positive for arthralgias (L hip) and back pain (lower).   Skin: Negative for rash.   Neurological: Negative for dizziness, weakness, light-headedness, numbness and headaches.   Hematological: Does not bruise/bleed easily.   All other systems reviewed and are negative.    Physical Exam      Initial Vitals [07/19/22 1014]   BP Pulse Resp Temp SpO2   125/69 (!) 54 16 98.7 °F (37.1 °C) 98 %      MAP       --          Physical Exam  Nursing Notes and Vital Signs Reviewed.  Constitutional: Patient is in no acute distress. Well-developed and well-nourished.  Head: Atraumatic. Normocephalic. No evidence of head trauma.  Eyes: PERRL. EOM intact. Conjunctivae are not pale. No scleral icterus.  ENT: Mucous membranes are moist. Oropharynx is clear and symmetric.    Neck: Supple. Full ROM.  Cardiovascular: Regular rate. Regular rhythm. No murmurs, rubs, or gallops. Distal pulses are 2+ and symmetric.  Pulmonary/Chest: No respiratory distress.  Clear to auscultation bilaterally. No wheezing or rales.  Abdominal: Soft and non-distended.  There is no tenderness.  No rebound, guarding, or rigidity.   Musculoskeletal: Obvious shortening and rotation of the LLE. Pelvis is otherwise stable. Soft tissue swelling to the proximal aspect of the L anterior thigh. Compartments are soft.  Skin: Warm and dry.  Neurological:  Alert, awake, and appropriate.  Normal speech.  No acute focal neurological deficits are appreciated.  Psychiatric: Normal affect. Good eye contact. Appropriate in content.    ED Course    Procedures  ED Vital Signs:  Vitals:    07/19/22 1014 07/19/22 1044 07/19/22 1134 07/19/22 1300   BP: 125/69   137/71   Pulse: (!) 54   66   Resp: 16  18    Temp: 98.7 °F (37.1 °C)   98.3 °F (36.8 °C)   TempSrc: Oral      SpO2: 98%   99%   Weight:  72 kg (158 lb 12.8 oz)         Abnormal Lab Results:  Labs Reviewed   CBC W/ AUTO DIFFERENTIAL - Abnormal; Notable for the following components:       Result Value    WBC 12.83 (*)     RBC 2.61 (*)     Hemoglobin 8.8 (*)     Hematocrit 27.7 (*)      (*)     MCH 33.7 (*)     MCHC 31.8 (*)     Immature Granulocytes 0.8 (*)     Gran # (ANC) 11.5 (*)     Immature Grans (Abs) 0.10 (*)     Lymph # 0.6 (*)     Gran % 89.9 (*)     Lymph % 4.8 (*)     Mono % 3.9 (*)     All other components within normal limits   COMPREHENSIVE METABOLIC PANEL - Abnormal; Notable for the following components:    Chloride 111 (*)     Glucose 257 (*)     BUN 36 (*)     eGFR if  51 (*)     eGFR if non  44 (*)     All other components within normal limits   CK - Abnormal; Notable for the following components:     (*)     All other components within normal limits   URINALYSIS, REFLEX TO URINE CULTURE - Abnormal; Notable for the following components:    Appearance, UA Hazy (*)     Nitrite, UA Positive (*)     Leukocytes, UA Trace (*)     All other components within normal limits    Narrative:     Specimen  Source->Urine   URINALYSIS MICROSCOPIC - Abnormal; Notable for the following components:    Hyaline Casts, UA 6 (*)     All other components within normal limits    Narrative:     Specimen Source->Urine   POCT GLUCOSE - Abnormal; Notable for the following components:    POCT Glucose 193 (*)     All other components within normal limits   SARS-COV-2 RNA AMPLIFICATION, QUAL   HEMOGLOBIN A1C   FOLATE   VITAMIN B12   POCT GLUCOSE MONITORING CONTINUOUS        All Lab Results:  Results for orders placed or performed during the hospital encounter of 07/19/22   CBC auto differential   Result Value Ref Range    WBC 12.83 (H) 3.90 - 12.70 K/uL    RBC 2.61 (L) 4.00 - 5.40 M/uL    Hemoglobin 8.8 (L) 12.0 - 16.0 g/dL    Hematocrit 27.7 (L) 37.0 - 48.5 %     (H) 82 - 98 fL    MCH 33.7 (H) 27.0 - 31.0 pg    MCHC 31.8 (L) 32.0 - 36.0 g/dL    RDW 14.0 11.5 - 14.5 %    Platelets 176 150 - 450 K/uL    MPV 10.8 9.2 - 12.9 fL    Immature Granulocytes 0.8 (H) 0.0 - 0.5 %    Gran # (ANC) 11.5 (H) 1.8 - 7.7 K/uL    Immature Grans (Abs) 0.10 (H) 0.00 - 0.04 K/uL    Lymph # 0.6 (L) 1.0 - 4.8 K/uL    Mono # 0.5 0.3 - 1.0 K/uL    Eos # 0.1 0.0 - 0.5 K/uL    Baso # 0.01 0.00 - 0.20 K/uL    nRBC 0 0 /100 WBC    Gran % 89.9 (H) 38.0 - 73.0 %    Lymph % 4.8 (L) 18.0 - 48.0 %    Mono % 3.9 (L) 4.0 - 15.0 %    Eosinophil % 0.5 0.0 - 8.0 %    Basophil % 0.1 0.0 - 1.9 %    Differential Method Automated    Comprehensive metabolic panel   Result Value Ref Range    Sodium 144 136 - 145 mmol/L    Potassium 4.3 3.5 - 5.1 mmol/L    Chloride 111 (H) 95 - 110 mmol/L    CO2 24 23 - 29 mmol/L    Glucose 257 (H) 70 - 110 mg/dL    BUN 36 (H) 8 - 23 mg/dL    Creatinine 1.1 0.5 - 1.4 mg/dL    Calcium 9.1 8.7 - 10.5 mg/dL    Total Protein 6.5 6.0 - 8.4 g/dL    Albumin 3.7 3.5 - 5.2 g/dL    Total Bilirubin 0.5 0.1 - 1.0 mg/dL    Alkaline Phosphatase 70 55 - 135 U/L    AST 24 10 - 40 U/L    ALT 19 10 - 44 U/L    Anion Gap 9 8 - 16 mmol/L    eGFR if African  American 51 (A) >60 mL/min/1.73 m^2    eGFR if non African American 44 (A) >60 mL/min/1.73 m^2   CPK   Result Value Ref Range     (H) 20 - 180 U/L   Urinalysis, Reflex to Urine Culture Urine, Catheterized    Specimen: Urine   Result Value Ref Range    Specimen UA Urine, Clean Catch     Color, UA Yellow Yellow, Straw, Lia    Appearance, UA Hazy (A) Clear    pH, UA 6.0 5.0 - 8.0    Specific Gravity, UA 1.010 1.005 - 1.030    Protein, UA Negative Negative    Glucose, UA Negative Negative    Ketones, UA Negative Negative    Bilirubin (UA) Negative Negative    Occult Blood UA Negative Negative    Nitrite, UA Positive (A) Negative    Urobilinogen, UA Negative <2.0 EU/dL    Leukocytes, UA Trace (A) Negative   COVID-19 Rapid Screening   Result Value Ref Range    SARS-CoV-2 RNA, Amplification, Qual Negative Negative   Urinalysis Microscopic   Result Value Ref Range    RBC, UA 1 0 - 4 /hpf    WBC, UA 4 0 - 5 /hpf    WBC Clumps, UA Rare None-Rare    Bacteria Occasional None-Occ /hpf    Hyaline Casts, UA 6 (A) 0-1/lpf /lpf    Microscopic Comment SEE COMMENT    POCT glucose   Result Value Ref Range    POCT Glucose 193 (H) 70 - 110 mg/dL     Imaging Results:  Imaging Results          X-Ray Femur Ap/Lat Left (Final result)  Result time 07/19/22 11:42:48    Final result by Artie Starkey MD (07/19/22 11:42:48)                 Impression:      See above      Electronically signed by: Artie Starkey MD  Date:    07/19/2022  Time:    11:42             Narrative:    EXAMINATION:  XR FEMUR 2 VIEW LEFT    CLINICAL HISTORY:  XR FEMUR 2 VIEW LEFTUnspecified injury of left lower leg, initial encounter    COMPARISON:  None    FINDINGS:  Four views of the left femur were obtained.    Spiral fracture of the proximal femoral diaphysis just below hip prosthesis with moderate to severe displacement of a proximally 3-4 cm.  No evidence of hip dislocation.  Diffuse osteopenia .  Diffuse soft tissue edema.  Vascular calcifications are  noted.  Moderate degenerative changes of the knee joint.                               X-Ray Chest AP Portable (Final result)  Result time 07/19/22 11:43:20    Final result by Artie Starkey MD (07/19/22 11:43:20)                 Impression:      No acute process seen.      Electronically signed by: Artie Starkey MD  Date:    07/19/2022  Time:    11:43             Narrative:    EXAMINATION:  XR CHEST AP PORTABLE    CLINICAL HISTORY:  Unspecified fall, initial encounter    FINDINGS:  Single view of the chest.  No comparison.    Cardiac silhouette is normal.  The lungs demonstrate no evidence of active disease.  No evidence of pleural effusion or pneumothorax.  Bones appear intact with diffuse osteopenia and moderate degenerative changes.  Aorta demonstrates atherosclerotic disease.                               X-Ray Lumbar Spine Ap And Lateral (Final result)  Result time 07/19/22 11:04:11    Final result by Artie Starkey MD (07/19/22 11:04:11)                 Impression:      See above.  MRI can be obtained as clinically warranted if there is concern for occult fracture.      Electronically signed by: Artie Starkey MD  Date:    07/19/2022  Time:    11:04             Narrative:    EXAMINATION:  XR LUMBAR SPINE AP AND LATERAL    CLINICAL HISTORY:  fall;    COMPARISON:  None    FINDINGS:  3 views of the lumbar spine.    Scoliotic curvature throughout the lumbar spine.  Superior endplate degenerative changes and mild wedging within multiple levels of the lumbar spine thought to be chronic.  Advanced degenerative changes with spondylosis, facet arthropathy and multilevel disc space narrowing.  No evidence of spondylolysis or spondylolisthesis. Vertebral body heights are normal.    Suspect cholecystectomy clips.  Moderate constipation.  Aorta demonstrates atherosclerotic disease.                               X-Ray Hip 2 or 3 views Left (with Pelvis when performed) (Final result)  Result time 07/19/22 11:05:59     Final result by Artie Starkey MD (07/19/22 11:05:59)                 Impression:      Partially visualized spiral fracture of the left femoral diaphysis.  Left hip replacement without evidence of dislocation.      Electronically signed by: Artie Starkey MD  Date:    07/19/2022  Time:    11:05             Narrative:    EXAMINATION:  XR HIP WITH PELVIS WHEN PERFORMED, 2 OR 3 VIEWS LEFT    CLINICAL HISTORY:  XR HIP WITH PELVIS WHEN PERFORMED, 2 OR 3 VIEWS LEFT    COMPARISON:  None    FINDINGS:  Three views of the left hip were obtained.    Partially visualized spiral fracture of the left femoral diaphysis.  Left hip replacement without evidence of dislocation.  Intramedullary lianne and single pen through right-sided intra trochanteric fracture which has a chronic appearance.  Evaluation is limited.  Chronic fracture deformity within the visualized portion of the proximal right femoral diaphysis.  Diffuse osteopenia.  Dense vascular calcifications.                               The EKG was ordered, reviewed, and independently interpreted by the ED provider.  Interpretation time: 12:08  Rate: 66 BPM  Rhythm: normal sinus rhythm  Interpretation: No acute ST changes. No STEMI.           The Emergency Provider reviewed the vital signs and test results, which are outlined above.    ED Discussion     1:09 PM: Discussed case with Chris Ruiz NP ([facility]). Dr. Fields agrees with current care and management of pt and accepts admission.   Admitting Service: Hospital Medicine  Admitting Physician: Dr. Fields  Admit to: Obs Med Surg    1:10 PM: Re-evaluated pt. I have discussed test results, shared treatment plan, and the need for admission with patient and family at bedside. Pt and family express understanding at this time and agree with all information. All questions answered. Pt and family have no further questions or concerns at this time. Pt is ready for admit.    1:46 PM: Discussed pt's case with Dr. Marx (Orthopedic  Surgery), who agrees with plan for admission.         ED Medication(s):  Medications   memantine tablet 10 mg (has no administration in time range)   metoprolol succinate (TOPROL-XL) 24 hr tablet 25 mg (has no administration in time range)   QUEtiapine tablet 25 mg (has no administration in time range)   glucagon (human recombinant) injection 1 mg (has no administration in time range)   dextrose 10% bolus 125 mL (has no administration in time range)   dextrose 10% bolus 250 mL (has no administration in time range)   insulin aspart U-100 pen 0-5 Units (has no administration in time range)   HYDROcodone-acetaminophen 7.5-325 mg per tablet 1 tablet (has no administration in time range)   morphine injection 2 mg (has no administration in time range)   ondansetron disintegrating tablet 4 mg (has no administration in time range)   acetaminophen tablet 650 mg (has no administration in time range)   enoxaparin injection 40 mg (has no administration in time range)   bumetanide tablet 1 mg (has no administration in time range)   pravastatin tablet 40 mg (has no administration in time range)   raloxifene tablet 60 mg (has no administration in time range)   vitamin D 1000 units tablet 1,000 Units (has no administration in time range)   0.9%  NaCl infusion (has no administration in time range)   ondansetron injection 4 mg (4 mg Intravenous Given 7/19/22 1133)   fentaNYL 50 mcg/mL injection 75 mcg (75 mcg Intravenous Given 7/19/22 1134)   cefTRIAXone (ROCEPHIN) 1 g/50 mL D5W IVPB (0 g Intravenous Stopped 7/19/22 1317)          New Prescriptions    No medications on file         Medical Decision Making    Medical Decision Making:   Clinical Tests:   Lab Tests: Ordered and Reviewed  Radiological Study: Ordered and Reviewed  Medical Tests: Ordered and Reviewed           Scribe Attestation:   Scribe #1: I performed the above scribed service and the documentation accurately describes the services I performed. I attest to the accuracy  of the note.    Attending:   Physician Attestation Statement for Scribe #1: I, Vi Harrison MD, personally performed the services described in this documentation, as scribed by Miguel Raymundo, in my presence, and it is both accurate and complete.          Clinical Impression       ICD-10-CM ICD-9-CM   1. Closed displaced spiral fracture of shaft of left femur, initial encounter  S72.342A 821.01   2. Fall  W19.XXXA E888.9   3. Left leg injury  S89.92XA 959.7   4. Fall at home, initial encounter  W19.XXXA E888.9    Y92.009 E849.0   5. Acute cystitis without hematuria  N30.00 595.0       Disposition:   Disposition: Placed in Observation  Condition: Fair         Vi Harrison MD  07/19/22 3307

## 2022-07-19 NOTE — ANESTHESIA PROCEDURE NOTES
Intubation    Date/Time: 7/19/2022 5:09 PM  Performed by: Albaro Kidd CRNA  Authorized by: Eleazar Todd MD     Intubation:     Induction:  Intravenous    Mask Ventilation:  Easy mask    Attempts:  1    Attempted By:  CRNA    Method of Intubation:  Direct    Blade:  Foster 2    Laryngeal View Grade: Grade I - full view of cords      Difficult Airway Encountered?: No      Complications:  None    Airway Device:  Oral endotracheal tube    Airway Device Size:  7.5    Style/Cuff Inflation:  Cuffed    Inflation Amount (mL):  5    Tube secured:  21    Secured at:  The lips    Placement Verified By:  Capnometry    Complicating Factors:  None    Findings Post-Intubation:  BS equal bilateral and atraumatic/condition of teeth unchanged

## 2022-07-19 NOTE — ASSESSMENT & PLAN NOTE
Continue home metoprolol  Patient not on anticoagulation  No history of bleeding reported  Demar Vasc score 4  Will likely need short term OAC VTE prophylaxis from Orthopedic surgery  Will defer long term anticoagulation to PCP

## 2022-07-19 NOTE — ANESTHESIA PREPROCEDURE EVALUATION
07/19/2022  Rosy Balderrama is a 89 y.o., female.    Patient Active Problem List   Diagnosis    Diabetes mellitus type 2, controlled, without complications    Hyperlipidemia    Rotator cuff arthropathy, right    Trigger finger, right middle finger    Type II diabetes mellitus with neurological manifestations    Paroxysmal atrial fibrillation    Osteoporosis    Closed left femoral fracture    Periprosthetic fracture of femur following total replacement of hip     Past Surgical History:   Procedure Laterality Date    ANKLE SURGERY      broken ankle    BILATERAL SALPINGOOPHORECTOMY      BREAST BIOPSY      CARPAL TUNNEL RELEASE      CHOLECYSTECTOMY      COLONOSCOPY      ERCP      ESOPHAGOGASTRODUODENOSCOPY      FRACTURE SURGERY      KNEE CARTILAGE SURGERY      TOTAL ABDOMINAL HYSTERECTOMY      TOTAL HIP ARTHROPLASTY      TUBAL LIGATION         Pre-op Assessment    I have reviewed the Patient Summary Reports.     I have reviewed the Nursing Notes. I have reviewed the NPO Status.   I have reviewed the Medications.     Review of Systems  Anesthesia Hx:  No problems with previous Anesthesia    Social:  Non-Smoker    Hematology/Oncology:         -- Anemia:   Cardiovascular:   Dysrhythmias atrial fibrillation hyperlipidemia    Pulmonary:  Pulmonary Normal    Renal/:   Chronic Renal Disease, CRI    Hepatic/GI:  Hepatic/GI Normal    Musculoskeletal:  Musculoskeletal Normal    Neurological:  Dementia    Endocrine:   Diabetes, well controlled        Physical Exam  General: Well nourished, Cooperative and Alert    Airway:  Mallampati: III   Mouth Opening: Normal  TM Distance: Normal  Neck ROM: Normal ROM    Dental:  Intact  Chipped upper incisor    Lab Results   Component Value Date    WBC 12.83 (H) 07/19/2022    HGB 8.8 (L) 07/19/2022    HCT 27.7 (L) 07/19/2022     (H) 07/19/2022      07/19/2022         Chemistry        Component Value Date/Time     07/19/2022 1132    K 4.3 07/19/2022 1132     (H) 07/19/2022 1132    CO2 24 07/19/2022 1132    BUN 36 (H) 07/19/2022 1132    CREATININE 1.1 07/19/2022 1132     (H) 07/19/2022 1132        Component Value Date/Time    CALCIUM 9.1 07/19/2022 1132    ALKPHOS 70 07/19/2022 1132    AST 24 07/19/2022 1132    ALT 19 07/19/2022 1132    BILITOT 0.5 07/19/2022 1132    ESTGFRAFRICA 51 (A) 07/19/2022 1132    EGFRNONAA 44 (A) 07/19/2022 1132        EKG 7/19/2022  NSR    Anesthesia Plan  Type of Anesthesia, risks & benefits discussed:    Anesthesia Type: Gen ETT  Intra-op Monitoring Plan: Standard ASA Monitors  Post Op Pain Control Plan: IV/PO Opioids PRN  Induction:  IV  Airway Plan: Direct  Informed Consent: Informed consent signed with the Patient representative and all parties understand the risks and agree with anesthesia plan.  All questions answered.   ASA Score: 3    Ready For Surgery From Anesthesia Perspective.     .

## 2022-07-20 ENCOUNTER — TELEPHONE (OUTPATIENT)
Dept: ORTHOPEDICS | Facility: CLINIC | Age: 87
End: 2022-07-20
Payer: MEDICARE

## 2022-07-20 DIAGNOSIS — R10.2 PAIN IN PELVIS: ICD-10-CM

## 2022-07-20 DIAGNOSIS — M89.8X5 PAIN OF LEFT FEMUR: Primary | ICD-10-CM

## 2022-07-20 LAB
25(OH)D3+25(OH)D2 SERPL-MCNC: 48 NG/ML (ref 30–96)
ANION GAP SERPL CALC-SCNC: 11 MMOL/L (ref 8–16)
BASOPHILS # BLD AUTO: 0.01 K/UL (ref 0–0.2)
BASOPHILS NFR BLD: 0.1 % (ref 0–1.9)
BUN SERPL-MCNC: 37 MG/DL (ref 8–23)
CALCIUM SERPL-MCNC: 8 MG/DL (ref 8.7–10.5)
CHLORIDE SERPL-SCNC: 111 MMOL/L (ref 95–110)
CO2 SERPL-SCNC: 21 MMOL/L (ref 23–29)
CREAT SERPL-MCNC: 1.2 MG/DL (ref 0.5–1.4)
DIFFERENTIAL METHOD: ABNORMAL
EOSINOPHIL # BLD AUTO: 0 K/UL (ref 0–0.5)
EOSINOPHIL NFR BLD: 0.1 % (ref 0–8)
ERYTHROCYTE [DISTWIDTH] IN BLOOD BY AUTOMATED COUNT: 18.5 % (ref 11.5–14.5)
EST. GFR  (AFRICAN AMERICAN): 46 ML/MIN/1.73 M^2
EST. GFR  (NON AFRICAN AMERICAN): 40 ML/MIN/1.73 M^2
GLUCOSE SERPL-MCNC: 148 MG/DL (ref 70–110)
HCT VFR BLD AUTO: 24.3 % (ref 37–48.5)
HGB BLD-MCNC: 8.1 G/DL (ref 12–16)
IMM GRANULOCYTES # BLD AUTO: 0.04 K/UL (ref 0–0.04)
IMM GRANULOCYTES NFR BLD AUTO: 0.4 % (ref 0–0.5)
LYMPHOCYTES # BLD AUTO: 1 K/UL (ref 1–4.8)
LYMPHOCYTES NFR BLD: 9.3 % (ref 18–48)
MCH RBC QN AUTO: 32.9 PG (ref 27–31)
MCHC RBC AUTO-ENTMCNC: 33.3 G/DL (ref 32–36)
MCV RBC AUTO: 99 FL (ref 82–98)
MONOCYTES # BLD AUTO: 0.7 K/UL (ref 0.3–1)
MONOCYTES NFR BLD: 6.7 % (ref 4–15)
NEUTROPHILS # BLD AUTO: 9.3 K/UL (ref 1.8–7.7)
NEUTROPHILS NFR BLD: 83.4 % (ref 38–73)
NRBC BLD-RTO: 0 /100 WBC
PLATELET # BLD AUTO: 170 K/UL (ref 150–450)
PMV BLD AUTO: 11.2 FL (ref 9.2–12.9)
POCT GLUCOSE: 150 MG/DL (ref 70–110)
POCT GLUCOSE: 161 MG/DL (ref 70–110)
POCT GLUCOSE: 217 MG/DL (ref 70–110)
POTASSIUM SERPL-SCNC: 4.8 MMOL/L (ref 3.5–5.1)
RBC # BLD AUTO: 2.46 M/UL (ref 4–5.4)
SODIUM SERPL-SCNC: 143 MMOL/L (ref 136–145)
WBC # BLD AUTO: 11.11 K/UL (ref 3.9–12.7)

## 2022-07-20 PROCEDURE — 36415 COLL VENOUS BLD VENIPUNCTURE: CPT | Performed by: FAMILY MEDICINE

## 2022-07-20 PROCEDURE — 94761 N-INVAS EAR/PLS OXIMETRY MLT: CPT

## 2022-07-20 PROCEDURE — 99024 POSTOP FOLLOW-UP VISIT: CPT | Mod: POP,,, | Performed by: PHYSICIAN ASSISTANT

## 2022-07-20 PROCEDURE — 63600175 PHARM REV CODE 636 W HCPCS: Performed by: FAMILY MEDICINE

## 2022-07-20 PROCEDURE — 97535 SELF CARE MNGMENT TRAINING: CPT

## 2022-07-20 PROCEDURE — 99024 PR POST-OP FOLLOW-UP VISIT: ICD-10-PCS | Mod: POP,,, | Performed by: PHYSICIAN ASSISTANT

## 2022-07-20 PROCEDURE — 25000003 PHARM REV CODE 250: Performed by: FAMILY MEDICINE

## 2022-07-20 PROCEDURE — 25000003 PHARM REV CODE 250: Performed by: ORTHOPAEDIC SURGERY

## 2022-07-20 PROCEDURE — 97162 PT EVAL MOD COMPLEX 30 MIN: CPT

## 2022-07-20 PROCEDURE — 97110 THERAPEUTIC EXERCISES: CPT

## 2022-07-20 PROCEDURE — 80048 BASIC METABOLIC PNL TOTAL CA: CPT | Performed by: FAMILY MEDICINE

## 2022-07-20 PROCEDURE — 63600175 PHARM REV CODE 636 W HCPCS: Performed by: ORTHOPAEDIC SURGERY

## 2022-07-20 PROCEDURE — 85025 COMPLETE CBC W/AUTO DIFF WBC: CPT | Performed by: FAMILY MEDICINE

## 2022-07-20 PROCEDURE — 11000001 HC ACUTE MED/SURG PRIVATE ROOM

## 2022-07-20 RX ORDER — TRAMADOL HYDROCHLORIDE 50 MG/1
50 TABLET ORAL EVERY 6 HOURS PRN
Status: DISCONTINUED | OUTPATIENT
Start: 2022-07-20 | End: 2022-07-21

## 2022-07-20 RX ADMIN — CHLORHEXIDINE GLUCONATE 0.12% ORAL RINSE 10 ML: 1.2 LIQUID ORAL at 08:07

## 2022-07-20 RX ADMIN — MEMANTINE 10 MG: 10 TABLET ORAL at 09:07

## 2022-07-20 RX ADMIN — HYDROCODONE BITARTRATE AND ACETAMINOPHEN 1 TABLET: 5; 325 TABLET ORAL at 05:07

## 2022-07-20 RX ADMIN — INSULIN ASPART 2 UNITS: 100 INJECTION, SOLUTION INTRAVENOUS; SUBCUTANEOUS at 12:07

## 2022-07-20 RX ADMIN — DOCUSATE SODIUM 100 MG: 100 CAPSULE, LIQUID FILLED ORAL at 09:07

## 2022-07-20 RX ADMIN — DOCUSATE SODIUM 100 MG: 100 CAPSULE, LIQUID FILLED ORAL at 08:07

## 2022-07-20 RX ADMIN — Medication 1000 UNITS: at 08:07

## 2022-07-20 RX ADMIN — HYDROCODONE BITARTRATE AND ACETAMINOPHEN 1 TABLET: 5; 325 TABLET ORAL at 02:07

## 2022-07-20 RX ADMIN — MEMANTINE 10 MG: 10 TABLET ORAL at 08:07

## 2022-07-20 RX ADMIN — HYDROCODONE BITARTRATE AND ACETAMINOPHEN 1 TABLET: 5; 325 TABLET ORAL at 10:07

## 2022-07-20 RX ADMIN — QUETIAPINE FUMARATE 25 MG: 25 TABLET ORAL at 09:07

## 2022-07-20 RX ADMIN — CEFAZOLIN SODIUM 2 G: 2 SOLUTION INTRAVENOUS at 09:07

## 2022-07-20 RX ADMIN — RALOXIFENE 60 MG: 60 TABLET ORAL at 08:07

## 2022-07-20 RX ADMIN — APIXABAN 2.5 MG: 2.5 TABLET, FILM COATED ORAL at 09:07

## 2022-07-20 RX ADMIN — HYDROCODONE BITARTRATE AND ACETAMINOPHEN 1 TABLET: 5; 325 TABLET ORAL at 09:07

## 2022-07-20 RX ADMIN — CHLORHEXIDINE GLUCONATE 0.12% ORAL RINSE 10 ML: 1.2 LIQUID ORAL at 09:07

## 2022-07-20 RX ADMIN — PRAVASTATIN SODIUM 40 MG: 20 TABLET ORAL at 08:07

## 2022-07-20 RX ADMIN — APIXABAN 2.5 MG: 2.5 TABLET, FILM COATED ORAL at 10:07

## 2022-07-20 RX ADMIN — CEFAZOLIN SODIUM 2 G: 2 SOLUTION INTRAVENOUS at 01:07

## 2022-07-20 NOTE — BRIEF OP NOTE
[facility] - Surgery (Hospital)  Brief Operative Note    SUMMARY     Surgery Date: 7/19/2022     Surgeon(s) and Role:     * Yulissa Marx MD - Primary    Assisting Surgeon: None    Pre-op Diagnosis:  Closed displaced spiral fracture of shaft of left femur, initial encounter [S72.342A]    Post-op Diagnosis:  Post-Op Diagnosis Codes:     * Closed displaced spiral fracture of shaft of left femur, initial encounter [S72.342A]    Procedure(s) (LRB):  ORIF, FRACTURE, FEMUR (Left)    Anesthesia: General    Operative Findings: spiral periprosthetic femur fracture    Estimated Blood Loss: 200mL    Estimated Blood Loss has been documented.         Specimens:   Specimen (24h ago, onward)            None          [id number]

## 2022-07-20 NOTE — TELEPHONE ENCOUNTER
----- Message from Yulissa Marx MD sent at 7/20/2022  1:53 PM CDT -----  OK to follow-up in 2 weeks with wound check then 4 weeks for an xray.  ----- Message -----  From: Stacie Dc MA  Sent: 7/20/2022   1:28 PM CDT  To: Yulissa Marx MD    Left [facility] 07/19/2022.  Okay to see Nahum?  ----- Message -----  From: Nahum David PA-C  Sent: 7/20/2022   8:39 AM CDT  To: Serena Desai - Ortho Trauma    This patient needs an ortho trauma clinic follow-up in 4 weeks

## 2022-07-20 NOTE — TELEPHONE ENCOUNTER
Appointments scheduled for patient prior to her hospital discharge.     Phoned son and daughter in law and informed them of her appointments.

## 2022-07-20 NOTE — TRANSFER OF CARE
Anesthesia Transfer of Care Note    Patient: Rosy Balderrama    Procedure(s) Performed: Procedure(s) (LRB):  ORIF, FRACTURE, FEMUR (Left)    Patient location: PACU    Anesthesia Type: general    Post pain: adequate analgesia    Post assessment: no apparent anesthetic complications    Post vital signs: stable    Level of consciousness: awake    Nausea/Vomiting: no nausea/vomiting    Complications: none    Transfer of care protocol was followed      Last vitals:   Visit Vitals  /71   Pulse 66   Temp 36.8 °C (98.3 °F)   Resp 18   Wt 72 kg (158 lb 12.8 oz)   SpO2 99%   Breastfeeding No   BMI 28.13 kg/m²

## 2022-07-20 NOTE — PLAN OF CARE
SEE EVAL FOR DETAILS.  PT DISPLAYS DEFICITS WITH ADL'S SKILLS, DECREASE FUNCTIONAL MOBILITY/ TRANSFER AS WELL AS DECREASE B UE STRENGTH/ENDURANCE. RECOMMEND: SNF

## 2022-07-20 NOTE — PLAN OF CARE
[facility] - Med Surg  Initial Discharge Assessment       Primary Care Provider: Maude Barrios MD    Admission Diagnosis: Fall [W19.XXXA]  Acute cystitis without hematuria [N30.00]  Left leg injury [S89.92XA]  Closed displaced spiral fracture of shaft of left femur, initial encounter [S72.342A]  Fall at home, initial encounter [W19.XXXA, Y92.009]    Admission Date: 7/19/2022  Expected Discharge Date:     Discharge Barriers Identified: None    Payor: MEDICARE / Plan: MEDICARE PART A & B / Product Type: Government /     Extended Emergency Contact Information  Primary Emergency Contact: Arben Balderrama   [place]  Mobile Phone: [phone]  Relation: Son  Secondary Emergency Contact: ArnelshireeneugenioChiquis   [place]  Mobile Phone: [phone]  Relation: Daughter    Discharge Plan A: Skilled Nursing Facility  Discharge Plan B: Skilled Nursing Facility      [facility] [address]   [address] [facility] [address]  Phone: [phone] Fax: [phone]    [facility]  for [place], [place] - [address] [facility]  [address] [facility] [address]  Phone: [phone] Fax: [phone]      Initial Assessment (most recent)     Adult Discharge Assessment - 07/20/22 0906        Discharge Assessment    Assessment Type Discharge Planning Assessment     Confirmed/corrected address, phone number and insurance Yes     Confirmed Demographics Correct on Facesheet     Source of Information patient;family     Communicated ART with patient/caregiver Yes     Reason For Admission hip fracture     Lives With child(dacia), adult     Facility Arrived From: Home     Do you expect to return to your current living situation? Yes     Do you have help at home or someone to help you manage your care at home? Yes     Who are your caregiver(s) and their phone number(s)? obie Lucero [phone]     Prior to hospitilization cognitive status: Unable  to Assess     Current cognitive status: Unable to Assess     Walking or Climbing Stairs Difficulty ambulation difficulty, requires equipment     Mobility Management Uses a rollator     Dressing/Bathing Difficulty bathing difficulty, assistance 1 person     Dressing/Bathing Management family assistance     Equipment Currently Used at Home cane, straight;walker, rolling;rollator;bedside commode;wheelchair;glucometer     Readmission within 30 days? No     Patient currently being followed by outpatient case management? No     Do you currently have service(s) that help you manage your care at home? No     Do you take prescription medications? Yes     Do you have prescription coverage? Yes     Do you have any problems affording any of your prescribed medications? No     Is the patient taking medications as prescribed? yes     Who is going to help you get home at discharge? Patient will need SNF placement     How do you get to doctors appointments? family or friend will provide     Are you on dialysis? No     Do you take coumadin? No     Discharge Plan A Skilled Nursing Facility     Discharge Plan B Skilled Nursing Facility     DME Needed Upon Discharge  none     Discharge Plan discussed with: Adult children     Discharge Barriers Identified None        Relationship/Environment    Name(s) of Who Lives With Patient Arben Balderrama               LING met with patient/Arben at the bedside to assess for discharge needs.  Patient currently lives with her son @ [address] [place] [id number].  Arben stated that his mother does have dementia.  Patient fell at home and had surgery this admission for the fracture.  Patient will need SNF placement at discharge.   CM provided Arben with a post acute list of area skilled nursing facilities.  Arben will review and call CM with preferences. LING provided a transitional care folder, information on advanced directives, information on pharmacy bedside delivery, and discharge planning  begins on admission with contact information for any needs/questions.

## 2022-07-20 NOTE — PT/OT/SLP EVAL
Occupational Therapy   Evaluation    Name: Rosy Balderrama  MRN: [id number]  Admitting Diagnosis:  Periprosthetic fracture of femur following total replacement of hip  Recent Surgery: Procedure(s) (LRB):  ORIF, FRACTURE, FEMUR (Left) 1 Day Post-Op    Recommendations:     Discharge Recommendations: nursing facility, skilled  Discharge Equipment Recommendations:  none  Barriers to discharge:  None    Assessment:     Rosy Balderrama is a 89 y.o. female with a medical diagnosis of Periprosthetic fracture of femur following total replacement of hip.  She presents with DEBILIOTY AND L LE TOE TOUCH WEIGHT BEARING. Performance deficits affecting function: weakness, impaired functional mobility, decreased safety awareness, impaired endurance, gait instability, decreased coordination, impaired balance, decreased upper extremity function, impaired self care skills, decreased lower extremity function.      Rehab Prognosis: Fair; patient would benefit from acute skilled OT services to address these deficits and reach maximum level of function.       Plan:     Patient to be seen 2 x/week to address the above listed problems via self-care/home management, therapeutic activities, therapeutic exercises  · Plan of Care Expires: 08/03/22  · Plan of Care Reviewed with: patient, son    Subjective     Chief Complaint: DEBILITY  LLE TOE TOUCH WEIGHT BEARING    Patient/Family Comments/goals:     Occupational Profile:  Living Environment: LIVES WITH SON  IN A 1 STORY HOUSE WITH NO STEPS TO ENTER AND 10-12 HOUR SITTIER  Previous level of function:  MOD(I) SHORT DISTANCE WITH AE  Roles and Routines: OCCUPATIONAL THERAPY  Equipment Used at Home:  none  Assistance upon Discharge:     Pain/Comfort:  · Pain Rating 1: 8/10  · Location - Side 1: Left    Patients cultural, spiritual, Worship conflicts given the current situation: no    Objective:     Communicated with: NURSE AND Epic CHART REVIEW prior to session.  Patient found HOB elevated with BEAN  drain, peripheral IV upon OT entry to room.    General Precautions: Standard, fall   Orthopedic Precautions:LLE toe touch weight bearing   Braces: N/A  Respiratory Status: Room air    Occupational Performance:    Bed Mobility:    · Patient completed Rolling/Turning to Right with moderate assistance and with leg lift  · Patient completed Scooting/Bridging with moderate assistance and with leg lift  · Patient completed Supine to Sit with moderate assistance and with leg lift  Activities of Daily Living:  · Grooming: moderate assistance HAIR GROOMING  · Upper Body Dressing: maximal assistance UE DRESSING  · Lower Body Dressing: total assistance JUVE/DOFF SOCKS    Cognitive/Visual Perceptual:  Cognitive/Psychosocial Skills:     -       Oriented to: Person and Place   -       Follows Commands/attention:Follows one-step commands  -       Communication: clear/fluent  -       Memory: UNABLE TO ASSESS  -       Safety awareness/insight to disability: impaired     Physical Exam:  Upper Extremity Range of Motion:     -       Right Upper Extremity: LIMITED PLOF SHOULDER FLEXION 70 DEGREES  -       Left Upper Extremity: LIMITED PLOF APPROX. SHOULDER FLEXION APPROX 60 DEGREES  Upper Extremity Strength:    -       Right Upper Extremity: MMT: 2+/5 GROSSLY  -       Left Upper Extremity: MMT: 2+/5 GROSSLY   Strength:    -       Right Upper Extremity: MMT: 2+/5 GROSSLY  -       Left Upper Extremity: MMT: 2+/5 GROSSLY    AMPAC 6 Click ADL:  AMPAC Total Score: 14    Treatment & Education:  O.T. EVAL COMPLETED. PT EDUCATED ON O.T. POC. PT DISPLAYED DEFICTS WITH ADL'S SKILLS, DECREASE FUNCTIONAL MOBILITY AND TRANSFER AS WELL AS DECREASE B UE STRENGTH/ENDURANCE. PT WILL CONTINUE TO BENEFIT FROM SKILLED O.T.. TX : PT POSITION WITH EATING ACTIVITY EOB . PT REQ SBA WITH SET UP WITH FEEDING TASK. SEE EVAL FOR DETAILS  Education:    Patient left sitting edge of bed with all lines intact, call button in reach, bed alarm on, NURSE notified and   SON present    GOALS:   Multidisciplinary Problems     Occupational Therapy Goals        Problem: Occupational Therapy    Goal Priority Disciplines Outcome Interventions   Occupational Therapy Goal     OT, PT/OT     Description: O.T. GOALS TO BE MET BY 8-3-22  PT WILL TOLERATE 1 SET X 15 REPS B UE ROM EXERCISE  MIN A WITH UE DRESSING  MIN A WITH BSC TRANSFERS                   History:     Past Medical History:   Diagnosis Date    *Atrial fibrillation     Allergy     Blood transfusion     Cataract     Diabetes mellitus type II     Hyperlipidemia     Osteoporosis        Past Surgical History:   Procedure Laterality Date    ANKLE SURGERY      broken ankle    BILATERAL SALPINGOOPHORECTOMY      BREAST BIOPSY      CARPAL TUNNEL RELEASE      CHOLECYSTECTOMY      COLONOSCOPY      ERCP      ESOPHAGOGASTRODUODENOSCOPY      FRACTURE SURGERY      KNEE CARTILAGE SURGERY      TOTAL ABDOMINAL HYSTERECTOMY      TOTAL HIP ARTHROPLASTY      TUBAL LIGATION         Time Tracking:     OT Date of Treatment: 07/20/22  OT Start Time: 0835  OT Stop Time: 0900  OT Total Time (min): 25 min    Billable Minutes:Evaluation 15 MINUTES  Self Care/Home Management  10 MINUTES    7/20/2022

## 2022-07-20 NOTE — PT/OT/SLP EVAL
Physical Therapy Evaluation    Patient Name:  Rosy Balderrama   MRN:  [id number]    Recommendations:     Discharge Recommendations:  nursing facility, skilled   Discharge Equipment Recommendations: none   Barriers to discharge: Decreased caregiver support    Assessment:     Rosy Balderrama is a 89 y.o. female admitted with a medical diagnosis of Periprosthetic fracture of femur following total replacement of hip.  She presents with the following impairments/functional limitations:  weakness, impaired endurance, impaired self care skills, impaired functional mobility, gait instability, impaired balance, pain, decreased safety awareness, decreased lower extremity function, decreased upper extremity function, decreased coordination, impaired cognition, decreased ROM, impaired skin, edema, orthopedic precautions .    Rehab Prognosis: Good; patient would benefit from acute skilled PT services to address these deficits and reach maximum level of function.    Recent Surgery: Procedure(s) (LRB):  ORIF, FRACTURE, FEMUR (Left) 1 Day Post-Op    Plan:     During this hospitalization, patient to be seen 3 x/week to address the identified rehab impairments via gait training, therapeutic activities, therapeutic exercises, wheelchair management/training and progress toward the following goals:    · Plan of Care Expires:  08/03/22    Subjective     Chief Complaint: PAIN   Patient/Family Comments/goals: INC MOBILITY   Pain/Comfort:  · Pain Rating 1: 8/10  · Location - Side 1: Left  · Location 1: leg  · Pain Addressed 1: Reposition  · Pain Rating Post-Intervention 1: 8/10    Patients cultural, spiritual, Sabianist conflicts given the current situation:      Living Environment:  PT LIVES AT HOME WITH SON IN A ONE STORY HOME WITH NO STEPS TO ENTER . PT HAS SITTERS IN THE DAY 10-12 HOURS AS NEEDED   Prior to admission, patients level of function was S WITH GROSS FUNC MOBILITY.  Equipment used at home: rollator, wheelchair, bedside commode,  "hospital bed, shower chair.  DME owned (not currently used): none.  Upon discharge, patient will have assistance from FAMILY AND SITTER .    Objective:     Communicated with NURSE TEAGUE AND [facility] CHART REVIEW  prior to session.  Patient found supine with peripheral IV, BEAN drain  upon PT entry to room.    General Precautions: Standard, fall   Orthopedic Precautions:LLE toe touch weight bearing   Braces: N/A  Respiratory Status: Room air    Exams:  · RLE ROM: WFL  · RLE Strength: WFL  · LLE ROM: WFL ( AROM LIMITED D/T PAIN )  · LLE Strength: NA D/T PAIN    Functional Mobility:  · Bed Mobility:     · Rolling Left:  moderate assistance and of 2 persons  · Supine to Sit: moderate assistance and of 2 persons    Therapeutic Activities and Exercises:   PT SEATED EOB AND P.T. TECH BROUGHT PILLOWS FOR PROPPING PT TO ROOM. PT PROPPED FOR SUPPORT AND COMPLETE AP AND TKE X 6 REPS B LE WITH INC PAIN OF L LE. PT SET UP WITH BREAKFAST FOR EATING. PT AND SON EDUCATED ON ROLE OF P.T. AND FOR PT TO SIT TO TOLERANCE AND  ON RISK FOR FALLS DUE TO GENERALIZED WEAKNESS, EDUCATED ON "CALL DON'T FALL", ENCOURAGED TO CALL FOR ASSISTANCE WITH ALL NEEDS SUCH AS BED<>CHAIR TRANSFERS OR TRIPS TO BATHROOM. PT SEATED WITH SON PRESENT       AM-PAC 6 CLICK MOBILITY  Total Score:8     Patient left sitting edge of bed with call button in reach, bed alarm on and SON  present.    GOALS:   Multidisciplinary Problems     Physical Therapy Goals        Problem: Physical Therapy    Goal Priority Disciplines Outcome Goal Variances Interventions   Physical Therapy Goal     PT, PT/OT      Description: LT/3/22  1. PT WILL COMPLETE BED MOBILITY WITH MOD A  2. PT WILL STAND TTWB WITH RW AND MAX A  3. PT WILL COMPLETE B LE TE X 10 REPS FOR INC ROM AND STRENGTHENING  4. PT WILL COMPLETE STAND PIVOT T/F TO CHAIR WITH TTWB AND MAX A                   History:     Past Medical History:   Diagnosis Date    *Atrial fibrillation     Allergy     Blood transfusion  "    Cataract     Diabetes mellitus type II     Hyperlipidemia     Osteoporosis        Past Surgical History:   Procedure Laterality Date    ANKLE SURGERY      broken ankle    BILATERAL SALPINGOOPHORECTOMY      BREAST BIOPSY      CARPAL TUNNEL RELEASE      CHOLECYSTECTOMY      COLONOSCOPY      ERCP      ESOPHAGOGASTRODUODENOSCOPY      FRACTURE SURGERY      KNEE CARTILAGE SURGERY      TOTAL ABDOMINAL HYSTERECTOMY      TOTAL HIP ARTHROPLASTY      TUBAL LIGATION         Time Tracking:     PT Received On: 07/20/22  PT Start Time: 0841     PT Stop Time: 0905  PT Total Time (min): 24 min     Billable Minutes: Evaluation 15 and Therapeutic Exercise 9      07/20/2022

## 2022-07-20 NOTE — ASSESSMENT & PLAN NOTE
7/20:  POD 1  Pt/ot on case  SNF placement needed   Case management on case  eliquis started for VTE prophylaxis

## 2022-07-20 NOTE — TELEPHONE ENCOUNTER
----- Message from Nahum David PA-C sent at 7/20/2022  8:39 AM CDT -----  This patient needs an ortho trauma clinic follow-up in 4 weeks

## 2022-07-20 NOTE — SUBJECTIVE & OBJECTIVE
Interval History: POD 1, status post operative fixation of left periprosthetic femur fracture. Eliquis started for VTE prophylaxis. Will need snf placement.     Complains of pain. Otherwise denies any other issues.     Review of Systems   Constitutional:  Negative for fatigue and fever.   HENT:  Negative for sinus pressure.    Eyes:  Negative for visual disturbance.   Respiratory:  Negative for shortness of breath.    Cardiovascular:  Negative for chest pain.   Gastrointestinal:  Negative for nausea and vomiting.   Genitourinary:  Negative for difficulty urinating.   Musculoskeletal:  Positive for arthralgias (left hip pain). Negative for back pain.   Skin:  Negative for rash.   Neurological:  Negative for headaches.   Psychiatric/Behavioral:  Negative for confusion.    Objective:     Vital Signs (Most Recent):  Temp: 99.6 °F (37.6 °C) (07/20/22 0746)  Pulse: 83 (07/20/22 0746)  Resp: 16 (07/20/22 1016)  BP: (!) 121/56 (07/20/22 1016)  SpO2: (!) 93 % (07/20/22 0746)   Vital Signs (24h Range):  Temp:  [97.9 °F (36.6 °C)-99.6 °F (37.6 °C)] 99.6 °F (37.6 °C)  Pulse:  [66-90] 83  Resp:  [12-26] 16  SpO2:  [92 %-100 %] 93 %  BP: ()/(53-92) 121/56     Weight: 63.7 kg (140 lb 6.9 oz)  Body mass index is 27.43 kg/m².    Intake/Output Summary (Last 24 hours) at 7/20/2022 1053  Last data filed at 7/20/2022 0601  Gross per 24 hour   Intake 2447.24 ml   Output 1175 ml   Net 1272.24 ml      Physical Exam  Constitutional:       General: She is not in acute distress.     Appearance: She is well-developed. She is not diaphoretic.   HENT:      Head: Normocephalic and atraumatic.   Eyes:      Pupils: Pupils are equal, round, and reactive to light.   Cardiovascular:      Rate and Rhythm: Normal rate and regular rhythm.      Heart sounds: Normal heart sounds. No murmur heard.    No friction rub. No gallop.   Pulmonary:      Effort: Pulmonary effort is normal. No respiratory distress.      Breath sounds: Normal breath sounds. No  stridor. No wheezing or rales.   Abdominal:      General: Bowel sounds are normal. There is no distension.      Palpations: Abdomen is soft. There is no mass.      Tenderness: There is no abdominal tenderness. There is no guarding.   Musculoskeletal:      Comments: Left lower extremity shortened and externally rotated   Skin:     General: Skin is warm.      Findings: No erythema.   Neurological:      Mental Status: She is alert. Mental status is at baseline.       Significant Labs: All pertinent labs within the past 24 hours have been reviewed.    Significant Imaging: I have reviewed all pertinent imaging results/findings within the past 24 hours.

## 2022-07-20 NOTE — ANESTHESIA POSTPROCEDURE EVALUATION
Anesthesia Post Evaluation    Patient: Rosy Balderrama    Procedure(s) Performed: Procedure(s) (LRB):  ORIF, FRACTURE, FEMUR (Left)    Final Anesthesia Type: general      Patient location during evaluation: PACU  Patient participation: Yes- Able to Participate  Level of consciousness: awake and alert  Post-procedure vital signs: reviewed and stable  Pain management: adequate  Airway patency: patent    PONV status at discharge: No PONV  Anesthetic complications: no      Cardiovascular status: blood pressure returned to baseline  Respiratory status: unassisted  Hydration status: euvolemic  Follow-up not needed.          Vitals Value Taken Time   /92 07/19/22 2118   Temp 36.8 °C (98.3 °F) 07/19/22 1300   Pulse 79 07/19/22 2119   Resp 17 07/19/22 2119   SpO2 100 % 07/19/22 2119   Vitals shown include unvalidated device data.      No case tracking events are documented in the log.      Pain/Stephy Score: Pain Rating Prior to Med Admin: 10 (7/19/2022 11:34 AM)

## 2022-07-20 NOTE — OP NOTE
DATE OF PROCEDURE: 07/19/2022    PREOPERATIVE DIAGNOSIS:  Closed displaced spiral fracture of shaft of left femur, initial encounter [S72.342A]    POSTOPERATIVE DIAGNOSIS:  1. Left Montalba B1 periprosthetic femur fracture  2. Type 2 diabetes mellitus  3. Chronic Anemia  4. Osteoporosis    PROCEDURES PERFORMED: Operative fixation of  Left periprosthetic femur fracture    SURGEON:  Yulissa Marx MD    ASSISTANT:  Ugo love     ANESTHESIA: General    SPECIMENS: none    URINE: 400 mL    FLUID: 1700 mL of crystalloid and 2 units of PRBC    BLOOD LOSS: 200mL    DRAINS: 10 Fr BEAN drain    IMPLANTS:   Implant Name Type Inv. Item Serial No.  Lot No. LRB No. Used Action   PLUG AXSOS 3 TI CABLE 5.0MM - [id number]  PLUG AXSOS 3 TI CABLE 5.0MM  [employer]. [id number] Left 3 Implanted   KWIRE DRILL TIP [id number] - [id number]  KWIRE DRILL TIP [id number]  [employer].  Left 2 Implanted and Explanted   SCREW AXSOS GENEVIEVE ST 4.5X50MM - [id number]  SCREW AXSOS GENEVIEVE ST 4.5X50MM  [employer].  Left 1 Implanted and Explanted   SCREW AXSOS CORTEX TI 4.5X80MM - [id number]  SCREW AXSOS CORTEX TI 4.5X80MM  [employer].  Left 1 Implanted and Explanted   SCREW AXSOS GENEVIEVE ST 4.5X40MM - [id number]  SCREW AXSOS GENEVIEVE ST 4.5X40MM  [employer].  Left 1 Implanted   SCREW AXSOS LOCKING 5X75MM - [id number]  SCREW AXSOS LOCKING 5X75MM  [employer].  Left 3 Implanted   SCREW AXSOS LOCKING 5X80MM - [id number]  SCREW AXSOS LOCKING 5X80MM  [employer].  Left 1 Implanted   16 H DIST LAT FEMUR      Left 1 Implanted   SCREW AXSOS CORTEX TI 4.5X 48 MM NON-LOCKING      Left 1 Implanted   SCREW AXSOS CORTEX TI 4.5X 36MM NON LOCKING      Left 1 Implanted       COMPLICATIONS: none    INDICATIONS:  Patient is a very pleasant 89-year-old  female with a history of dementia who sustained a ground level fall onto her left side on 7/19/2022.  She had immediate pain obvious deformity of the limb and inability  to ambulate.  Patient lives with her son who is her primary caregiver.  Patient is status post a left total hip arthroplasty performed many years ago.   Patient family would like to proceed with operative fixation of her left femoral shaft fracture.  They understand inherent risks and benefits of the procedure have elected to proceed.    PROCEDURE IN DETAIL:     After consents were verified and site was marked, patient was transported to the operative suite where appropriate anesthesia was administered.     Patient was then prepped and draped in normal sterile fashion.  Time-out performed verifying the procedure location antibiotic administration.  An extensile a linear incision was made from the distal femur all the way proximally to the lesser troch.  Full-thickness skin flaps were raised down to the ITB band which was incised.  The vastus lateralis muscle was retracted anteriorly to reveal the fracture site.  Care was taken to respect neurovascular structures.  A long spiral fracture was identified.  Provisional reduction was performed using a radiolucent triangle along with towels.  A 16 hole Exos plate was chosen.  Position of the plate was verified in AP and lateral plane distally and K-wires were placed provisionally.  The position of the plate was also verified proximally under fluoroscopic guidance.  Bicortical screws were placed in the metaphyseal region to reduce the plate to the bone.  A 2nd screw was placed just proximal to that.  Reduction of the fracture was verified again in the AP and lateral plane.  Attention was turned proximally a cable was passed around the fracture site as well as the plate.  This was provisionally tightened to verify reduction.  Again reduction was confirmed in AP and lateral plane.  The remaining distal locking screws were placed.  Attention then was turned back proximally where the 1st cable was crimped and cut.  Three more additional cables were placed to ensure excellent  fixation.  Reduction again was verified in AP and lateral plane.  Final films were taken.  The wound was copiously irrigated with normal saline.  Marcaine and Exparel were mixed and infiltrated in the soft tissues as well as in the periosteum to help with pain control.  A BEAN drain was placed deep to the ITB band to help with hematoma formation.  The ITB band was closed with 1. Vicryl.  0 Vicryl then 2-0 Vicryl used to close the remaining subcutaneous tissues.  A 2-0 nylon suture was used on the skin.  The wound was dressed with an Aquacel island dressing.  Patient emerged from anesthesia without complications.      POST OP PLAN:  · Drain:  Drain will remain in place until the output is less than 25 cc per shift for 2 consecutive shifts.  · Dressings:  Current dressings will will remain in place for 5 days.  On postoperative day 5, Aquacel dressing may be removed and the wound can be cleaned with Betadine.  A new Aquacel dressing with the need to be applied for another 5-7 days.  · Follow-up:  Patient will need to follow up in the Orthopedic Clinic in approximately 2 weeks for a wound check and possible suture removal.  Depending on her skin looks, she may need to have the sutures in for a full 21 days.  She will need x-rays at 4 weeks.  · Weight bearing:  Patient will be toe-touch weight-bearing for at least 4 weeks; then she will proceed to weight-bearing as tolerated depending on bony healing noted on her x-rays.

## 2022-07-20 NOTE — PROGRESS NOTES
[facility]  Orthopedics  Progress Note    Patient Name: Rosy Balderrama  MRN: [id number]  Admission Date: 7/19/2022  Hospital Length of Stay: 1 days  Attending Provider: Boyd Fields MD  Primary Care Provider: Maude Barrios MD  Follow-up For: Procedure(s) (LRB):  ORIF, FRACTURE, FEMUR (Left)    Post-Operative Day: 1 Day Post-Op  Subjective:     Principal Problem:Periprosthetic fracture of femur following total replacement of hip    Principal Orthopedic Problem: Periprosthetic fracture of femur following total replacement of hip    Interval History: Rosy Balderrama is 89-year-old female postop day 1 status post operative fixation of left periprosthetic femur fracture. Patient resting in bed.  She does complain is soreness both at the surgical site her back lying bed.  No acute events overnight    Review of patient's allergies indicates:   Allergen Reactions    Alendronate Other (See Comments)    Atorvastatin Other (See Comments)    Ciprofloxacin Other (See Comments)    Codeine Itching    Diazepam Itching    Ibuprofen Other (See Comments)    Oxycodone-acetaminophen Other (See Comments) and Itching    Rofecoxib Other (See Comments)    Seldane     Simvastatin Other (See Comments)       Current Facility-Administered Medications   Medication    0.9%  NaCl infusion    acetaminophen tablet 650 mg    bumetanide tablet 1 mg    cefazolin (ANCEF) 2 gram in dextrose 5% 50 mL IVPB (premix)    chlorhexidine 0.12 % solution 10 mL    dextrose 10% bolus 125 mL    dextrose 10% bolus 250 mL    docusate sodium capsule 100 mg    enoxaparin injection 40 mg    glucagon (human recombinant) injection 1 mg    HYDROcodone-acetaminophen 5-325 mg per tablet 1 tablet    insulin aspart U-100 pen 0-5 Units    memantine tablet 10 mg    metoprolol succinate (TOPROL-XL) 24 hr tablet 25 mg    ondansetron disintegrating tablet 4 mg    pravastatin tablet 40 mg    QUEtiapine tablet 25 mg    raloxifene tablet 60 mg    sodium  chloride 0.9% flush 10 mL    sodium chloride 0.9% flush 10 mL    sodium chloride 0.9% flush 10 mL    traMADoL tablet 50 mg    vitamin D 1000 units tablet 1,000 Units     Objective:     Vital Signs (Most Recent):  Temp: 99.6 °F (37.6 °C) (07/20/22 0746)  Pulse: 83 (07/20/22 0746)  Resp: 16 (07/20/22 0746)  BP: (!) 94/55 (07/20/22 0746)  SpO2: (!) 93 % (07/20/22 0746) Vital Signs (24h Range):  Temp:  [97.9 °F (36.6 °C)-99.6 °F (37.6 °C)] 99.6 °F (37.6 °C)  Pulse:  [54-90] 83  Resp:  [12-26] 16  SpO2:  [92 %-100 %] 93 %  BP: ()/(53-92) 94/55     Weight: 63.7 kg (140 lb 6.9 oz)  Height: 5' (152.4 cm)  Body mass index is 27.43 kg/m².      Intake/Output Summary (Last 24 hours) at 7/20/2022 0822  Last data filed at 7/20/2022 0601  Gross per 24 hour   Intake 2447.24 ml   Output 1175 ml   Net 1272.24 ml       Ortho/SPM Exam   Left lower extremity:  Dressing is clean, dry, and intact  Drain is intact with minimal output  Mild edema  Mild TTP  Hip ROM decreased secondary to pain  Calf and compartments are soft and compressible  Motor exam normal  Sensation and pulses intact    GEN: Well developed, well nourished female. AAOX3. No acute distress.   Normocephalic, atraumatic.   MARY  Breathing unlabored.  Mood and affect appropriate.      Significant Labs:   BMP:   Recent Labs   Lab 07/19/22  1132   *      K 4.3   *   CO2 24   BUN 36*   CREATININE 1.1   CALCIUM 9.1     CBC:   Recent Labs   Lab 07/19/22  1132 07/19/22  2245 07/20/22  0606   WBC 12.83* 12.27 11.11   HGB 8.8* 9.1* 8.1*   HCT 27.7* 27.0* 24.3*    165 170     POCT Glucose:   Recent Labs   Lab 07/19/22  1332 07/19/22  2241 07/20/22  0522   POCTGLUCOSE 193* 186* 161*     All pertinent labs within the past 24 hours have been reviewed.    Significant Imaging: I have reviewed and interpreted all pertinent imaging results/findings.    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  Current dressings will will remain in place for 5 days.  On postoperative day 5, Aquacel dressing may be removed and the wound can be cleaned with Betadine.  A new Aquacel dressing with the need to be applied for another 5-7 days.  Patient will need to follow up in the Orthopedic Clinic in approximately 2 weeks for a wound check and possible suture removal.  Depending on her skin looks, she may need to have the sutures in for a full 21 days. She will need x-rays at 4 weeks.  Patient will be toe-touch weight-bearing for at least 4 weeks; then she will proceed to weight-bearing as tolerated depending on bony healing noted on her x-rays.      Nahum David PA-C  Orthopedics  [facility]

## 2022-07-20 NOTE — PROGRESS NOTES
[facility] Medicine  Progress Note    Patient Name: Rosy Balderrama  MRN: [id number]  Patient Class: IP- Inpatient   Admission Date: 7/19/2022  Length of Stay: 1 days  Attending Physician: Boyd Fields MD  Primary Care Provider: Maude Barrios MD        Subjective:     Principal Problem:Periprosthetic fracture of femur following total replacement of hip        HPI:  Patient is a 89-year-old  female with past medical history of dementia, AFib, diabetes, hyperlipidemia who presents to the ED today after suffering a fall this morning.  History obtained via son due to patient's history of dementia.  When questioned on what happened, patient stated I am not sure ask my children.  Patient suffered a fall after going to the bathroom this a.m..  She was then taken to the ED.    In the ED, x-rays reveal spiral fracture left proximal femur with significant displacement.  She was given 1 dose of Rocephin for UTI.  Ortho notified. Hospital Medicine consulted and patient admitted.       Overview/Hospital Course:  7/20: POD 1, status post operative fixation of left periprosthetic femur fracture. Eliquis started for VTE prophylaxis. Will need snf placement.       Interval History: POD 1, status post operative fixation of left periprosthetic femur fracture. Eliquis started for VTE prophylaxis. Will need snf placement.     Complains of pain. Otherwise denies any other issues.     Review of Systems   Constitutional:  Negative for fatigue and fever.   HENT:  Negative for sinus pressure.    Eyes:  Negative for visual disturbance.   Respiratory:  Negative for shortness of breath.    Cardiovascular:  Negative for chest pain.   Gastrointestinal:  Negative for nausea and vomiting.   Genitourinary:  Negative for difficulty urinating.   Musculoskeletal:  Positive for arthralgias (left hip pain). Negative for back pain.   Skin:  Negative for rash.   Neurological:  Negative for headaches.   Psychiatric/Behavioral:   Negative for confusion.    Objective:     Vital Signs (Most Recent):  Temp: 99.6 °F (37.6 °C) (07/20/22 0746)  Pulse: 83 (07/20/22 0746)  Resp: 16 (07/20/22 1016)  BP: (!) 121/56 (07/20/22 1016)  SpO2: (!) 93 % (07/20/22 0746)   Vital Signs (24h Range):  Temp:  [97.9 °F (36.6 °C)-99.6 °F (37.6 °C)] 99.6 °F (37.6 °C)  Pulse:  [66-90] 83  Resp:  [12-26] 16  SpO2:  [92 %-100 %] 93 %  BP: ()/(53-92) 121/56     Weight: 63.7 kg (140 lb 6.9 oz)  Body mass index is 27.43 kg/m².    Intake/Output Summary (Last 24 hours) at 7/20/2022 1053  Last data filed at 7/20/2022 0601  Gross per 24 hour   Intake 2447.24 ml   Output 1175 ml   Net 1272.24 ml      Physical Exam  Constitutional:       General: She is not in acute distress.     Appearance: She is well-developed. She is not diaphoretic.   HENT:      Head: Normocephalic and atraumatic.   Eyes:      Pupils: Pupils are equal, round, and reactive to light.   Cardiovascular:      Rate and Rhythm: Normal rate and regular rhythm.      Heart sounds: Normal heart sounds. No murmur heard.    No friction rub. No gallop.   Pulmonary:      Effort: Pulmonary effort is normal. No respiratory distress.      Breath sounds: Normal breath sounds. No stridor. No wheezing or rales.   Abdominal:      General: Bowel sounds are normal. There is no distension.      Palpations: Abdomen is soft. There is no mass.      Tenderness: There is no abdominal tenderness. There is no guarding.   Musculoskeletal:      Comments: Left lower extremity shortened and externally rotated   Skin:     General: Skin is warm.      Findings: No erythema.   Neurological:      Mental Status: She is alert. Mental status is at baseline.       Significant Labs: All pertinent labs within the past 24 hours have been reviewed.    Significant Imaging: I have reviewed all pertinent imaging results/findings within the past 24 hours.      Assessment/Plan:      * Periprosthetic fracture of femur following total replacement of  hip  7/20:  POD 1  Pt/ot on case  SNF placement needed   Case management on case  eliquis started for VTE prophylaxis      Closed left femoral fracture  Displaced proximal femoral fracture  Norco, morphine p.r.n. for pain  Ortho consult on case  NPO at midnight in the event surgery can be performed tomorrow    Advance Care Planning     Discussed case with son Arben who is power of .  States that he does not know his mother's wishes regarding code status.  Will continue full code for now.         Osteoporosis  Resume home meds      Paroxysmal atrial fibrillation  Continue home metoprolol  Patient not on anticoagulation  No history of bleeding reported  Demar Vasc score 4  Will likely need short term OAC VTE prophylaxis from Orthopedic surgery  Will defer long term anticoagulation to PCP      Hyperlipidemia  Resume statin    Diabetes mellitus type 2, controlled, without complications  Diabetic diet  Sliding scale  Hold home diabetic meds        VTE Risk Mitigation (From admission, onward)         Ordered     apixaban tablet 2.5 mg  2 times daily         07/20/22 0908     Place KIA hose  Until discontinued         07/19/22 2132     Place sequential compression device  Until discontinued         07/19/22 2132     IP VTE HIGH RISK PATIENT  Once         07/19/22 2132                Discharge Planning   ART:      Code Status: Full Code   Is the patient medically ready for discharge?:     Reason for patient still in hospital (select all that apply): Patient trending condition  Discharge Plan A: Skilled Nursing Facility                  Boyd Fields MD  Department of Hospital Medicine   [facility]

## 2022-07-20 NOTE — PLAN OF CARE
Problem: Adult Inpatient Plan of Care  Goal: Plan of Care Review  Outcome: Ongoing, Progressing  Goal: Patient-Specific Goal (Individualized)  Outcome: Ongoing, Progressing  Goal: Absence of Hospital-Acquired Illness or Injury  Outcome: Ongoing, Progressing  Goal: Optimal Comfort and Wellbeing  Outcome: Ongoing, Progressing  Goal: Readiness for Transition of Care  Outcome: Ongoing, Progressing     Problem: Diabetes Comorbidity  Goal: Blood Glucose Level Within Targeted Range  Outcome: Ongoing, Progressing     Problem: Skin Injury Risk Increased  Goal: Skin Health and Integrity  Outcome: Ongoing, Progressing     Problem: Fall Injury Risk  Goal: Absence of Fall and Fall-Related Injury  Outcome: Ongoing, Progressing     Problem: Infection  Goal: Absence of Infection Signs and Symptoms  Outcome: Ongoing, Progressing

## 2022-07-20 NOTE — HOSPITAL COURSE
7/20: POD 1, status post operative fixation of left periprosthetic femur fracture. Eliquis started for VTE prophylaxis. Will need snf placement.   7/21: POD 2, status post operative fixation of left periprosthetic femur fracture. Cont eliquis. Hgb 6.9 this am. Not suspecting active bleed. Likely attributed to expected blood loss from surgery. Due to critical blood shortage, will not transfuse at this time as it is not emergent. Vital signs stable. Will repeat h/h this afternoon, may consider transfusing at that time if h/h continues to trend down.   7/22: POD 3, 1 unit prbc today. Hgb 6.3. SNF placement pending.     7/23: Pending SNF placement, ordered repeat CBC, vitals stable.   7/25: Postop day 6 status postoperative fixation of left periprosthetic femur fracture---awaiting placement  7/26: Postop day 7, patient still reports 10/10 pain but does not appear in any distress when seen this morning, comfortably eating breakfast. Continue to work with PT/OT. Awaiting SNF placement.  7/27: Postop day 8, patient resting comfortably and in no acute distress. Reports pain is well controlled this morning. Continue to work with PT/OT. Medically stable for transfer to SNF. Follow-up with Orthopedic Surgeon in 1-2 weeks. Patient clinically improved and medically stable for discharge to SNF with instructions to follow-up with PCP in 1-2 weeks.    Patient and/or family was seen on day of discharge by myself and was examined. They were stable for discharge. Discharge plan, follow up instructions, and contacts in case of further needs were discussed in detail.

## 2022-07-21 PROBLEM — D64.9 ANEMIA: Status: ACTIVE | Noted: 2022-07-21

## 2022-07-21 LAB
ANION GAP SERPL CALC-SCNC: 8 MMOL/L (ref 8–16)
BASOPHILS # BLD AUTO: 0 K/UL (ref 0–0.2)
BASOPHILS NFR BLD: 0 % (ref 0–1.9)
BUN SERPL-MCNC: 22 MG/DL (ref 8–23)
CALCIUM SERPL-MCNC: 7.9 MG/DL (ref 8.7–10.5)
CHLORIDE SERPL-SCNC: 113 MMOL/L (ref 95–110)
CO2 SERPL-SCNC: 20 MMOL/L (ref 23–29)
CREAT SERPL-MCNC: 0.8 MG/DL (ref 0.5–1.4)
DIFFERENTIAL METHOD: ABNORMAL
EOSINOPHIL # BLD AUTO: 0.2 K/UL (ref 0–0.5)
EOSINOPHIL NFR BLD: 1.8 % (ref 0–8)
ERYTHROCYTE [DISTWIDTH] IN BLOOD BY AUTOMATED COUNT: 17.5 % (ref 11.5–14.5)
EST. GFR  (AFRICAN AMERICAN): >60 ML/MIN/1.73 M^2
EST. GFR  (NON AFRICAN AMERICAN): >60 ML/MIN/1.73 M^2
GLUCOSE SERPL-MCNC: 203 MG/DL (ref 70–110)
HCT VFR BLD AUTO: 20 % (ref 37–48.5)
HCT VFR BLD AUTO: 22.2 % (ref 37–48.5)
HGB BLD-MCNC: 6.5 G/DL (ref 12–16)
HGB BLD-MCNC: 6.9 G/DL (ref 12–16)
IMM GRANULOCYTES # BLD AUTO: 0.09 K/UL (ref 0–0.04)
IMM GRANULOCYTES NFR BLD AUTO: 1 % (ref 0–0.5)
LYMPHOCYTES # BLD AUTO: 0.6 K/UL (ref 1–4.8)
LYMPHOCYTES NFR BLD: 6.2 % (ref 18–48)
MCH RBC QN AUTO: 33 PG (ref 27–31)
MCHC RBC AUTO-ENTMCNC: 31.1 G/DL (ref 32–36)
MCV RBC AUTO: 106 FL (ref 82–98)
MONOCYTES # BLD AUTO: 0.5 K/UL (ref 0.3–1)
MONOCYTES NFR BLD: 5 % (ref 4–15)
NEUTROPHILS # BLD AUTO: 7.7 K/UL (ref 1.8–7.7)
NEUTROPHILS NFR BLD: 86 % (ref 38–73)
NRBC BLD-RTO: 0 /100 WBC
PLATELET # BLD AUTO: 128 K/UL (ref 150–450)
PMV BLD AUTO: 10.9 FL (ref 9.2–12.9)
POCT GLUCOSE: 143 MG/DL (ref 70–110)
POCT GLUCOSE: 207 MG/DL (ref 70–110)
POCT GLUCOSE: 212 MG/DL (ref 70–110)
POTASSIUM SERPL-SCNC: 4.3 MMOL/L (ref 3.5–5.1)
RBC # BLD AUTO: 2.09 M/UL (ref 4–5.4)
SARS-COV-2 RDRP RESP QL NAA+PROBE: NEGATIVE
SODIUM SERPL-SCNC: 141 MMOL/L (ref 136–145)
WBC # BLD AUTO: 8.93 K/UL (ref 3.9–12.7)

## 2022-07-21 PROCEDURE — 94760 N-INVAS EAR/PLS OXIMETRY 1: CPT

## 2022-07-21 PROCEDURE — 36415 COLL VENOUS BLD VENIPUNCTURE: CPT | Performed by: FAMILY MEDICINE

## 2022-07-21 PROCEDURE — 85018 HEMOGLOBIN: CPT | Performed by: FAMILY MEDICINE

## 2022-07-21 PROCEDURE — 11000001 HC ACUTE MED/SURG PRIVATE ROOM

## 2022-07-21 PROCEDURE — 97530 THERAPEUTIC ACTIVITIES: CPT

## 2022-07-21 PROCEDURE — 85025 COMPLETE CBC W/AUTO DIFF WBC: CPT | Performed by: FAMILY MEDICINE

## 2022-07-21 PROCEDURE — U0002 COVID-19 LAB TEST NON-CDC: HCPCS | Performed by: FAMILY MEDICINE

## 2022-07-21 PROCEDURE — 85014 HEMATOCRIT: CPT | Performed by: FAMILY MEDICINE

## 2022-07-21 PROCEDURE — 94761 N-INVAS EAR/PLS OXIMETRY MLT: CPT

## 2022-07-21 PROCEDURE — 25000003 PHARM REV CODE 250: Performed by: FAMILY MEDICINE

## 2022-07-21 PROCEDURE — 63600175 PHARM REV CODE 636 W HCPCS: Performed by: FAMILY MEDICINE

## 2022-07-21 PROCEDURE — 25000003 PHARM REV CODE 250: Performed by: ORTHOPAEDIC SURGERY

## 2022-07-21 PROCEDURE — 97110 THERAPEUTIC EXERCISES: CPT

## 2022-07-21 PROCEDURE — 80048 BASIC METABOLIC PNL TOTAL CA: CPT | Performed by: FAMILY MEDICINE

## 2022-07-21 RX ORDER — OXYCODONE AND ACETAMINOPHEN 7.5; 325 MG/1; MG/1
1 TABLET ORAL EVERY 4 HOURS PRN
Status: DISCONTINUED | OUTPATIENT
Start: 2022-07-21 | End: 2022-07-22

## 2022-07-21 RX ORDER — OXYCODONE AND ACETAMINOPHEN 10; 325 MG/1; MG/1
1 TABLET ORAL EVERY 4 HOURS PRN
Status: DISCONTINUED | OUTPATIENT
Start: 2022-07-21 | End: 2022-07-22

## 2022-07-21 RX ADMIN — TRAMADOL HYDROCHLORIDE 50 MG: 50 TABLET ORAL at 08:07

## 2022-07-21 RX ADMIN — OXYCODONE AND ACETAMINOPHEN 1 TABLET: 7.5; 325 TABLET ORAL at 07:07

## 2022-07-21 RX ADMIN — MEMANTINE 10 MG: 10 TABLET ORAL at 08:07

## 2022-07-21 RX ADMIN — CHLORHEXIDINE GLUCONATE 0.12% ORAL RINSE 10 ML: 1.2 LIQUID ORAL at 08:07

## 2022-07-21 RX ADMIN — DOCUSATE SODIUM 100 MG: 100 CAPSULE, LIQUID FILLED ORAL at 08:07

## 2022-07-21 RX ADMIN — INSULIN ASPART 2 UNITS: 100 INJECTION, SOLUTION INTRAVENOUS; SUBCUTANEOUS at 11:07

## 2022-07-21 RX ADMIN — APIXABAN 2.5 MG: 2.5 TABLET, FILM COATED ORAL at 08:07

## 2022-07-21 RX ADMIN — RALOXIFENE 60 MG: 60 TABLET ORAL at 08:07

## 2022-07-21 RX ADMIN — Medication 1000 UNITS: at 08:07

## 2022-07-21 RX ADMIN — OXYCODONE AND ACETAMINOPHEN 1 TABLET: 7.5; 325 TABLET ORAL at 11:07

## 2022-07-21 RX ADMIN — PRAVASTATIN SODIUM 40 MG: 20 TABLET ORAL at 08:07

## 2022-07-21 RX ADMIN — QUETIAPINE FUMARATE 25 MG: 25 TABLET ORAL at 08:07

## 2022-07-21 RX ADMIN — METOPROLOL SUCCINATE 25 MG: 25 TABLET, FILM COATED, EXTENDED RELEASE ORAL at 08:07

## 2022-07-21 NOTE — PLAN OF CARE
[facility]  Discharge Reassessment    Primary Care Provider: Maude Barrios MD    Expected Discharge Date:     Reassessment (most recent)     Discharge Reassessment - 07/21/22 1133        Discharge Reassessment    Assessment Type Discharge Planning Reassessment     Did the patient's condition or plan change since previous assessment? No     Discharge Plan discussed with: Adult children     Communicated ART with patient/caregiver Yes     Discharge Plan A Home with family;Home Health     Discharge Plan B Home Health;Home with family     DME Needed Upon Discharge  none     Discharge Barriers Identified None        Post-Acute Status    Post-Acute Authorization Home Health               CM met at bedside with Patient/Heidi/Arben (on phone) regarding the discharge plan.  Arben does not want SNF placement at this time.  Arben wants home health services.  Preferences given for 1. [facility]  2. [facility]  3. [facility].  Choice form completed and placed in the patient blue folder.  [facility]

## 2022-07-21 NOTE — ASSESSMENT & PLAN NOTE
7/21:  POD 1  Pt/ot on case  SNF placement needed   Case management on case  eliquis started for VTE prophylaxis  Pt complains of pain  Will change to po percocet prn

## 2022-07-21 NOTE — ASSESSMENT & PLAN NOTE
7/21:  hgb 6.9 this am  No active bleed suspected  Vital signs stable  Likely due to expected blood loss from surgery  Will not transfuse at this time due to critical blood shortage  Repeat h/h this afternoon

## 2022-07-21 NOTE — PLAN OF CARE
07/21/22 1552   Post-Acute Status   Post-Acute Authorization Placement   Post-Acute Placement Status Referrals Sent       CM spoke to Arben and Heidi regarding the discharge plan.  Arben has decided that SNF will be best for his mother.  Preference is for 1. [facility] and 2. [facility].  Choice form completed and referral made in [facility].     CM communicated with [facility] and they do not have a bed available.      CM communicated with  [facility] and they will review.

## 2022-07-21 NOTE — SUBJECTIVE & OBJECTIVE
Interval History: POD 2, status post operative fixation of left periprosthetic femur fracture. Cont eliquis. Hgb 6.9 this am. Not suspecting active bleed. Likely attributed to expected blood loss from surgery. Due to critical blood shortage, will not transfuse at this time as it is not emergent. Vital signs stable. Will repeat h/h this afternoon, may consider transfusing at that time if h/h continues to trend down.     Awaiting placement.    Review of Systems   Constitutional:  Negative for fatigue and fever.   HENT:  Negative for sinus pressure.    Eyes:  Negative for visual disturbance.   Respiratory:  Negative for shortness of breath.    Cardiovascular:  Negative for chest pain.   Gastrointestinal:  Negative for nausea and vomiting.   Genitourinary:  Negative for difficulty urinating.   Musculoskeletal:  Positive for arthralgias (left hip pain). Negative for back pain.   Skin:  Negative for rash.   Neurological:  Negative for headaches.   Psychiatric/Behavioral:  Negative for confusion.    Objective:     Vital Signs (Most Recent):  Temp: 99 °F (37.2 °C) (07/21/22 0707)  Pulse: 94 (07/21/22 0707)  Resp: 18 (07/21/22 0824)  BP: (!) 145/63 (07/21/22 0707)  SpO2: (!) 93 % (07/21/22 0824)   Vital Signs (24h Range):  Temp:  [97.8 °F (36.6 °C)-99.5 °F (37.5 °C)] 99 °F (37.2 °C)  Pulse:  [84-96] 94  Resp:  [16-18] 18  SpO2:  [91 %-96 %] 93 %  BP: ()/(48-63) 145/63     Weight: 65.6 kg (144 lb 10 oz)  Body mass index is 28.24 kg/m².    Intake/Output Summary (Last 24 hours) at 7/21/2022 1055  Last data filed at 7/21/2022 0559  Gross per 24 hour   Intake 1474.81 ml   Output 955 ml   Net 519.81 ml      Physical Exam  Constitutional:       General: She is not in acute distress.     Appearance: She is well-developed. She is not diaphoretic.   HENT:      Head: Normocephalic and atraumatic.   Eyes:      Pupils: Pupils are equal, round, and reactive to light.   Cardiovascular:      Rate and Rhythm: Normal rate and regular  rhythm.      Heart sounds: Normal heart sounds. No murmur heard.    No friction rub. No gallop.   Pulmonary:      Effort: Pulmonary effort is normal. No respiratory distress.      Breath sounds: Normal breath sounds. No stridor. No wheezing or rales.   Abdominal:      General: Bowel sounds are normal. There is no distension.      Palpations: Abdomen is soft. There is no mass.      Tenderness: There is no abdominal tenderness. There is no guarding.   Musculoskeletal:      Comments: Left lower extremity shortened and externally rotated   Skin:     General: Skin is warm.      Findings: No erythema.   Neurological:      Mental Status: She is alert. Mental status is at baseline.       Significant Labs: All pertinent labs within the past 24 hours have been reviewed.    Significant Imaging: I have reviewed all pertinent imaging results/findings within the past 24 hours.

## 2022-07-21 NOTE — PLAN OF CARE
Problem: Adult Inpatient Plan of Care  Goal: Plan of Care Review  7/21/2022 0313 by Claudette Harris RN  Outcome: Ongoing, Progressing  7/21/2022 0130 by Claudette Harris RN  Outcome: Ongoing, Progressing  Goal: Patient-Specific Goal (Individualized)  7/21/2022 0313 by Claudette Harris RN  Outcome: Ongoing, Progressing  7/21/2022 0130 by Claudette Harris RN  Outcome: Ongoing, Progressing  Goal: Absence of Hospital-Acquired Illness or Injury  7/21/2022 0313 by Claudette Harris RN  Outcome: Ongoing, Progressing  7/21/2022 0130 by Claudette Harris RN  Outcome: Ongoing, Progressing  Goal: Optimal Comfort and Wellbeing  7/21/2022 0313 by Claudette Harris RN  Outcome: Ongoing, Progressing  7/21/2022 0130 by Claudette Harris RN  Outcome: Ongoing, Progressing  Goal: Readiness for Transition of Care  7/21/2022 0313 by Claudette Harris RN  Outcome: Ongoing, Progressing  7/21/2022 0130 by Claudette Harris RN  Outcome: Ongoing, Progressing     Problem: Diabetes Comorbidity  Goal: Blood Glucose Level Within Targeted Range  7/21/2022 0313 by Claudette Harris RN  Outcome: Ongoing, Progressing  7/21/2022 0130 by Claudette Harris RN  Outcome: Ongoing, Progressing     Problem: Skin Injury Risk Increased  Goal: Skin Health and Integrity  7/21/2022 0313 by Claudette Harris RN  Outcome: Ongoing, Progressing  7/21/2022 0130 by Claudette Harris RN  Outcome: Ongoing, Progressing     Problem: Fall Injury Risk  Goal: Absence of Fall and Fall-Related Injury  7/21/2022 0313 by Claudette Harris RN  Outcome: Ongoing, Progressing  7/21/2022 0130 by Claudette Harris RN  Outcome: Ongoing, Progressing     Problem: Infection  Goal: Absence of Infection Signs and Symptoms  7/21/2022 0313 by Claudette Harris RN  Outcome: Ongoing, Progressing  7/21/2022 0130 by Claudette Harris RN  Outcome: Ongoing, Progressing

## 2022-07-21 NOTE — PT/OT/SLP PROGRESS
Physical Therapy  Treatment    Rosy Balderrama   MRN: [id number]   Admitting Diagnosis: Periprosthetic fracture of femur following total replacement of hip    PT Received On: 07/21/22  PT Start Time: 1213     PT Stop Time: 1236    PT Total Time (min): 23 min       Billable Minutes:  Therapeutic Activity 13 and Therapeutic Exercise 10    Treatment Type: Treatment  PT/PTA: PT     PTA Visit Number: 0       General Precautions: Standard, fall  Orthopedic Precautions: LLE toe touch weight bearing   Braces: N/A  Respiratory Status: Room air         Subjective:  Communicated with NURSE TANK AND [facility] CHART REVIEW prior to session.   PT AGREED TO TX AND SITTER PRESENT     Pain/Comfort  Pain Rating 1: 10/10  Location - Side 1: Left  Location 1: leg  Pain Addressed 1: Pre-medicate for activity  Pain Rating Post-Intervention 1: 10/10    Objective:   Patient found with: peripheral IV, telemetry, BEAN drain    Functional Mobility:  PT MET IN RM SUP. PT COMPLETED AP, HS, HIP ADD/ABD WITH AROM OF R LE AND AAROM OF L LE. PT COMPLETED UNILATERAL BRIDGE WITH R LE AND SCOOT TO EOB AND SUP>SIT EOB WITH MAX A>MOD A. PT SCOOTED TO EOB WITH MOD A AND REQUIRED MIN A TO MAINTAIN BALANCE. P.T. PROPPED PT WITH PILLOWS FOR SUPPORT FOR UPRIGHT SEATED TOLERANCE. PT AND SITTER EDUCATED ON TTWB OF L LE AS WELL. PT SET UP WITH LUNCH AND LEFT SEATED WITH SITTER PRESENT AND ALL NEEDS MET.     AM-PAC 6 CLICK MOBILITY  How much help from another person does this patient currently need?   1 = Unable, Total/Dependent Assistance  2 = A lot, Maximum/Moderate Assistance  3 = A little, Minimum/Contact Guard/Supervision  4 = None, Modified [place]/Independent    Turning over in bed (including adjusting bedclothes, sheets and blankets)?: 2  Sitting down on and standing up from a chair with arms (e.g., wheelchair, bedside commode, etc.): 1  Moving from lying on back to sitting on the side of the bed?: 2  Moving to and from a bed to a chair (including a  wheelchair)?: 1  Need to walk in hospital room?: 1  Climbing 3-5 steps with a railing?: 1  Basic Mobility Total Score: 8    AM-PAC Raw Score CMS G-Code Modifier Level of Impairment Assistance   6 % Total / Unable   7 - 9 CM 80 - 100% Maximal Assist   10 - 14 CL 60 - 80% Moderate Assist   15 - 19 CK 40 - 60% Moderate Assist   20 - 22 CJ 20 - 40% Minimal Assist   23 CI 1-20% SBA / CGA   24 CH 0% Independent/ Mod I     Patient left sitting edge of bed with call button in reach, bed alarm on and SITTER present.    Assessment:  PT LAUREN TX WITH INC PAIN AND FATIGUE    Rehab identified problem list/impairments: Rehab identified problem list/impairments: weakness, impaired endurance, impaired functional mobility, gait instability, impaired balance, impaired self care skills, decreased lower extremity function, decreased safety awareness, pain, edema, decreased upper extremity function, decreased ROM, orthopedic precautions    Rehab potential is fair.    Activity tolerance: Fair    Discharge recommendations: Discharge Facility/Level of Care Needs: nursing facility, skilled     Barriers to discharge:      Equipment recommendations: Equipment Needed After Discharge: none     GOALS:   Multidisciplinary Problems     Physical Therapy Goals        Problem: Physical Therapy    Goal Priority Disciplines Outcome Goal Variances Interventions   Physical Therapy Goal     PT, PT/OT Ongoing, Progressing     Description: LT/3/22  1. PT WILL COMPLETE BED MOBILITY WITH MOD A  2. PT WILL STAND TTWB WITH RW AND MAX A  3. PT WILL COMPLETE B LE TE X 10 REPS FOR INC ROM AND STRENGTHENING  4. PT WILL COMPLETE STAND PIVOT T/F TO CHAIR WITH TTWB AND MAX A                   PLAN:    Patient to be seen 3 x/week  to address the above listed problems via therapeutic activities, therapeutic exercises  Plan of Care expires: 22  Plan of Care reviewed with: patient, caregiver         2022

## 2022-07-21 NOTE — PROGRESS NOTES
[facility] [place] Medicine  Progress Note    Patient Name: Rosy Balderrama  MRN: [id number]  Patient Class: IP- Inpatient   Admission Date: 7/19/2022  Length of Stay: 2 days  Attending Physician: Boyd Fields MD  Primary Care Provider: Maude Barrios MD        Subjective:     Principal Problem:Periprosthetic fracture of femur following total replacement of hip        HPI:  Patient is a 89-year-old  female with past medical history of dementia, AFib, diabetes, hyperlipidemia who presents to the ED today after suffering a fall this morning.  History obtained via son due to patient's history of dementia.  When questioned on what happened, patient stated I am not sure ask my children.  Patient suffered a fall after going to the bathroom this a.m..  She was then taken to the ED.    In the ED, x-rays reveal spiral fracture left proximal femur with significant displacement.  She was given 1 dose of Rocephin for UTI.  Ortho notified. Hospital Medicine consulted and patient admitted.       Overview/Hospital Course:  7/20: POD 1, status post operative fixation of left periprosthetic femur fracture. Eliquis started for VTE prophylaxis. Will need snf placement.   7/21: POD 2, status post operative fixation of left periprosthetic femur fracture. Cont eliquis. Hgb 6.9 this am. Not suspecting active bleed. Likely attributed to expected blood loss from surgery. Due to critical blood shortage, will not transfuse at this time as it is not emergent. Vital signs stable. Will repeat h/h this afternoon, may consider transfusing at that time if h/h continues to trend down.       Interval History: POD 2, status post operative fixation of left periprosthetic femur fracture. Cont eliquis. Hgb 6.9 this am. Not suspecting active bleed. Likely attributed to expected blood loss from surgery. Due to critical blood shortage, will not transfuse at this time as it is not emergent. Vital signs stable. Will repeat h/h this afternoon, may  consider transfusing at that time if h/h continues to trend down.     Awaiting placement.    Review of Systems   Constitutional:  Negative for fatigue and fever.   HENT:  Negative for sinus pressure.    Eyes:  Negative for visual disturbance.   Respiratory:  Negative for shortness of breath.    Cardiovascular:  Negative for chest pain.   Gastrointestinal:  Negative for nausea and vomiting.   Genitourinary:  Negative for difficulty urinating.   Musculoskeletal:  Positive for arthralgias (left hip pain). Negative for back pain.   Skin:  Negative for rash.   Neurological:  Negative for headaches.   Psychiatric/Behavioral:  Negative for confusion.    Objective:     Vital Signs (Most Recent):  Temp: 99 °F (37.2 °C) (07/21/22 0707)  Pulse: 94 (07/21/22 0707)  Resp: 18 (07/21/22 0824)  BP: (!) 145/63 (07/21/22 0707)  SpO2: (!) 93 % (07/21/22 0824)   Vital Signs (24h Range):  Temp:  [97.8 °F (36.6 °C)-99.5 °F (37.5 °C)] 99 °F (37.2 °C)  Pulse:  [84-96] 94  Resp:  [16-18] 18  SpO2:  [91 %-96 %] 93 %  BP: ()/(48-63) 145/63     Weight: 65.6 kg (144 lb 10 oz)  Body mass index is 28.24 kg/m².    Intake/Output Summary (Last 24 hours) at 7/21/2022 1055  Last data filed at 7/21/2022 0559  Gross per 24 hour   Intake 1474.81 ml   Output 955 ml   Net 519.81 ml      Physical Exam  Constitutional:       General: She is not in acute distress.     Appearance: She is well-developed. She is not diaphoretic.   HENT:      Head: Normocephalic and atraumatic.   Eyes:      Pupils: Pupils are equal, round, and reactive to light.   Cardiovascular:      Rate and Rhythm: Normal rate and regular rhythm.      Heart sounds: Normal heart sounds. No murmur heard.    No friction rub. No gallop.   Pulmonary:      Effort: Pulmonary effort is normal. No respiratory distress.      Breath sounds: Normal breath sounds. No stridor. No wheezing or rales.   Abdominal:      General: Bowel sounds are normal. There is no distension.      Palpations: Abdomen is  soft. There is no mass.      Tenderness: There is no abdominal tenderness. There is no guarding.   Musculoskeletal:      Comments: Left lower extremity shortened and externally rotated   Skin:     General: Skin is warm.      Findings: No erythema.   Neurological:      Mental Status: She is alert. Mental status is at baseline.       Significant Labs: All pertinent labs within the past 24 hours have been reviewed.    Significant Imaging: I have reviewed all pertinent imaging results/findings within the past 24 hours.      Assessment/Plan:      * Periprosthetic fracture of femur following total replacement of hip  7/21:  POD 1  Pt/ot on case  SNF placement needed   Case management on case  eliquis started for VTE prophylaxis  Pt complains of pain  Will change to po percocet prn      Anemia  7/21:  hgb 6.9 this am  No active bleed suspected  Vital signs stable  Likely due to expected blood loss from surgery  Will not transfuse at this time due to critical blood shortage  Repeat h/h this afternoon       Closed left femoral fracture  Displaced proximal femoral fracture  Norco, morphine p.r.n. for pain  Ortho consult on case  NPO at midnight in the event surgery can be performed tomorrow    Advance Care Planning     Discussed case with son Arben who is power of .  States that he does not know his mother's wishes regarding code status.  Will continue full code for now.         Osteoporosis  Resume home meds      Paroxysmal atrial fibrillation  Continue home metoprolol  Patient not on anticoagulation  No history of bleeding reported  Demar Vasc score 4  Will likely need short term OAC VTE prophylaxis from Orthopedic surgery  Will defer long term anticoagulation to PCP      Hyperlipidemia  Resume statin    Diabetes mellitus type 2, controlled, without complications  Diabetic diet  Sliding scale  Hold home diabetic meds        VTE Risk Mitigation (From admission, onward)         Ordered     apixaban tablet 2.5 mg  2  times daily         07/20/22 0908     Place KIA hose  Until discontinued         07/19/22 2132     Place sequential compression device  Until discontinued         07/19/22 2132     IP VTE HIGH RISK PATIENT  Once         07/19/22 2132                Discharge Planning   ART:      Code Status: Full Code   Is the patient medically ready for discharge?:     Reason for patient still in hospital (select all that apply): Patient trending condition  Discharge Plan A: Skilled Nursing Facility                  Boyd Fields MD  Department of Hospital Medicine   [place] - Med Surg

## 2022-07-22 LAB
BASOPHILS # BLD AUTO: 0.01 K/UL (ref 0–0.2)
BASOPHILS NFR BLD: 0.2 % (ref 0–1.9)
BLD PROD TYP BPU: NORMAL
BLOOD UNIT EXPIRATION DATE: NORMAL
BLOOD UNIT TYPE CODE: 6200
BLOOD UNIT TYPE: NORMAL
CODING SYSTEM: NORMAL
DIFFERENTIAL METHOD: ABNORMAL
DISPENSE STATUS: NORMAL
EOSINOPHIL # BLD AUTO: 0.3 K/UL (ref 0–0.5)
EOSINOPHIL NFR BLD: 4.7 % (ref 0–8)
ERYTHROCYTE [DISTWIDTH] IN BLOOD BY AUTOMATED COUNT: 17.2 % (ref 11.5–14.5)
HCT VFR BLD AUTO: 20.1 % (ref 37–48.5)
HGB BLD-MCNC: 6.3 G/DL (ref 12–16)
IMM GRANULOCYTES # BLD AUTO: 0.08 K/UL (ref 0–0.04)
IMM GRANULOCYTES NFR BLD AUTO: 1.3 % (ref 0–0.5)
LYMPHOCYTES # BLD AUTO: 1 K/UL (ref 1–4.8)
LYMPHOCYTES NFR BLD: 15.5 % (ref 18–48)
MCH RBC QN AUTO: 32.3 PG (ref 27–31)
MCHC RBC AUTO-ENTMCNC: 31.3 G/DL (ref 32–36)
MCV RBC AUTO: 103 FL (ref 82–98)
MONOCYTES # BLD AUTO: 0.4 K/UL (ref 0.3–1)
MONOCYTES NFR BLD: 6.4 % (ref 4–15)
NEUTROPHILS # BLD AUTO: 4.6 K/UL (ref 1.8–7.7)
NEUTROPHILS NFR BLD: 71.9 % (ref 38–73)
NRBC BLD-RTO: 0 /100 WBC
NUM UNITS TRANS PACKED RBC: NORMAL
PLATELET # BLD AUTO: 116 K/UL (ref 150–450)
PMV BLD AUTO: 11 FL (ref 9.2–12.9)
POCT GLUCOSE: 134 MG/DL (ref 70–110)
POCT GLUCOSE: 217 MG/DL (ref 70–110)
RBC # BLD AUTO: 1.95 M/UL (ref 4–5.4)
WBC # BLD AUTO: 6.4 K/UL (ref 3.9–12.7)

## 2022-07-22 PROCEDURE — 97110 THERAPEUTIC EXERCISES: CPT

## 2022-07-22 PROCEDURE — 94761 N-INVAS EAR/PLS OXIMETRY MLT: CPT

## 2022-07-22 PROCEDURE — 25000003 PHARM REV CODE 250: Performed by: FAMILY MEDICINE

## 2022-07-22 PROCEDURE — 36415 COLL VENOUS BLD VENIPUNCTURE: CPT | Performed by: ORTHOPAEDIC SURGERY

## 2022-07-22 PROCEDURE — 25000003 PHARM REV CODE 250: Performed by: ORTHOPAEDIC SURGERY

## 2022-07-22 PROCEDURE — 97530 THERAPEUTIC ACTIVITIES: CPT

## 2022-07-22 PROCEDURE — 36430 TRANSFUSION BLD/BLD COMPNT: CPT

## 2022-07-22 PROCEDURE — 11000001 HC ACUTE MED/SURG PRIVATE ROOM

## 2022-07-22 PROCEDURE — P9016 RBC LEUKOCYTES REDUCED: HCPCS | Performed by: ORTHOPAEDIC SURGERY

## 2022-07-22 PROCEDURE — 85025 COMPLETE CBC W/AUTO DIFF WBC: CPT | Performed by: ORTHOPAEDIC SURGERY

## 2022-07-22 RX ORDER — OXYCODONE HYDROCHLORIDE 5 MG/1
5 TABLET ORAL EVERY 4 HOURS PRN
Status: DISCONTINUED | OUTPATIENT
Start: 2022-07-22 | End: 2022-07-26

## 2022-07-22 RX ORDER — POLYETHYLENE GLYCOL 3350 17 G/17G
17 POWDER, FOR SOLUTION ORAL 2 TIMES DAILY
Status: DISCONTINUED | OUTPATIENT
Start: 2022-07-22 | End: 2022-07-27 | Stop reason: HOSPADM

## 2022-07-22 RX ORDER — TRAMADOL HYDROCHLORIDE 50 MG/1
100 TABLET ORAL EVERY 6 HOURS PRN
Status: DISCONTINUED | OUTPATIENT
Start: 2022-07-22 | End: 2022-07-27 | Stop reason: HOSPADM

## 2022-07-22 RX ORDER — HYDROCODONE BITARTRATE AND ACETAMINOPHEN 500; 5 MG/1; MG/1
TABLET ORAL
Status: DISCONTINUED | OUTPATIENT
Start: 2022-07-22 | End: 2022-07-27 | Stop reason: HOSPADM

## 2022-07-22 RX ORDER — ACETAMINOPHEN 500 MG
1000 TABLET ORAL EVERY 8 HOURS
Status: DISCONTINUED | OUTPATIENT
Start: 2022-07-22 | End: 2022-07-27 | Stop reason: HOSPADM

## 2022-07-22 RX ADMIN — ACETAMINOPHEN 1000 MG: 500 TABLET ORAL at 02:07

## 2022-07-22 RX ADMIN — ACETAMINOPHEN 1000 MG: 500 TABLET ORAL at 09:07

## 2022-07-22 RX ADMIN — POLYETHYLENE GLYCOL 3350 17 G: 17 POWDER, FOR SOLUTION ORAL at 09:07

## 2022-07-22 RX ADMIN — APIXABAN 2.5 MG: 2.5 TABLET, FILM COATED ORAL at 09:07

## 2022-07-22 RX ADMIN — DOCUSATE SODIUM 100 MG: 100 CAPSULE, LIQUID FILLED ORAL at 09:07

## 2022-07-22 RX ADMIN — DOCUSATE SODIUM 100 MG: 100 CAPSULE, LIQUID FILLED ORAL at 08:07

## 2022-07-22 RX ADMIN — RALOXIFENE 60 MG: 60 TABLET ORAL at 08:07

## 2022-07-22 RX ADMIN — TRAMADOL HYDROCHLORIDE 100 MG: 50 TABLET, COATED ORAL at 09:07

## 2022-07-22 RX ADMIN — CHLORHEXIDINE GLUCONATE 0.12% ORAL RINSE 10 ML: 1.2 LIQUID ORAL at 08:07

## 2022-07-22 RX ADMIN — Medication 1000 UNITS: at 08:07

## 2022-07-22 RX ADMIN — MEMANTINE 10 MG: 10 TABLET ORAL at 09:07

## 2022-07-22 RX ADMIN — OXYCODONE AND ACETAMINOPHEN 1 TABLET: 10; 325 TABLET ORAL at 08:07

## 2022-07-22 RX ADMIN — PRAVASTATIN SODIUM 40 MG: 20 TABLET ORAL at 08:07

## 2022-07-22 RX ADMIN — METOPROLOL SUCCINATE 25 MG: 25 TABLET, FILM COATED, EXTENDED RELEASE ORAL at 08:07

## 2022-07-22 RX ADMIN — OXYCODONE AND ACETAMINOPHEN 1 TABLET: 10; 325 TABLET ORAL at 03:07

## 2022-07-22 RX ADMIN — APIXABAN 2.5 MG: 2.5 TABLET, FILM COATED ORAL at 08:07

## 2022-07-22 RX ADMIN — QUETIAPINE FUMARATE 25 MG: 25 TABLET ORAL at 09:07

## 2022-07-22 RX ADMIN — MEMANTINE 10 MG: 10 TABLET ORAL at 08:07

## 2022-07-22 NOTE — PT/OT/SLP PROGRESS
Physical Therapy  Treatment    Rosy Balderrama   MRN: [id number]   Admitting Diagnosis: Periprosthetic fracture of femur following total replacement of hip    PT Received On: 07/22/22  PT Start Time: 1450     PT Stop Time: 1513    PT Total Time (min): 23 min       Billable Minutes:  Therapeutic Activity 13 and Therapeutic Exercise 10    Treatment Type: Treatment  PT/PTA: PT     PTA Visit Number: 0       General Precautions: Standard, fall  Orthopedic Precautions: LLE toe touch weight bearing   Braces: N/A  Respiratory Status: Room air         Subjective:  Communicated with NURSE TANK AND Epic CHART REVIEW  prior to session.   PT MET IN  AGREED TO TX . TX LIMITED D/T BLOOD TRANSFUSION BEING ADMINISTERED    Pain/Comfort  Pain Rating 1: 10/10  Location - Side 1: Left  Location 1: leg  Pain Addressed 1: Pre-medicate for activity, Cessation of Activity  Pain Rating Post-Intervention 1: 10/10    Objective:   Patient found with: peripheral IV, telemetry, BEAN drain    Functional Mobility:  PT MET IN  AND P.T. NOTICED BLOOD ON COVER BY IV. PT IV LEAKING BLOOD NURSE CALLED TO ROOM. NURSE STOPPED IV. PT COMPLETED SUP TE AROM OF R LE AND AAROM OF L LE. PT WITH PAIN IN B LE WITH TX. AP, HS, SAQ, AND HIP ADD/ABD X 10 REPS WITH MIN ROM. PT PRACTICED LOG ROLLING WITH MAX A AND INC TIME WITH EMPHASIS ON BED MOBILITY . PT MOANING OUT IN PAIN WITH ALL MOBILITY. PT ASSIST IN SCOOTING TO HOB WITH R LE UNILATERAL BRIDGING WITH MAX A. ICE PACKS DONNED TO LEFT LE WHERE THEY WERE PRIOR TO P.T.  PT LEFT WITH B HEELS FLOATED AND ALL NEEDS MET.    AM-PAC 6 CLICK MOBILITY  How much help from another person does this patient currently need?   1 = Unable, Total/Dependent Assistance  2 = A lot, Maximum/Moderate Assistance  3 = A little, Minimum/Contact Guard/Supervision  4 = None, Modified [place]/Independent    Turning over in bed (including adjusting bedclothes, sheets and blankets)?: 2  Sitting down on and standing up from a chair with  arms (e.g., wheelchair, bedside commode, etc.): 1  Moving from lying on back to sitting on the side of the bed?: 1  Moving to and from a bed to a chair (including a wheelchair)?: 1  Need to walk in hospital room?: 1  Climbing 3-5 steps with a railing?: 1  Basic Mobility Total Score: 7    AM-PAC Raw Score CMS G-Code Modifier Level of Impairment Assistance   6 % Total / Unable   7 - 9 CM 80 - 100% Maximal Assist   10 - 14 CL 60 - 80% Moderate Assist   15 - 19 CK 40 - 60% Moderate Assist   20 - 22 CJ 20 - 40% Minimal Assist   23 CI 1-20% SBA / CGA   24 CH 0% Independent/ Mod I     Patient left HOB elevated with call button in reach.    Assessment:  PT LAUREN MIN TX WITH INC PAIN IN B LE.     Rehab identified problem list/impairments: Rehab identified problem list/impairments: weakness, decreased upper extremity function, gait instability, decreased lower extremity function, impaired balance, impaired endurance, pain, impaired self care skills, impaired functional mobility, edema, orthopedic precautions, decreased ROM    Rehab potential is fair.    Activity tolerance: Poor    Discharge recommendations: Discharge Facility/Level of Care Needs: nursing facility, skilled     Barriers to discharge:      Equipment recommendations: Equipment Needed After Discharge: none     GOALS:   Multidisciplinary Problems     Physical Therapy Goals        Problem: Physical Therapy    Goal Priority Disciplines Outcome Goal Variances Interventions   Physical Therapy Goal     PT, PT/OT Ongoing, Not Progressing     Description: LT/3/22  1. PT WILL COMPLETE BED MOBILITY WITH MOD A  2. PT WILL STAND TTWB WITH RW AND MAX A  3. PT WILL COMPLETE B LE TE X 10 REPS FOR INC ROM AND STRENGTHENING  4. PT WILL COMPLETE STAND PIVOT T/F TO CHAIR WITH TTWB AND MAX A                   PLAN:    Patient to be seen 3 x/week  to address the above listed problems via therapeutic activities, therapeutic exercises  Plan of Care expires: 22  Plan of  Care reviewed with: patient, caregiver   `  07/22/2022

## 2022-07-22 NOTE — PROGRESS NOTES
[facility] Medicine  Progress Note    Patient Name: Rosy Balderrama  MRN: [id number]  Patient Class: IP- Inpatient   Admission Date: 7/19/2022  Length of Stay: 3 days  Attending Physician: Boyd Fields MD  Primary Care Provider: Maude Barrios MD        Subjective:     Principal Problem:Periprosthetic fracture of femur following total replacement of hip        HPI:  Patient is a 89-year-old  female with past medical history of dementia, AFib, diabetes, hyperlipidemia who presents to the ED today after suffering a fall this morning.  History obtained via son due to patient's history of dementia.  When questioned on what happened, patient stated I am not sure ask my children.  Patient suffered a fall after going to the bathroom this a.m..  She was then taken to the ED.    In the ED, x-rays reveal spiral fracture left proximal femur with significant displacement.  She was given 1 dose of Rocephin for UTI.  Ortho notified. Hospital Medicine consulted and patient admitted.       Overview/Hospital Course:  7/20: POD 1, status post operative fixation of left periprosthetic femur fracture. Eliquis started for VTE prophylaxis. Will need snf placement.   7/21: POD 2, status post operative fixation of left periprosthetic femur fracture. Cont eliquis. Hgb 6.9 this am. Not suspecting active bleed. Likely attributed to expected blood loss from surgery. Due to critical blood shortage, will not transfuse at this time as it is not emergent. Vital signs stable. Will repeat h/h this afternoon, may consider transfusing at that time if h/h continues to trend down.   7/22: POD 3, 1 unit prbc today. Hgb 6.3. SNF placement pending.       Interval History: POD 3, 1 unit prbc today. Hgb 6.3. SNF placement pending.     Review of Systems  Objective:     Vital Signs (Most Recent):  Temp: 98.4 °F (36.9 °C) (07/22/22 1104)  Pulse: 71 (07/22/22 1104)  Resp: 17 (07/22/22 1104)  BP: 123/63 (07/22/22 1104)  SpO2: 95 % (07/22/22  1104) Vital Signs (24h Range):  Temp:  [97.7 °F (36.5 °C)-99.3 °F (37.4 °C)] 98.4 °F (36.9 °C)  Pulse:  [69-85] 71  Resp:  [16-18] 17  SpO2:  [91 %-96 %] 95 %  BP: (106-147)/(54-82) 123/63     Weight: 68 kg (149 lb 14.6 oz)  Body mass index is 29.28 kg/m².    Intake/Output Summary (Last 24 hours) at 7/22/2022 1149  Last data filed at 7/22/2022 1000  Gross per 24 hour   Intake 1100 ml   Output 70 ml   Net 1030 ml      Physical Exam  Constitutional:       General: She is not in acute distress.     Appearance: She is well-developed. She is not diaphoretic.   HENT:      Head: Normocephalic and atraumatic.   Eyes:      Pupils: Pupils are equal, round, and reactive to light.   Cardiovascular:      Rate and Rhythm: Normal rate and regular rhythm.      Heart sounds: Normal heart sounds. No murmur heard.    No friction rub. No gallop.   Pulmonary:      Effort: Pulmonary effort is normal. No respiratory distress.      Breath sounds: Normal breath sounds. No stridor. No wheezing or rales.   Abdominal:      General: Bowel sounds are normal. There is no distension.      Palpations: Abdomen is soft. There is no mass.      Tenderness: There is no abdominal tenderness. There is no guarding.   Musculoskeletal:      Comments: Left lower extremity shortened and externally rotated   Skin:     General: Skin is warm.      Findings: No erythema.   Neurological:      Mental Status: She is alert. Mental status is at baseline.       Significant Labs: All pertinent labs within the past 24 hours have been reviewed.    Significant Imaging: I have reviewed all pertinent imaging results/findings within the past 24 hours.        Assessment/Plan:      * Periprosthetic fracture of femur following total replacement of hip  7/22:  POD 3  Pt/ot on case  SNF placement needed   Case management on case  eliquis started for VTE prophylaxis  Pt complains of pain  Will change to po percocet prn  1 unit prbc transfused today      Anemia  7/21:  hgb 6.9 this  am  No active bleed suspected  Vital signs stable  Likely due to expected blood loss from surgery  Will not transfuse at this time due to critical blood shortage  Repeat h/h this afternoon     7/22:  hgb 6.3  Will transfuse 1 unit prbc     Closed left femoral fracture  Displaced proximal femoral fracture  Norco, morphine p.r.n. for pain  Ortho consult on case  NPO at midnight in the event surgery can be performed tomorrow    Advance Care Planning     Discussed case with son Arben who is power of .  States that he does not know his mother's wishes regarding code status.  Will continue full code for now.         Osteoporosis  Resume home meds      Paroxysmal atrial fibrillation  Continue home metoprolol  Patient not on anticoagulation  No history of bleeding reported  Demar Vasc score 4  Will likely need short term OAC VTE prophylaxis from Orthopedic surgery  Will defer long term anticoagulation to PCP      Hyperlipidemia  Resume statin    Diabetes mellitus type 2, controlled, without complications  Diabetic diet  Sliding scale  Hold home diabetic meds        VTE Risk Mitigation (From admission, onward)         Ordered     apixaban tablet 2.5 mg  2 times daily         07/20/22 0908     Place KIA hose  Until discontinued         07/19/22 2132     Place sequential compression device  Until discontinued         07/19/22 2132     IP VTE HIGH RISK PATIENT  Once         07/19/22 2132                Discharge Planning   ART:      Code Status: Full Code   Is the patient medically ready for discharge?:     Reason for patient still in hospital (select all that apply): Patient trending condition  Discharge Plan A: Home with family, Home Health                  Boyd Fields MD  Department of Hospital Medicine   [facility] - Med Surg

## 2022-07-22 NOTE — PLAN OF CARE
Patient accepted at [facility] pending bed status on Monday.  Patient not medically ready on 7-22 07/22/22 1504   Post-Acute Status   Post-Acute Authorization Placement   Post-Acute Placement Status Pending post-acute provider review/more information requested

## 2022-07-22 NOTE — ASSESSMENT & PLAN NOTE
7/22:  POD 3  Pt/ot on case  SNF placement needed   Case management on case  eliquis started for VTE prophylaxis  Pt complains of pain  Will change to po percocet prn  1 unit prbc transfused today

## 2022-07-22 NOTE — SUBJECTIVE & OBJECTIVE
Interval History: POD 3, 1 unit prbc today. Hgb 6.3. SNF placement pending.     Review of Systems  Objective:     Vital Signs (Most Recent):  Temp: 98.4 °F (36.9 °C) (07/22/22 1104)  Pulse: 71 (07/22/22 1104)  Resp: 17 (07/22/22 1104)  BP: 123/63 (07/22/22 1104)  SpO2: 95 % (07/22/22 1104) Vital Signs (24h Range):  Temp:  [97.7 °F (36.5 °C)-99.3 °F (37.4 °C)] 98.4 °F (36.9 °C)  Pulse:  [69-85] 71  Resp:  [16-18] 17  SpO2:  [91 %-96 %] 95 %  BP: (106-147)/(54-82) 123/63     Weight: 68 kg (149 lb 14.6 oz)  Body mass index is 29.28 kg/m².    Intake/Output Summary (Last 24 hours) at 7/22/2022 1149  Last data filed at 7/22/2022 1000  Gross per 24 hour   Intake 1100 ml   Output 70 ml   Net 1030 ml      Physical Exam  Constitutional:       General: She is not in acute distress.     Appearance: She is well-developed. She is not diaphoretic.   HENT:      Head: Normocephalic and atraumatic.   Eyes:      Pupils: Pupils are equal, round, and reactive to light.   Cardiovascular:      Rate and Rhythm: Normal rate and regular rhythm.      Heart sounds: Normal heart sounds. No murmur heard.    No friction rub. No gallop.   Pulmonary:      Effort: Pulmonary effort is normal. No respiratory distress.      Breath sounds: Normal breath sounds. No stridor. No wheezing or rales.   Abdominal:      General: Bowel sounds are normal. There is no distension.      Palpations: Abdomen is soft. There is no mass.      Tenderness: There is no abdominal tenderness. There is no guarding.   Musculoskeletal:      Comments: Left lower extremity shortened and externally rotated   Skin:     General: Skin is warm.      Findings: No erythema.   Neurological:      Mental Status: She is alert. Mental status is at baseline.       Significant Labs: All pertinent labs within the past 24 hours have been reviewed.    Significant Imaging: I have reviewed all pertinent imaging results/findings within the past 24 hours.

## 2022-07-22 NOTE — ASSESSMENT & PLAN NOTE
7/21:  hgb 6.9 this am  No active bleed suspected  Vital signs stable  Likely due to expected blood loss from surgery  Will not transfuse at this time due to critical blood shortage  Repeat h/h this afternoon     7/22:  hgb 6.3  Will transfuse 1 unit prbc

## 2022-07-22 NOTE — PROGRESS NOTES
Ortho Daily Progress Note    Rosy Balderrama is a 89 y.o. female admitted on 7/19/2022        Hospital Day: 3    Post Op Day: 3 Days Post-Op s/p open reduction internal fixation of left periprosthetic femur fracture    SUBJECTIVE:  The patient was seen and examined this morning at the bedside with son at bedside. Patient reports no acute issues overnight.  Patient is having significant pain mostly in knee and thigh.   _______________    OBJECTIVE:    Vital Signs (Most Recent)  Vitals:    07/22/22 0719   BP: (!) 147/63   Pulse: 82   Resp: 18   Temp:        Intake/Output Summary (Last 24 hours) at 7/22/2022 0800  Last data filed at 7/21/2022 2000  Gross per 24 hour   Intake 800 ml   Output 50 ml   Net 750 ml        Patient is resting comfortable in bed but anxious  AAOx3  left lower extremity : dressings clean/dry/intact with BEAN drain in place  NVI Distally  Palpable distal pulses  Brisk cap refill      Labs:   Recent Lab Results  (Last 5 results in the past 24 hours)      07/21/22  2031   07/21/22  1800   07/21/22  1530   07/21/22  1135   07/21/22  0927        Anion Gap         8       Baso #         0.00       Basophil %         0.0       BUN         22       Calcium         7.9       Chloride         113       CO2         20       Creatinine         0.8       Differential Method         Automated       eGFR if          >60       eGFR if non          >60  Comment: Calculation used to obtain the estimated glomerular filtration  rate (eGFR) is the CKD-EPI equation.          Eos #         0.2       Eosinophil %         1.8       Glucose         203       Gran # (ANC)         7.7       Gran %         86.0       Hematocrit     20.0     22.2       Hemoglobin     6.5     6.9       Immature Grans (Abs)         0.09  Comment: Mild elevation in immature granulocytes is non specific and   can be seen in a variety of conditions including stress response,   acute inflammation, trauma and pregnancy.  Correlation with other   laboratory and clinical findings is essential.         Immature Granulocytes         1.0       Lymph #         0.6       Lymph %         6.2       MCH         33.0       MCHC         31.1       MCV         106  Comment: Results confirmed, test repeated       Mono #         0.5       Mono %         5.0       MPV         10.9       nRBC         0       Platelets         128       POCT Glucose 212       207         Potassium         4.3       RBC         2.09       RDW         17.5       SARS-CoV-2 RNA, Amplification, Qual   Negative  Comment: This test utilizes isothermal nucleic acid amplification   technology to detect the SARS-CoV-2 RdRp nucleic acid segment.   The analytical sensitivity (limit of detection) is 125 genome   equivalents/mL.     A POSITIVE result implies infection with the SARS-CoV-2 virus;  the patient is presumed to be contagious.    A NEGATIVE result means that SARS-CoV-2 nucleic acids are not  present above the limit of detection. A NEGATIVE result should be   treated as presumptive. It does not rule out the possibility of   COVID-19 and should not be the sole basis for treatment decisions.   If COVID-19 is strongly suspected based on clinical and exposure   history, re-testing using an alternate molecular assay should be   considered.       This test is only for use under the Food and Drug   Administration s Emergency Use Authorization (EUA).   Commercial kits are provided by [employer].   Performance characteristics of the EUA have been independently  verified by [facility] Department of  Pathology and Laboratory Medicine.   _________________________________________________________________  The ID NOW COVID-19 Letter of Authorization, along with the   authorized Fact Sheet for Healthcare Providers, the authorized Fact  Sheet for Patients, and authorized labeling are available on the FDA    website:  www.fda.gov/MedicalDevices/Safety/EmergencySituations/ytg360412.htm               Sodium         141       WBC         8.93                           Microbiology Results (last 7 days)     ** No results found for the last 168 hours. **           _______________    ASSESSMENT:    1. POD 3 s/p ORIF Left [place] B1 periprosthetic femur fracture  2. Type 2 diabetes mellitus  3. Acute on Chronic Anemia  4. Osteoporosis  5. Hyperpararthyroidism               _______________    PLAN:  · Ordered stat CBC and Type crossed 1 unit of PRBC for transfusion.  · Pain control - placed patient on scheduled tylenol and changed narcotic regimen to tramadol 100mg po Q6H prn moderate pain and oxycodone 5mg po Q4H prn severe pain.   · DVT prophylaxis - eliquis 2.5mg po BID x 4 weeks  · Drain:  Drain will remain in place until the output is less than 25 cc per shift for 2 consecutive shifts.  · Dressings:  Current dressings will will remain in place for 5 days.  On postoperative day 5 (7/24), Aquacel dressing may be removed and the wound can be cleaned with Betadine.  A new Aquacel dressing with the need to be applied for another 5-7 days.  · Follow-up:  Patient will need to follow up in the Orthopedic Clinic in approximately 2 weeks (8/2) for a wound check and possible suture removal.  Depending on her skin looks, she may need to have the sutures in for a full 21 days.  She will need x-rays at 4 weeks.  · Weight bearing:  Patient will be toe-touch weight-bearing for at least 4 weeks; then she will proceed to weight-bearing as tolerated depending on bony healing noted on her x-rays.         Yulissa Marx MD, FAAOS  Orthopaedic Surgeon

## 2022-07-23 LAB
BASOPHILS # BLD AUTO: 0.01 K/UL (ref 0–0.2)
BASOPHILS NFR BLD: 0.1 % (ref 0–1.9)
DIFFERENTIAL METHOD: ABNORMAL
EOSINOPHIL # BLD AUTO: 0.2 K/UL (ref 0–0.5)
EOSINOPHIL NFR BLD: 2.4 % (ref 0–8)
ERYTHROCYTE [DISTWIDTH] IN BLOOD BY AUTOMATED COUNT: 18.6 % (ref 11.5–14.5)
HCT VFR BLD AUTO: 28.2 % (ref 37–48.5)
HGB BLD-MCNC: 9.3 G/DL (ref 12–16)
IMM GRANULOCYTES # BLD AUTO: 0.15 K/UL (ref 0–0.04)
IMM GRANULOCYTES NFR BLD AUTO: 1.6 % (ref 0–0.5)
LYMPHOCYTES # BLD AUTO: 1 K/UL (ref 1–4.8)
LYMPHOCYTES NFR BLD: 10.5 % (ref 18–48)
MCH RBC QN AUTO: 30.9 PG (ref 27–31)
MCHC RBC AUTO-ENTMCNC: 33 G/DL (ref 32–36)
MCV RBC AUTO: 94 FL (ref 82–98)
MONOCYTES # BLD AUTO: 0.6 K/UL (ref 0.3–1)
MONOCYTES NFR BLD: 6.3 % (ref 4–15)
NEUTROPHILS # BLD AUTO: 7.3 K/UL (ref 1.8–7.7)
NEUTROPHILS NFR BLD: 79.1 % (ref 38–73)
NRBC BLD-RTO: 0 /100 WBC
PLATELET # BLD AUTO: 149 K/UL (ref 150–450)
PMV BLD AUTO: 11 FL (ref 9.2–12.9)
POCT GLUCOSE: 138 MG/DL (ref 70–110)
POCT GLUCOSE: 195 MG/DL (ref 70–110)
POCT GLUCOSE: 196 MG/DL (ref 70–110)
POCT GLUCOSE: 259 MG/DL (ref 70–110)
RBC # BLD AUTO: 3.01 M/UL (ref 4–5.4)
WBC # BLD AUTO: 9.27 K/UL (ref 3.9–12.7)

## 2022-07-23 PROCEDURE — 85025 COMPLETE CBC W/AUTO DIFF WBC: CPT | Performed by: NURSE PRACTITIONER

## 2022-07-23 PROCEDURE — 11000001 HC ACUTE MED/SURG PRIVATE ROOM

## 2022-07-23 PROCEDURE — 25000003 PHARM REV CODE 250: Performed by: FAMILY MEDICINE

## 2022-07-23 PROCEDURE — 25000003 PHARM REV CODE 250: Performed by: ORTHOPAEDIC SURGERY

## 2022-07-23 PROCEDURE — 25000003 PHARM REV CODE 250: Performed by: NURSE PRACTITIONER

## 2022-07-23 PROCEDURE — 36415 COLL VENOUS BLD VENIPUNCTURE: CPT | Performed by: NURSE PRACTITIONER

## 2022-07-23 PROCEDURE — 94761 N-INVAS EAR/PLS OXIMETRY MLT: CPT

## 2022-07-23 RX ORDER — BISACODYL 10 MG
10 SUPPOSITORY, RECTAL RECTAL ONCE
Status: COMPLETED | OUTPATIENT
Start: 2022-07-23 | End: 2022-07-23

## 2022-07-23 RX ADMIN — MEMANTINE 10 MG: 10 TABLET ORAL at 08:07

## 2022-07-23 RX ADMIN — APIXABAN 2.5 MG: 2.5 TABLET, FILM COATED ORAL at 09:07

## 2022-07-23 RX ADMIN — ACETAMINOPHEN 1000 MG: 500 TABLET ORAL at 09:07

## 2022-07-23 RX ADMIN — Medication 1000 UNITS: at 08:07

## 2022-07-23 RX ADMIN — METOPROLOL SUCCINATE 25 MG: 25 TABLET, FILM COATED, EXTENDED RELEASE ORAL at 08:07

## 2022-07-23 RX ADMIN — CHLORHEXIDINE GLUCONATE 0.12% ORAL RINSE 10 ML: 1.2 LIQUID ORAL at 09:07

## 2022-07-23 RX ADMIN — CHLORHEXIDINE GLUCONATE 0.12% ORAL RINSE 10 ML: 1.2 LIQUID ORAL at 08:07

## 2022-07-23 RX ADMIN — ONDANSETRON 4 MG: 4 TABLET, ORALLY DISINTEGRATING ORAL at 08:07

## 2022-07-23 RX ADMIN — ACETAMINOPHEN 1000 MG: 500 TABLET ORAL at 01:07

## 2022-07-23 RX ADMIN — PRAVASTATIN SODIUM 40 MG: 20 TABLET ORAL at 08:07

## 2022-07-23 RX ADMIN — OXYCODONE HYDROCHLORIDE 5 MG: 5 TABLET ORAL at 08:07

## 2022-07-23 RX ADMIN — INSULIN ASPART 2 UNITS: 100 INJECTION, SOLUTION INTRAVENOUS; SUBCUTANEOUS at 05:07

## 2022-07-23 RX ADMIN — POLYETHYLENE GLYCOL 3350 17 G: 17 POWDER, FOR SOLUTION ORAL at 09:07

## 2022-07-23 RX ADMIN — OXYCODONE HYDROCHLORIDE 5 MG: 5 TABLET ORAL at 05:07

## 2022-07-23 RX ADMIN — BISACODYL 10 MG: 10 SUPPOSITORY RECTAL at 05:07

## 2022-07-23 RX ADMIN — MEMANTINE 10 MG: 10 TABLET ORAL at 09:07

## 2022-07-23 RX ADMIN — RALOXIFENE 60 MG: 60 TABLET ORAL at 08:07

## 2022-07-23 RX ADMIN — DOCUSATE SODIUM 100 MG: 100 CAPSULE, LIQUID FILLED ORAL at 08:07

## 2022-07-23 RX ADMIN — APIXABAN 2.5 MG: 2.5 TABLET, FILM COATED ORAL at 08:07

## 2022-07-23 RX ADMIN — POLYETHYLENE GLYCOL 3350 17 G: 17 POWDER, FOR SOLUTION ORAL at 08:07

## 2022-07-23 RX ADMIN — DOCUSATE SODIUM 100 MG: 100 CAPSULE, LIQUID FILLED ORAL at 09:07

## 2022-07-23 RX ADMIN — QUETIAPINE FUMARATE 25 MG: 25 TABLET ORAL at 09:07

## 2022-07-23 NOTE — ASSESSMENT & PLAN NOTE
7/22:  POD 3  Pt/ot on case  SNF placement needed   Case management on case  eliquis started for VTE prophylaxis  Pt complains of pain  Will change to oxycodone PRN

## 2022-07-23 NOTE — SUBJECTIVE & OBJECTIVE
Interval History: Pending SNF placement    Review of Systems   Constitutional:  Positive for activity change and fatigue. Negative for appetite change, chills, diaphoresis, fever and unexpected weight change.   HENT:  Negative for congestion, hearing loss, mouth sores, postnasal drip, rhinorrhea, sore throat and trouble swallowing.    Eyes:  Negative for discharge and visual disturbance.   Respiratory:  Negative for cough, chest tightness and shortness of breath.    Cardiovascular:  Negative for chest pain, palpitations and leg swelling.   Gastrointestinal:  Negative for abdominal distention, blood in stool, constipation, diarrhea, nausea and vomiting.   Endocrine: Negative for cold intolerance and heat intolerance.   Genitourinary:  Negative for difficulty urinating, dyspareunia, flank pain and hematuria.   Musculoskeletal:  Positive for arthralgias, back pain and myalgias.   Skin: Negative.    Neurological:  Positive for weakness. Negative for dizziness, light-headedness and headaches.   Hematological:  Negative for adenopathy. Does not bruise/bleed easily.   Psychiatric/Behavioral:  Negative for agitation, behavioral problems and confusion. The patient is not nervous/anxious.    Objective:     Vital Signs (Most Recent):  Temp: 97.9 °F (36.6 °C) (07/23/22 1210)  Pulse: 76 (07/23/22 1210)  Resp: 18 (07/23/22 1210)  BP: 138/71 (07/23/22 1210)  SpO2: 96 % (07/23/22 1210) Vital Signs (24h Range):  Temp:  [97.7 °F (36.5 °C)-99.7 °F (37.6 °C)] 97.9 °F (36.6 °C)  Pulse:  [72-79] 76  Resp:  [16-18] 18  SpO2:  [93 %-96 %] 96 %  BP: (130-176)/(60-73) 138/71     Weight: 68 kg (149 lb 14.6 oz)  Body mass index is 29.28 kg/m².    Intake/Output Summary (Last 24 hours) at 7/23/2022 1429  Last data filed at 7/23/2022 0845  Gross per 24 hour   Intake 2971.37 ml   Output 1125 ml   Net 1846.37 ml      Physical Exam  Vitals and nursing note reviewed.   Constitutional:       General: She is not in acute distress.     Appearance: She  is well-developed.   HENT:      Head: Normocephalic and atraumatic.      Right Ear: Hearing and external ear normal.      Left Ear: Hearing and external ear normal.      Nose: No rhinorrhea.      Right Sinus: No maxillary sinus tenderness or frontal sinus tenderness.      Left Sinus: No maxillary sinus tenderness or frontal sinus tenderness.      Mouth/Throat:      Mouth: No oral lesions.      Pharynx: Uvula midline.   Eyes:      General:         Right eye: No discharge.         Left eye: No discharge.      Conjunctiva/sclera: Conjunctivae normal.      Pupils: Pupils are equal, round, and reactive to light.   Neck:      Thyroid: No thyromegaly.      Vascular: No carotid bruit.      Trachea: No tracheal deviation.   Cardiovascular:      Rate and Rhythm: Normal rate and regular rhythm.      Pulses:           Dorsalis pedis pulses are 2+ on the right side and 2+ on the left side.      Heart sounds: Normal heart sounds, S1 normal and S2 normal. No murmur heard.  Pulmonary:      Effort: Pulmonary effort is normal. No respiratory distress.      Breath sounds: Normal breath sounds.   Chest:   Breasts:      Right: No supraclavicular adenopathy.      Left: No supraclavicular adenopathy.     Abdominal:      General: Bowel sounds are normal. There is no distension.      Palpations: Abdomen is soft. There is no mass.      Tenderness: There is no abdominal tenderness.   Musculoskeletal:      Cervical back: Normal range of motion.      Left hip: Decreased range of motion.   Lymphadenopathy:      Cervical: No cervical adenopathy.      Upper Body:      Right upper body: No supraclavicular adenopathy.      Left upper body: No supraclavicular adenopathy.   Skin:     General: Skin is warm and dry.      Capillary Refill: Capillary refill takes less than 2 seconds.      Coloration: Skin is pale.      Findings: No rash.          Neurological:      Mental Status: She is alert and oriented to person, place, and time.      Sensory: No  sensory deficit.      Coordination: Coordination normal.      Gait: Gait normal.   Psychiatric:         Mood and Affect: Mood is not anxious or depressed.         Speech: Speech normal.         Behavior: Behavior normal.         Thought Content: Thought content normal.         Judgment: Judgment normal.       Significant Labs: All pertinent labs within the past 24 hours have been reviewed.  CBC:   Recent Labs   Lab 07/21/22  1530 07/22/22  0817   WBC  --  6.40   HGB 6.5* 6.3*   HCT 20.0* 20.1*   PLT  --  116*       Significant Imaging: I have reviewed all pertinent imaging results/findings within the past 24 hours.

## 2022-07-23 NOTE — PROGRESS NOTES
89F  Prior L CHELY  Fall  L periprosthetic femoral shaft fx  ORIF 7.19    Doing OK  Some confusion  Difficulty mobilizing w/ PT    Drain  NR/10/15    LLE  Dressing w/ some shadowing  Compartments soft compressible  NVID      POST OP PLAN:  · Drain:  Drain will remain in place until the output is less than 25 cc per shift for 2 consecutive shifts. Will reassess drain output Sunday and pull at that time if output remains low.  · Dressings:  Current dressings will will remain in place for 5 days.  On postoperative day 5, Aquacel dressing may be removed and the wound can be cleaned with Betadine.  A new Aquacel dressing with the need to be applied for another 5-7 days.  · Follow-up:  Patient will need to follow up in the Orthopedic Clinic in approximately 2 weeks for a wound check and possible suture removal.  Depending on her skin looks, she may need to have the sutures in for a full 21 days.  She will need x-rays at 4 weeks.  · Weight bearing:  Patient will be toe-touch weight-bearing for at least 4 weeks; then she will proceed to weight-bearing as tolerated depending on bony healing noted on her x-rays.    Appreciate hospitalist comanagment

## 2022-07-23 NOTE — ASSESSMENT & PLAN NOTE
Transfused 1U PRBC on 7/22    --Daily CBC  --Transfuse with 1 unit PRBC for Hgb <7.0 or <8.0 if symptomatic

## 2022-07-23 NOTE — PLAN OF CARE
Pt resting in bed, remains free of falls and injuries this shift. VSS. No obvious s/sx of distress noted. Pain controlled with PRN medications. POC reviewed with the patient, verbalized understanding. No further needs at this time, call light within reach.Will continue POC as ordered.

## 2022-07-23 NOTE — PROGRESS NOTES
[facility] Medicine  Progress Note    Patient Name: Rosy Balderrama  MRN: [id number]  Patient Class: IP- Inpatient   Admission Date: 7/19/2022  Length of Stay: 4 days  Attending Physician: Toi Plummer MD  Primary Care Provider: Maude Barrios MD        Subjective:     Principal Problem:Periprosthetic fracture of femur following total replacement of hip        HPI:  Patient is a 89-year-old  female with past medical history of dementia, AFib, diabetes, hyperlipidemia who presents to the ED today after suffering a fall this morning.  History obtained via son due to patient's history of dementia.  When questioned on what happened, patient stated I am not sure ask my children.  Patient suffered a fall after going to the bathroom this a.m..  She was then taken to the ED.    In the ED, x-rays reveal spiral fracture left proximal femur with significant displacement.  She was given 1 dose of Rocephin for UTI.  Ortho notified. Hospital Medicine consulted and patient admitted.       Overview/Hospital Course:  7/20: POD 1, status post operative fixation of left periprosthetic femur fracture. Eliquis started for VTE prophylaxis. Will need snf placement.   7/21: POD 2, status post operative fixation of left periprosthetic femur fracture. Cont eliquis. Hgb 6.9 this am. Not suspecting active bleed. Likely attributed to expected blood loss from surgery. Due to critical blood shortage, will not transfuse at this time as it is not emergent. Vital signs stable. Will repeat h/h this afternoon, may consider transfusing at that time if h/h continues to trend down.   7/22: POD 3, 1 unit prbc today. Hgb 6.3. SNF placement pending.     7/23: Pending SNF placement, ordered repeat CBC, vitals stable.       Interval History: Pending SNF placement    Review of Systems   Constitutional:  Positive for activity change and fatigue. Negative for appetite change, chills, diaphoresis, fever and unexpected weight change.    HENT:  Negative for congestion, hearing loss, mouth sores, postnasal drip, rhinorrhea, sore throat and trouble swallowing.    Eyes:  Negative for discharge and visual disturbance.   Respiratory:  Negative for cough, chest tightness and shortness of breath.    Cardiovascular:  Negative for chest pain, palpitations and leg swelling.   Gastrointestinal:  Negative for abdominal distention, blood in stool, constipation, diarrhea, nausea and vomiting.   Endocrine: Negative for cold intolerance and heat intolerance.   Genitourinary:  Negative for difficulty urinating, dyspareunia, flank pain and hematuria.   Musculoskeletal:  Positive for arthralgias, back pain and myalgias.   Skin: Negative.    Neurological:  Positive for weakness. Negative for dizziness, light-headedness and headaches.   Hematological:  Negative for adenopathy. Does not bruise/bleed easily.   Psychiatric/Behavioral:  Negative for agitation, behavioral problems and confusion. The patient is not nervous/anxious.    Objective:     Vital Signs (Most Recent):  Temp: 97.9 °F (36.6 °C) (07/23/22 1210)  Pulse: 76 (07/23/22 1210)  Resp: 18 (07/23/22 1210)  BP: 138/71 (07/23/22 1210)  SpO2: 96 % (07/23/22 1210) Vital Signs (24h Range):  Temp:  [97.7 °F (36.5 °C)-99.7 °F (37.6 °C)] 97.9 °F (36.6 °C)  Pulse:  [72-79] 76  Resp:  [16-18] 18  SpO2:  [93 %-96 %] 96 %  BP: (130-176)/(60-73) 138/71     Weight: 68 kg (149 lb 14.6 oz)  Body mass index is 29.28 kg/m².    Intake/Output Summary (Last 24 hours) at 7/23/2022 1429  Last data filed at 7/23/2022 0845  Gross per 24 hour   Intake 2971.37 ml   Output 1125 ml   Net 1846.37 ml      Physical Exam  Vitals and nursing note reviewed.   Constitutional:       General: She is not in acute distress.     Appearance: She is well-developed.   HENT:      Head: Normocephalic and atraumatic.      Right Ear: Hearing and external ear normal.      Left Ear: Hearing and external ear normal.      Nose: No rhinorrhea.      Right Sinus:  No maxillary sinus tenderness or frontal sinus tenderness.      Left Sinus: No maxillary sinus tenderness or frontal sinus tenderness.      Mouth/Throat:      Mouth: No oral lesions.      Pharynx: Uvula midline.   Eyes:      General:         Right eye: No discharge.         Left eye: No discharge.      Conjunctiva/sclera: Conjunctivae normal.      Pupils: Pupils are equal, round, and reactive to light.   Neck:      Thyroid: No thyromegaly.      Vascular: No carotid bruit.      Trachea: No tracheal deviation.   Cardiovascular:      Rate and Rhythm: Normal rate and regular rhythm.      Pulses:           Dorsalis pedis pulses are 2+ on the right side and 2+ on the left side.      Heart sounds: Normal heart sounds, S1 normal and S2 normal. No murmur heard.  Pulmonary:      Effort: Pulmonary effort is normal. No respiratory distress.      Breath sounds: Normal breath sounds.   Chest:   Breasts:      Right: No supraclavicular adenopathy.      Left: No supraclavicular adenopathy.     Abdominal:      General: Bowel sounds are normal. There is no distension.      Palpations: Abdomen is soft. There is no mass.      Tenderness: There is no abdominal tenderness.   Musculoskeletal:      Cervical back: Normal range of motion.      Left hip: Decreased range of motion.   Lymphadenopathy:      Cervical: No cervical adenopathy.      Upper Body:      Right upper body: No supraclavicular adenopathy.      Left upper body: No supraclavicular adenopathy.   Skin:     General: Skin is warm and dry.      Capillary Refill: Capillary refill takes less than 2 seconds.      Coloration: Skin is pale.      Findings: No rash.          Neurological:      Mental Status: She is alert and oriented to person, place, and time.      Sensory: No sensory deficit.      Coordination: Coordination normal.      Gait: Gait normal.   Psychiatric:         Mood and Affect: Mood is not anxious or depressed.         Speech: Speech normal.         Behavior: Behavior  normal.         Thought Content: Thought content normal.         Judgment: Judgment normal.       Significant Labs: All pertinent labs within the past 24 hours have been reviewed.  CBC:   Recent Labs   Lab 07/21/22  1530 07/22/22  0817   WBC  --  6.40   HGB 6.5* 6.3*   HCT 20.0* 20.1*   PLT  --  116*       Significant Imaging: I have reviewed all pertinent imaging results/findings within the past 24 hours.      Assessment/Plan:      * Periprosthetic fracture of femur following total replacement of hip  7/22:  POD 3  Pt/ot on case  SNF placement needed   Case management on case  eliquis started for VTE prophylaxis  Pt complains of pain  Will change to oxycodone PRN      Anemia  Transfused 1U PRBC on 7/22    --Daily CBC  --Transfuse with 1 unit PRBC for Hgb <7.0 or <8.0 if symptomatic     Closed left femoral fracture  Displaced proximal femoral fracture  Oxycodone for pain control  Ortho consult on case            Osteoporosis  Resume home meds      Paroxysmal atrial fibrillation  Continue home metoprolol  Patient not on anticoagulation  No history of bleeding reported  Demar Vasc score 4  Will likely need short term OAC VTE prophylaxis from Orthopedic surgery  Will defer long term anticoagulation to PCP      Hyperlipidemia  Resume statin    Diabetes mellitus type 2, controlled, without complications  Diabetic diet  Sliding scale  Hold home diabetic meds        VTE Risk Mitigation (From admission, onward)         Ordered     apixaban tablet 2.5 mg  2 times daily         07/20/22 0908     Place KIA hose  Until discontinued         07/19/22 2132     Place sequential compression device  Until discontinued         07/19/22 2132     IP VTE HIGH RISK PATIENT  Once         07/19/22 2132                Discharge Planning   ART:      Code Status: Full Code   Is the patient medically ready for discharge?:     Reason for patient still in hospital (select all that apply): Patient trending condition  Discharge Plan A: Home with  family, Home Health                  April J HONEY Cruz  Department of Hospital Medicine   [facility] - Med Surg

## 2022-07-24 LAB
POCT GLUCOSE: 133 MG/DL (ref 70–110)
POCT GLUCOSE: 145 MG/DL (ref 70–110)
POCT GLUCOSE: 205 MG/DL (ref 70–110)
POCT GLUCOSE: 208 MG/DL (ref 70–110)

## 2022-07-24 PROCEDURE — 11000001 HC ACUTE MED/SURG PRIVATE ROOM

## 2022-07-24 PROCEDURE — 25000003 PHARM REV CODE 250: Performed by: FAMILY MEDICINE

## 2022-07-24 PROCEDURE — 25000003 PHARM REV CODE 250: Performed by: ORTHOPAEDIC SURGERY

## 2022-07-24 RX ADMIN — Medication 1000 UNITS: at 08:07

## 2022-07-24 RX ADMIN — PRAVASTATIN SODIUM 40 MG: 20 TABLET ORAL at 08:07

## 2022-07-24 RX ADMIN — MEMANTINE 10 MG: 10 TABLET ORAL at 08:07

## 2022-07-24 RX ADMIN — DOCUSATE SODIUM 100 MG: 100 CAPSULE, LIQUID FILLED ORAL at 09:07

## 2022-07-24 RX ADMIN — CHLORHEXIDINE GLUCONATE 0.12% ORAL RINSE 10 ML: 1.2 LIQUID ORAL at 08:07

## 2022-07-24 RX ADMIN — METOPROLOL SUCCINATE 25 MG: 25 TABLET, FILM COATED, EXTENDED RELEASE ORAL at 08:07

## 2022-07-24 RX ADMIN — ACETAMINOPHEN 1000 MG: 500 TABLET ORAL at 05:07

## 2022-07-24 RX ADMIN — ACETAMINOPHEN 1000 MG: 500 TABLET ORAL at 09:07

## 2022-07-24 RX ADMIN — APIXABAN 2.5 MG: 2.5 TABLET, FILM COATED ORAL at 08:07

## 2022-07-24 RX ADMIN — RALOXIFENE 60 MG: 60 TABLET ORAL at 08:07

## 2022-07-24 RX ADMIN — DOCUSATE SODIUM 100 MG: 100 CAPSULE, LIQUID FILLED ORAL at 08:07

## 2022-07-24 RX ADMIN — MEMANTINE 10 MG: 10 TABLET ORAL at 09:07

## 2022-07-24 RX ADMIN — INSULIN ASPART 1 UNITS: 100 INJECTION, SOLUTION INTRAVENOUS; SUBCUTANEOUS at 09:07

## 2022-07-24 RX ADMIN — POLYETHYLENE GLYCOL 3350 17 G: 17 POWDER, FOR SOLUTION ORAL at 09:07

## 2022-07-24 RX ADMIN — QUETIAPINE FUMARATE 25 MG: 25 TABLET ORAL at 09:07

## 2022-07-24 RX ADMIN — OXYCODONE HYDROCHLORIDE 5 MG: 5 TABLET ORAL at 01:07

## 2022-07-24 RX ADMIN — APIXABAN 2.5 MG: 2.5 TABLET, FILM COATED ORAL at 09:07

## 2022-07-24 RX ADMIN — OXYCODONE HYDROCHLORIDE 5 MG: 5 TABLET ORAL at 07:07

## 2022-07-24 RX ADMIN — ACETAMINOPHEN 1000 MG: 500 TABLET ORAL at 01:07

## 2022-07-24 RX ADMIN — INSULIN ASPART 2 UNITS: 100 INJECTION, SOLUTION INTRAVENOUS; SUBCUTANEOUS at 12:07

## 2022-07-24 RX ADMIN — OXYCODONE HYDROCHLORIDE 5 MG: 5 TABLET ORAL at 08:07

## 2022-07-24 RX ADMIN — POLYETHYLENE GLYCOL 3350 17 G: 17 POWDER, FOR SOLUTION ORAL at 08:07

## 2022-07-24 NOTE — PLAN OF CARE
Thank you for your consult to [facility]. We have reviewed the patient chart. This patient does meet criteria for [facility] service at this time. Will assume care on 07/24/22 at 7AM.

## 2022-07-24 NOTE — NURSING
Spoke with ortho, verbal order received to remove BEAN and change dressing to left hip. Premedicated with prn pain medication, pt tolerated fairly well. No bleeding from site noted at this time. Incision with sutures noted, no redness, swelling or active drainage at this time. Covered with new aquacel. No s/s of distress.

## 2022-07-25 PROBLEM — U07.1 COVID-19: Status: ACTIVE | Noted: 2022-07-25

## 2022-07-25 LAB
ALBUMIN SERPL BCP-MCNC: 2.7 G/DL (ref 3.5–5.2)
ALP SERPL-CCNC: 110 U/L (ref 55–135)
ALT SERPL W/O P-5'-P-CCNC: 62 U/L (ref 10–44)
ANION GAP SERPL CALC-SCNC: 10 MMOL/L (ref 8–16)
APTT BLDCRRT: 26.5 SEC (ref 21–32)
AST SERPL-CCNC: 80 U/L (ref 10–40)
BASOPHILS # BLD AUTO: 0.01 K/UL (ref 0–0.2)
BASOPHILS NFR BLD: 0.1 % (ref 0–1.9)
BILIRUB SERPL-MCNC: 0.9 MG/DL (ref 0.1–1)
BNP SERPL-MCNC: 169 PG/ML (ref 0–99)
BUN SERPL-MCNC: 13 MG/DL (ref 8–23)
CALCIUM SERPL-MCNC: 8.5 MG/DL (ref 8.7–10.5)
CHLORIDE SERPL-SCNC: 111 MMOL/L (ref 95–110)
CO2 SERPL-SCNC: 19 MMOL/L (ref 23–29)
CREAT SERPL-MCNC: 0.6 MG/DL (ref 0.5–1.4)
D DIMER PPP IA.FEU-MCNC: 3.61 MG/L FEU
DIFFERENTIAL METHOD: ABNORMAL
EOSINOPHIL # BLD AUTO: 0.3 K/UL (ref 0–0.5)
EOSINOPHIL NFR BLD: 4 % (ref 0–8)
ERYTHROCYTE [DISTWIDTH] IN BLOOD BY AUTOMATED COUNT: 17.8 % (ref 11.5–14.5)
ERYTHROCYTE [SEDIMENTATION RATE] IN BLOOD BY WESTERGREN METHOD: 80 MM/HR (ref 0–20)
EST. GFR  (AFRICAN AMERICAN): >60 ML/MIN/1.73 M^2
EST. GFR  (NON AFRICAN AMERICAN): >60 ML/MIN/1.73 M^2
FERRITIN SERPL-MCNC: 359 NG/ML (ref 20–300)
GLUCOSE SERPL-MCNC: 155 MG/DL (ref 70–110)
HCT VFR BLD AUTO: 32.8 % (ref 37–48.5)
HGB BLD-MCNC: 10.9 G/DL (ref 12–16)
IMM GRANULOCYTES # BLD AUTO: 0.16 K/UL (ref 0–0.04)
IMM GRANULOCYTES NFR BLD AUTO: 2.2 % (ref 0–0.5)
INR PPP: 1 (ref 0.8–1.2)
LACTATE SERPL-SCNC: 1.5 MMOL/L (ref 0.5–2.2)
LDH SERPL L TO P-CCNC: 254 U/L (ref 110–260)
LYMPHOCYTES # BLD AUTO: 0.5 K/UL (ref 1–4.8)
LYMPHOCYTES NFR BLD: 6.8 % (ref 18–48)
MAGNESIUM SERPL-MCNC: 2.1 MG/DL (ref 1.6–2.6)
MCH RBC QN AUTO: 31.4 PG (ref 27–31)
MCHC RBC AUTO-ENTMCNC: 33.2 G/DL (ref 32–36)
MCV RBC AUTO: 95 FL (ref 82–98)
MONOCYTES # BLD AUTO: 0.4 K/UL (ref 0.3–1)
MONOCYTES NFR BLD: 6 % (ref 4–15)
NEUTROPHILS # BLD AUTO: 5.9 K/UL (ref 1.8–7.7)
NEUTROPHILS NFR BLD: 80.9 % (ref 38–73)
NRBC BLD-RTO: 0 /100 WBC
PLATELET # BLD AUTO: 143 K/UL (ref 150–450)
PMV BLD AUTO: 11 FL (ref 9.2–12.9)
POCT GLUCOSE: 190 MG/DL (ref 70–110)
POCT GLUCOSE: 202 MG/DL (ref 70–110)
POCT GLUCOSE: 208 MG/DL (ref 70–110)
POTASSIUM SERPL-SCNC: 5.4 MMOL/L (ref 3.5–5.1)
PROT SERPL-MCNC: 5.7 G/DL (ref 6–8.4)
PROTHROMBIN TIME: 11 SEC (ref 9–12.5)
RBC # BLD AUTO: 3.47 M/UL (ref 4–5.4)
SARS-COV-2 RDRP RESP QL NAA+PROBE: POSITIVE
SODIUM SERPL-SCNC: 140 MMOL/L (ref 136–145)
TROPONIN I SERPL DL<=0.01 NG/ML-MCNC: 0.02 NG/ML (ref 0–0.03)
WBC # BLD AUTO: 7.3 K/UL (ref 3.9–12.7)

## 2022-07-25 PROCEDURE — 36415 COLL VENOUS BLD VENIPUNCTURE: CPT | Performed by: NURSE PRACTITIONER

## 2022-07-25 PROCEDURE — 85379 FIBRIN DEGRADATION QUANT: CPT | Performed by: NURSE PRACTITIONER

## 2022-07-25 PROCEDURE — 94761 N-INVAS EAR/PLS OXIMETRY MLT: CPT

## 2022-07-25 PROCEDURE — 82728 ASSAY OF FERRITIN: CPT | Performed by: NURSE PRACTITIONER

## 2022-07-25 PROCEDURE — 25000242 PHARM REV CODE 250 ALT 637 W/ HCPCS: Performed by: NURSE PRACTITIONER

## 2022-07-25 PROCEDURE — 99900035 HC TECH TIME PER 15 MIN (STAT)

## 2022-07-25 PROCEDURE — 85025 COMPLETE CBC W/AUTO DIFF WBC: CPT | Performed by: NURSE PRACTITIONER

## 2022-07-25 PROCEDURE — 83735 ASSAY OF MAGNESIUM: CPT | Performed by: NURSE PRACTITIONER

## 2022-07-25 PROCEDURE — 25000003 PHARM REV CODE 250: Performed by: NURSE PRACTITIONER

## 2022-07-25 PROCEDURE — 85730 THROMBOPLASTIN TIME PARTIAL: CPT | Performed by: NURSE PRACTITIONER

## 2022-07-25 PROCEDURE — 25000003 PHARM REV CODE 250: Performed by: ORTHOPAEDIC SURGERY

## 2022-07-25 PROCEDURE — 25000003 PHARM REV CODE 250: Performed by: FAMILY MEDICINE

## 2022-07-25 PROCEDURE — 85610 PROTHROMBIN TIME: CPT | Performed by: NURSE PRACTITIONER

## 2022-07-25 PROCEDURE — 83880 ASSAY OF NATRIURETIC PEPTIDE: CPT | Performed by: NURSE PRACTITIONER

## 2022-07-25 PROCEDURE — 21400001 HC TELEMETRY ROOM

## 2022-07-25 PROCEDURE — 83605 ASSAY OF LACTIC ACID: CPT | Performed by: NURSE PRACTITIONER

## 2022-07-25 PROCEDURE — 94640 AIRWAY INHALATION TREATMENT: CPT

## 2022-07-25 PROCEDURE — 63600175 PHARM REV CODE 636 W HCPCS: Performed by: FAMILY MEDICINE

## 2022-07-25 PROCEDURE — 85651 RBC SED RATE NONAUTOMATED: CPT | Performed by: NURSE PRACTITIONER

## 2022-07-25 PROCEDURE — 84484 ASSAY OF TROPONIN QUANT: CPT | Performed by: NURSE PRACTITIONER

## 2022-07-25 PROCEDURE — 80053 COMPREHEN METABOLIC PANEL: CPT | Performed by: NURSE PRACTITIONER

## 2022-07-25 PROCEDURE — 83615 LACTATE (LD) (LDH) ENZYME: CPT | Performed by: NURSE PRACTITIONER

## 2022-07-25 PROCEDURE — U0002 COVID-19 LAB TEST NON-CDC: HCPCS | Performed by: NURSE PRACTITIONER

## 2022-07-25 PROCEDURE — 27000207 HC ISOLATION

## 2022-07-25 RX ORDER — SODIUM CHLORIDE 0.9 % (FLUSH) 0.9 %
10 SYRINGE (ML) INJECTION
Status: DISCONTINUED | OUTPATIENT
Start: 2022-07-25 | End: 2022-07-27 | Stop reason: HOSPADM

## 2022-07-25 RX ORDER — ASCORBIC ACID 500 MG
500 TABLET ORAL 2 TIMES DAILY
Status: DISCONTINUED | OUTPATIENT
Start: 2022-07-25 | End: 2022-07-27 | Stop reason: HOSPADM

## 2022-07-25 RX ORDER — ALBUTEROL SULFATE 90 UG/1
2 AEROSOL, METERED RESPIRATORY (INHALATION) EVERY 6 HOURS
Status: DISCONTINUED | OUTPATIENT
Start: 2022-07-25 | End: 2022-07-27 | Stop reason: HOSPADM

## 2022-07-25 RX ADMIN — ACETAMINOPHEN 1000 MG: 500 TABLET ORAL at 06:07

## 2022-07-25 RX ADMIN — MEMANTINE 10 MG: 10 TABLET ORAL at 11:07

## 2022-07-25 RX ADMIN — Medication 1000 UNITS: at 10:07

## 2022-07-25 RX ADMIN — DOCUSATE SODIUM 100 MG: 100 CAPSULE, LIQUID FILLED ORAL at 08:07

## 2022-07-25 RX ADMIN — RALOXIFENE 60 MG: 60 TABLET ORAL at 12:07

## 2022-07-25 RX ADMIN — APIXABAN 2.5 MG: 2.5 TABLET, FILM COATED ORAL at 08:07

## 2022-07-25 RX ADMIN — ALBUTEROL SULFATE 2 PUFF: 90 AEROSOL, METERED RESPIRATORY (INHALATION) at 07:07

## 2022-07-25 RX ADMIN — OXYCODONE HYDROCHLORIDE AND ACETAMINOPHEN 500 MG: 500 TABLET ORAL at 11:07

## 2022-07-25 RX ADMIN — OXYCODONE HYDROCHLORIDE AND ACETAMINOPHEN 500 MG: 500 TABLET ORAL at 08:07

## 2022-07-25 RX ADMIN — ALBUTEROL SULFATE 2 PUFF: 90 AEROSOL, METERED RESPIRATORY (INHALATION) at 01:07

## 2022-07-25 RX ADMIN — ACETAMINOPHEN 1000 MG: 500 TABLET ORAL at 03:07

## 2022-07-25 RX ADMIN — ACETAMINOPHEN 1000 MG: 500 TABLET ORAL at 09:07

## 2022-07-25 RX ADMIN — METOPROLOL SUCCINATE 25 MG: 25 TABLET, FILM COATED, EXTENDED RELEASE ORAL at 10:07

## 2022-07-25 RX ADMIN — OXYCODONE HYDROCHLORIDE 5 MG: 5 TABLET ORAL at 11:07

## 2022-07-25 RX ADMIN — THERA TABS 1 TABLET: TAB at 10:07

## 2022-07-25 RX ADMIN — OXYCODONE HYDROCHLORIDE 5 MG: 5 TABLET ORAL at 03:07

## 2022-07-25 RX ADMIN — POLYETHYLENE GLYCOL 3350 17 G: 17 POWDER, FOR SOLUTION ORAL at 11:07

## 2022-07-25 RX ADMIN — APIXABAN 2.5 MG: 2.5 TABLET, FILM COATED ORAL at 11:07

## 2022-07-25 RX ADMIN — PRAVASTATIN SODIUM 40 MG: 20 TABLET ORAL at 11:07

## 2022-07-25 RX ADMIN — MEMANTINE 10 MG: 10 TABLET ORAL at 08:07

## 2022-07-25 RX ADMIN — INSULIN ASPART 2 UNITS: 100 INJECTION, SOLUTION INTRAVENOUS; SUBCUTANEOUS at 07:07

## 2022-07-25 RX ADMIN — QUETIAPINE FUMARATE 25 MG: 25 TABLET ORAL at 08:07

## 2022-07-25 RX ADMIN — OXYCODONE HYDROCHLORIDE 5 MG: 5 TABLET ORAL at 08:07

## 2022-07-25 RX ADMIN — DOCUSATE SODIUM 100 MG: 100 CAPSULE, LIQUID FILLED ORAL at 11:07

## 2022-07-25 RX ADMIN — POLYETHYLENE GLYCOL 3350 17 G: 17 POWDER, FOR SOLUTION ORAL at 08:07

## 2022-07-25 RX ADMIN — SODIUM CHLORIDE: 0.9 INJECTION, SOLUTION INTRAVENOUS at 11:07

## 2022-07-25 NOTE — PHYSICIAN QUERY
PT Name: Rosy Balderrama  MR #: [id number]    DOCUMENTATION CLARIFICATION      CDS/: Rosario Olvera RN, CDS   Contact Information: [email or web address]    This form is a permanent document in the medical record.      Query Date: July 25, 2022    By submitting this query, we are merely seeking further clarification of documentation. Please utilize your independent clinical judgment when addressing the question(s) below.    The Medical Record contains the following:   Indicators  Supporting Clinical Findings Location in Medical Record   x Anemia documented Hgb 6.9 this am. Not suspecting active bleed. Likely attributed to expected blood loss from surgery  Acute on Chronic Anemia   Hospital Medicine 7/22    Orthopedic surgery 7/22   x H&H H&H  8.8/27.7  H&H  6.5/20.0  H&H  6.3/20.1 Labs 7/19  Labs 7/21  Labs 7/22   x BP                    HR BP: (125-137)/(69-71)   Pulse:  [54-66]  BP: ()/(53-92)   Pulse:  [66-90]  BP: ()/(48-63)    Pulse:  [84-96]   BP: (130-176)/(60-73)  Pulse:  [72-79]  Hospital Medicine H&P  Hospital Medicine 7/20  [facility] Medicine 7/21  [facility] Medicine 7/24    GI bleeding documented      Acute bleeding (Non GI site)     x Transfusion(s) Transfused with 3 units PRBC's Blood bank record 7/19   x Acute/Chronic illness Closed left femoral fracture  DM2 OP, PAF Hospital Medicine H&P    Treatments     x Other PROCEDURES PERFORMED: Operative fixation of  Left periprosthetic femur fracture   mL OP note 7/19     Provider, please specify diagnosis or diagnoses associated with above clinical findings.     [  x ] Acute blood loss anemia expected post-operatively      [   ] Other Hematological Diagnosis (please specify): _________________     [   ] Clinically Undetermined            Please document in your progress notes daily for the duration of treatment, until resolved, and include in your discharge summary.    Form No. [id number]

## 2022-07-25 NOTE — PROGRESS NOTES
[facility] Medicine  Progress Note    Patient Name: Rosy Balderrama  MRN: [id number]  Patient Class: IP- Inpatient   Admission Date: 7/19/2022  Length of Stay: 6 days  Attending Physician: Noble Nguyễn MD  Primary Care Provider: Maude Barrios MD        Subjective:     Principal Problem:Periprosthetic fracture of femur following total replacement of hip        HPI:  Patient is a 89-year-old  female with past medical history of dementia, AFib, diabetes, hyperlipidemia who presents to the ED today after suffering a fall this morning.  History obtained via son due to patient's history of dementia.  When questioned on what happened, patient stated I am not sure ask my children.  Patient suffered a fall after going to the bathroom this a.m..  She was then taken to the ED.    In the ED, x-rays reveal spiral fracture left proximal femur with significant displacement.  She was given 1 dose of Rocephin for UTI.  Ortho notified. Hospital Medicine consulted and patient admitted.       Overview/Hospital Course:  7/20: POD 1, status post operative fixation of left periprosthetic femur fracture. Eliquis started for VTE prophylaxis. Will need snf placement.   7/21: POD 2, status post operative fixation of left periprosthetic femur fracture. Cont eliquis. Hgb 6.9 this am. Not suspecting active bleed. Likely attributed to expected blood loss from surgery. Due to critical blood shortage, will not transfuse at this time as it is not emergent. Vital signs stable. Will repeat h/h this afternoon, may consider transfusing at that time if h/h continues to trend down.   7/22: POD 3, 1 unit prbc today. Hgb 6.3. SNF placement pending.     7/23: Pending SNF placement, ordered repeat CBC, vitals stable.   7/24 s/p fixation of left periprosthetic femur fracture---awaiting SNF placement       Interval History: awake and alert, no new complaint. Awaiting SNF placement     Review of Systems   Constitutional:  Positive  for activity change and fatigue. Negative for appetite change, chills, diaphoresis, fever and unexpected weight change.   HENT:  Negative for congestion, hearing loss, mouth sores, postnasal drip, rhinorrhea, sore throat and trouble swallowing.    Eyes:  Negative for discharge and visual disturbance.   Respiratory:  Negative for cough, chest tightness and shortness of breath.    Cardiovascular:  Negative for chest pain, palpitations and leg swelling.   Gastrointestinal:  Negative for abdominal distention, blood in stool, constipation, diarrhea, nausea and vomiting.   Endocrine: Negative for cold intolerance and heat intolerance.   Genitourinary:  Negative for difficulty urinating, dyspareunia, flank pain and hematuria.   Musculoskeletal:  Positive for arthralgias, back pain and myalgias.   Skin: Negative.    Neurological:  Positive for weakness. Negative for dizziness, light-headedness and headaches.   Hematological:  Negative for adenopathy. Does not bruise/bleed easily.   Psychiatric/Behavioral:  Negative for agitation, behavioral problems and confusion. The patient is not nervous/anxious.    Objective:      Vital Signs (Most Recent):  Temp: 97.9 °F (36.6 °C) (07/23/22 1210)  Pulse: 76 (07/23/22 1210)  Resp: 18 (07/23/22 1210)  BP: 138/71 (07/23/22 1210)  SpO2: 96 % (07/23/22 1210) Vital Signs (24h Range):  Temp:  [97.7 °F (36.5 °C)-99.7 °F (37.6 °C)] 97.9 °F (36.6 °C)  Pulse:  [72-79] 76  Resp:  [16-18] 18  SpO2:  [93 %-96 %] 96 %  BP: (130-176)/(60-73) 138/71      Weight: 68 kg (149 lb 14.6 oz)  Body mass index is 29.28 kg/m².     Intake/Output Summary (Last 24 hours) at 7/23/2022 1429  Last data filed at 7/23/2022 0845      Gross per 24 hour   Intake 2971.37 ml   Output 1125 ml   Net 1846.37 ml      Physical Exam  Vitals and nursing note reviewed.   Constitutional:       General: She is not in acute distress.     Appearance: She is well-developed.   HENT:      Head: Normocephalic and atraumatic.      Right Ear:  Hearing and external ear normal.      Left Ear: Hearing and external ear normal.      Nose: No rhinorrhea.      Right Sinus: No maxillary sinus tenderness or frontal sinus tenderness.      Left Sinus: No maxillary sinus tenderness or frontal sinus tenderness.      Mouth/Throat:      Mouth: No oral lesions.      Pharynx: Uvula midline.   Eyes:      General:         Right eye: No discharge.         Left eye: No discharge.      Conjunctiva/sclera: Conjunctivae normal.      Pupils: Pupils are equal, round, and reactive to light.   Neck:      Thyroid: No thyromegaly.      Vascular: No carotid bruit.      Trachea: No tracheal deviation.   Cardiovascular:      Rate and Rhythm: Normal rate and regular rhythm.      Pulses:           Dorsalis pedis pulses are 2+ on the right side and 2+ on the left side.      Heart sounds: Normal heart sounds, S1 normal and S2 normal. No murmur heard.  Pulmonary:      Effort: Pulmonary effort is normal. No respiratory distress.      Breath sounds: Normal breath sounds.   Chest:   Breasts:      Right: No supraclavicular adenopathy.      Left: No supraclavicular adenopathy.      Abdominal:      General: Bowel sounds are normal. There is no distension.      Palpations: Abdomen is soft. There is no mass.      Tenderness: There is no abdominal tenderness.   Musculoskeletal:      Cervical back: Normal range of motion.      Left hip: Decreased range of motion.   Lymphadenopathy:      Cervical: No cervical adenopathy.      Upper Body:      Right upper body: No supraclavicular adenopathy.      Left upper body: No supraclavicular adenopathy.   Skin:     General: Skin is warm and dry.      Capillary Refill: Capillary refill takes less than 2 seconds.      Coloration: Skin is pale.      Findings: No rash.           Neurological:      Mental Status: She is alert and oriented to person, place, and time.      Sensory: No sensory deficit.      Coordination: Coordination normal.      Gait: Gait normal.    Psychiatric:         Mood and Affect: Mood is not anxious or depressed.         Speech: Speech normal.         Behavior: Behavior normal.         Thought Content: Thought content normal.         Judgment: Judgment normal.         Significant Labs: All pertinent labs within the past 24 hours have been reviewed.  CBC:        Recent Labs   Lab 07/21/22  1530 07/22/22  0817   WBC  --  6.40   HGB 6.5* 6.3*   HCT 20.0* 20.1*   PLT  --  116*         Significant Imaging: I have reviewed all pertinent imaging results/findings within the past 24 hours          Assessment/Plan:      * Periprosthetic fracture of femur following total replacement of hip  7/22:  POD 6  Pt/ot on case  SNF placement needed   Case management on case  eliquis started for VTE prophylaxis  Pt complains of pain  Will change to oxycodone PRN    Plan per ortho:  89-year-old female postop day 6 status postoperative fixation of left periprosthetic femur fracture     Plan:  TTWB LLE  PT/OT for ambulation/gait training  Patient will need an ortho trauma clinic follow-up 4 weeks postop      COVID-19  Incidentally found post surgery--tested for COVID for snf placement  Adequately anticoagulated   On RA    - COVID-19 testing   - Infection Control notified     - Isolation:   - Airborne, Contact and Droplet Precautions  - Cohort patients into COVID units  - N95 mask, wear eye protection  - 20 second hand hygiene              - Limit visitors per hospital policy              - Consolidating lab draws, nursing care, provider visits, and interventions    - Diagnostics: (leukopenia, hyponatremia, hyperferritinemia, elevated troponin, elevated d-dimer, age, and comorbidities are significant predictors of poor clinical outcome)  CBC, CMP, Ferritin, CRP and Portable CXR    - Management:  Supplemental O2 to maintain SpO2 >92%  Continuous/intermittent Pulse Ox  Albuterol treatment PRN              Anemia  Transfused 1U PRBC on 7/22    --Daily CBC  --Transfuse with 1 unit  PRBC for Hgb <7.0 or <8.0 if symptomatic     Closed left femoral fracture  Displaced proximal femoral fracture  Oxycodone for pain control  Ortho consult on case            Osteoporosis  Resume home meds      Paroxysmal atrial fibrillation  Continue home metoprolol  Patient not on anticoagulation  No history of bleeding reported  Demar Vasc score 4  Will likely need short term OAC VTE prophylaxis from Orthopedic surgery  Will defer long term anticoagulation to PCP      Hyperlipidemia  Resume statin    Diabetes mellitus type 2, controlled, without complications  Diabetic diet  Sliding scale  Hold home diabetic meds      VTE Risk Mitigation (From admission, onward)         Ordered     apixaban tablet 2.5 mg  2 times daily         07/20/22 0908     Place KIA hose  Until discontinued         07/19/22 2132     Place sequential compression device  Until discontinued         07/19/22 2132     IP VTE HIGH RISK PATIENT  Once         07/19/22 2132                Discharge Planning   ART:      Code Status: Full Code   Is the patient medically ready for discharge?:     Reason for patient still in hospital (select all that apply): Patient trending condition  Discharge Plan A: Home with family, Home Health                  Veronique Nam MD  Department of Hospital Medicine   [facility]

## 2022-07-25 NOTE — PROGRESS NOTES
[facility])  Orthopedics  Progress Note    Patient Name: Rosy Balderrama  MRN: [id number]  Admission Date: 7/19/2022  Hospital Length of Stay: 6 days  Attending Provider: Noble Nguyễn MD  Primary Care Provider: Maude Barrios MD  Follow-up For: Procedure(s) (LRB):  ORIF, FRACTURE, FEMUR (Left)    Post-Operative Day: 6 Days Post-Op  Subjective:     Principal Problem:Periprosthetic fracture of femur following total replacement of hip    Principal Orthopedic Problem: Periprosthetic fracture of femur following total replacement of hip    Interval History: Rosy Balderrama is a 89-year-old female postop day 6 status post left operative fixation of periprosthetic femur fracture.  Patient is resting comfortably in bed.  She reports that she has not ambulated.  No acute events overnight    Review of patient's allergies indicates:   Allergen Reactions    Alendronate Other (See Comments)    Atorvastatin Other (See Comments)    Ciprofloxacin Other (See Comments)    Codeine Itching    Diazepam Itching    Ibuprofen Other (See Comments)    Oxycodone-acetaminophen Other (See Comments) and Itching    Rofecoxib Other (See Comments)    Seldane     Simvastatin Other (See Comments)       Current Facility-Administered Medications   Medication    0.9%  NaCl infusion (for blood administration)    0.9%  NaCl infusion    acetaminophen tablet 1,000 mg    albuterol inhaler 2 puff    apixaban tablet 2.5 mg    ascorbic acid (vitamin C) tablet 500 mg    dextrose 10% bolus 125 mL    dextrose 10% bolus 250 mL    docusate sodium capsule 100 mg    glucagon (human recombinant) injection 1 mg    insulin aspart U-100 pen 0-5 Units    memantine tablet 10 mg    metoprolol succinate (TOPROL-XL) 24 hr tablet 25 mg    multivitamin tablet    ondansetron disintegrating tablet 4 mg    oxyCODONE immediate release tablet 5 mg    polyethylene glycol packet 17 g    pravastatin tablet 40 mg    QUEtiapine tablet 25 mg     raloxifene tablet 60 mg    sodium chloride 0.9% flush 10 mL    sodium chloride 0.9% flush 10 mL    traMADoL tablet 100 mg    vitamin D 1000 units tablet 1,000 Units     Objective:     Vital Signs (Most Recent):  Temp: 97.7 °F (36.5 °C) (07/25/22 0931)  Pulse: 75 (07/25/22 0931)  Resp: 18 (07/25/22 0931)  BP: (!) 153/70 (07/25/22 0931)  SpO2: 96 % (07/25/22 0931) Vital Signs (24h Range):  Temp:  [97.7 °F (36.5 °C)-99.1 °F (37.3 °C)] 97.7 °F (36.5 °C)  Pulse:  [66-75] 75  Resp:  [16-20] 18  SpO2:  [94 %-98 %] 96 %  BP: (100-170)/(54-80) 153/70     Weight: 69 kg (152 lb 1.9 oz)  Height: 5' (152.4 cm)  Body mass index is 29.71 kg/m².      Intake/Output Summary (Last 24 hours) at 7/25/2022 1018  Last data filed at 7/24/2022 1205  Gross per 24 hour   Intake 180 ml   Output --   Net 180 ml       Ortho/SPM Exam   Left lower extremity:  Aquacel dressing is clean, dry, and intact  Mild edema  Mild TTP  No pain with logroll  Motor exam normal  Calf and compartments are soft and compressible  Sensation and pulses intact    GEN: Well developed, well nourished female. AAOX3. No acute distress.   Normocephalic, atraumatic.   MARY  Breathing unlabored.  Mood and affect appropriate.      Significant Labs: All pertinent labs within the past 24 hours have been reviewed.    Significant Imaging: I have reviewed and interpreted all pertinent imaging results/findings.    Assessment/Plan:     Active Diagnoses:    Diagnosis Date Noted POA    PRINCIPAL PROBLEM:  Periprosthetic fracture of femur following total replacement of hip [M97.8XXA, Z96.649] 07/19/2022 Not Applicable    Anemia [D64.9] 07/21/2022 Yes    Paroxysmal atrial fibrillation [I48.0] 07/19/2022 Yes    Osteoporosis [M81.0] 07/19/2022 Yes    Closed left femoral fracture [S72.92XA] 07/19/2022 Yes    Hyperlipidemia [E78.5]  Yes    Diabetes mellitus type 2, controlled, without complications [E11.9]  Yes      Problems Resolved During this Admission:      Assessment:  89-year-old female postop day 6 status postoperative fixation of left periprosthetic femur fracture    Plan:  TTWB LLE  PT/OT for ambulation/gait training  Patient will need an ortho trauma clinic follow-up 4 weeks postop    Nahum David PA-C  Orthopedics  [facility] ([facility])

## 2022-07-25 NOTE — PLAN OF CARE
AAOx2. NADN.  NSR on cardiac monitor.  Pt remains free of falls  No complaints at this time.  Safety measures in place. Will continue to monitor.  Informed pt to call for assistance before getting up. Pt verbalizes understanding.   Hourly rounding complete

## 2022-07-25 NOTE — ASSESSMENT & PLAN NOTE
Incidentally found post surgery--tested for COVID for snf placement  Adequately anticoagulated   On RA    - COVID-19 testing   - Infection Control notified     - Isolation:   - Airborne, Contact and Droplet Precautions  - Cohort patients into COVID units  - N95 mask, wear eye protection  - 20 second hand hygiene              - Limit visitors per hospital policy              - Consolidating lab draws, nursing care, provider visits, and interventions    - Diagnostics: (leukopenia, hyponatremia, hyperferritinemia, elevated troponin, elevated d-dimer, age, and comorbidities are significant predictors of poor clinical outcome)  CBC, CMP, Ferritin, CRP and Portable CXR    - Management:  Supplemental O2 to maintain SpO2 >92%  Continuous/intermittent Pulse Ox  Albuterol treatment PRN

## 2022-07-25 NOTE — PROGRESS NOTES
[facility] Medicine  Telemedicine Progress Note    Patient Name: Rosy Balderrama  MRN: [id number]  Patient Class: IP- Inpatient   Admission Date: 7/19/2022  Length of Stay: 6 days  Attending Physician: Noble Nguyễn MD  Primary Care Provider: Maude Barrios MD          Subjective:     Principal Problem:Periprosthetic fracture of femur following total replacement of hip        HPI:  Patient is a 89-year-old  female with past medical history of dementia, AFib, diabetes, hyperlipidemia who presents to the ED today after suffering a fall this morning.  History obtained via son due to patient's history of dementia.  When questioned on what happened, patient stated I am not sure ask my children.  Patient suffered a fall after going to the bathroom this a.m..  She was then taken to the ED.    In the ED, x-rays reveal spiral fracture left proximal femur with significant displacement.  She was given 1 dose of Rocephin for UTI.  Ortho notified. Hospital Medicine consulted and patient admitted.       Overview/Hospital Course:  7/20: POD 1, status post operative fixation of left periprosthetic femur fracture. Eliquis started for VTE prophylaxis. Will need snf placement.   7/21: POD 2, status post operative fixation of left periprosthetic femur fracture. Cont eliquis. Hgb 6.9 this am. Not suspecting active bleed. Likely attributed to expected blood loss from surgery. Due to critical blood shortage, will not transfuse at this time as it is not emergent. Vital signs stable. Will repeat h/h this afternoon, may consider transfusing at that time if h/h continues to trend down.   7/22: POD 3, 1 unit prbc today. Hgb 6.3. SNF placement pending.     7/23: Pending SNF placement, ordered repeat CBC, vitals stable.   7/25: Postop day 6 status postoperative fixation of left periprosthetic femur fracture---awaiting placement           Interval History: Seen this am, no distress noted. Awaiting  placement.     Review of Systems   Constitutional:  Positive for fatigue. Negative for fever.   HENT:  Negative for trouble swallowing.    Eyes:  Negative for visual disturbance.   Gastrointestinal:  Negative for diarrhea, nausea and vomiting.   Genitourinary:  Negative for hematuria.   Musculoskeletal:  Positive for arthralgias, gait problem and myalgias.   Skin:  Positive for wound.   Neurological:  Positive for weakness.   Hematological:  Does not bruise/bleed easily.   Psychiatric/Behavioral:  Negative for confusion.    Objective:     Vital Signs (Most Recent):  Temp: 98.1 °F (36.7 °C) (07/25/22 1120)  Pulse: 72 (07/25/22 1120)  Resp: 18 (07/25/22 1120)  BP: (!) 141/59 (07/25/22 1120)  SpO2: 96 % (07/25/22 1120)   Vital Signs (24h Range):  Temp:  [97.7 °F (36.5 °C)-99.1 °F (37.3 °C)] 98.1 °F (36.7 °C)  Pulse:  [66-75] 72  Resp:  [16-20] 18  SpO2:  [94 %-98 %] 96 %  BP: (100-170)/(54-80) 141/59     Weight: 69 kg (152 lb 1.9 oz)  Body mass index is 29.71 kg/m².    Intake/Output Summary (Last 24 hours) at 7/25/2022 1141  Last data filed at 7/24/2022 1205  Gross per 24 hour   Intake 180 ml   Output --   Net 180 ml      Physical Exam  Vitals and nursing note reviewed.   Constitutional:       Appearance: She is obese.   HENT:      Head: Normocephalic and atraumatic.      Nose: Nose normal.      Mouth/Throat:      Mouth: Mucous membranes are moist.   Eyes:      Extraocular Movements: Extraocular movements intact.   Cardiovascular:      Rate and Rhythm: Normal rate.      Pulses: Normal pulses.   Pulmonary:      Effort: Pulmonary effort is normal.   Musculoskeletal:         General: Normal range of motion.      Cervical back: Normal range of motion and neck supple.   Skin:     General: Skin is warm and dry.   Neurological:      Mental Status: She is alert and oriented to person, place, and time.   Psychiatric:         Mood and Affect: Mood normal.         Behavior: Behavior normal.       Significant Labs: All pertinent  labs within the past 24 hours have been reviewed.    Significant Imaging: I have reviewed all pertinent imaging results/findings within the past 24 hours.      Assessment/Plan:      * Periprosthetic fracture of femur following total replacement of hip  7/22:  POD 6  Pt/ot on case  SNF placement needed   Case management on case  eliquis started for VTE prophylaxis  Pt complains of pain  Will change to oxycodone PRN    Plan per ortho:  89-year-old female postop day 6 status postoperative fixation of left periprosthetic femur fracture     Plan:  TTWB LLE  PT/OT for ambulation/gait training  Patient will need an ortho trauma clinic follow-up 4 weeks postop      COVID-19  Incidentally found post surgery--tested for COVID for snf placement  Adequately anticoagulated   On RA    - COVID-19 testing   - Infection Control notified     - Isolation:   - Airborne, Contact and Droplet Precautions  - Cohort patients into COVID units  - N95 mask, wear eye protection  - 20 second hand hygiene              - Limit visitors per hospital policy              - Consolidating lab draws, nursing care, provider visits, and interventions    - Diagnostics: (leukopenia, hyponatremia, hyperferritinemia, elevated troponin, elevated d-dimer, age, and comorbidities are significant predictors of poor clinical outcome)  CBC, CMP, Ferritin, CRP and Portable CXR    - Management:  Supplemental O2 to maintain SpO2 >92%  Continuous/intermittent Pulse Ox  Albuterol treatment PRN              Anemia  Transfused 1U PRBC on 7/22    --Daily CBC  --Transfuse with 1 unit PRBC for Hgb <7.0 or <8.0 if symptomatic     Closed left femoral fracture  Displaced proximal femoral fracture  Oxycodone for pain control  Ortho consult on case            Osteoporosis  Resume home meds      Paroxysmal atrial fibrillation  Continue home metoprolol  Patient not on anticoagulation  No history of bleeding reported  Demar Vasc score 4  Will likely need short term OAC VTE prophylaxis  from Orthopedic surgery  Will defer long term anticoagulation to PCP      Hyperlipidemia  Resume statin    Diabetes mellitus type 2, controlled, without complications  Diabetic diet  Sliding scale  Hold home diabetic meds      VTE Risk Mitigation (From admission, onward)         Ordered     apixaban tablet 2.5 mg  2 times daily         07/20/22 0908     Place KIA hose  Until discontinued         07/19/22 2132     Place sequential compression device  Until discontinued         07/19/22 2132     IP VTE HIGH RISK PATIENT  Once         07/19/22 2132                      I have assessed these finding virtually using telemed platform and with assistance of bedside nurse                 The attending portion of this evaluation, treatment, and documentation was performed per Leobardo Deras NP via Telemedicine AudioVisual using the secure [email or web address] software platform with 2 way audio/video. The provider was located off-site and the patient is located in the hospital. The aforementioned video software was utilized to document the relevant history and physical exam    Leobardo Deras NP  Department of Hospital Medicine   [facility] ([facility])

## 2022-07-25 NOTE — ASSESSMENT & PLAN NOTE
7/22:  POD 6  Pt/ot on case  SNF placement needed   Case management on case  eliquis started for VTE prophylaxis  Pt complains of pain  Will change to oxycodone PRN    Plan per ortho:  89-year-old female postop day 6 status postoperative fixation of left periprosthetic femur fracture     Plan:  TTWB LLE  PT/OT for ambulation/gait training  Patient will need an ortho trauma clinic follow-up 4 weeks postop

## 2022-07-25 NOTE — SUBJECTIVE & OBJECTIVE
Interval History: Seen this am, no distress noted. Awaiting placement.     Review of Systems   Constitutional:  Positive for fatigue. Negative for fever.   HENT:  Negative for trouble swallowing.    Eyes:  Negative for visual disturbance.   Gastrointestinal:  Negative for diarrhea, nausea and vomiting.   Genitourinary:  Negative for hematuria.   Musculoskeletal:  Positive for arthralgias, gait problem and myalgias.   Skin:  Positive for wound.   Neurological:  Positive for weakness.   Hematological:  Does not bruise/bleed easily.   Psychiatric/Behavioral:  Negative for confusion.    Objective:     Vital Signs (Most Recent):  Temp: 98.1 °F (36.7 °C) (07/25/22 1120)  Pulse: 72 (07/25/22 1120)  Resp: 18 (07/25/22 1120)  BP: (!) 141/59 (07/25/22 1120)  SpO2: 96 % (07/25/22 1120)   Vital Signs (24h Range):  Temp:  [97.7 °F (36.5 °C)-99.1 °F (37.3 °C)] 98.1 °F (36.7 °C)  Pulse:  [66-75] 72  Resp:  [16-20] 18  SpO2:  [94 %-98 %] 96 %  BP: (100-170)/(54-80) 141/59     Weight: 69 kg (152 lb 1.9 oz)  Body mass index is 29.71 kg/m².    Intake/Output Summary (Last 24 hours) at 7/25/2022 1141  Last data filed at 7/24/2022 1205  Gross per 24 hour   Intake 180 ml   Output --   Net 180 ml      Physical Exam  Vitals and nursing note reviewed.   Constitutional:       Appearance: She is obese.   HENT:      Head: Normocephalic and atraumatic.      Nose: Nose normal.      Mouth/Throat:      Mouth: Mucous membranes are moist.   Eyes:      Extraocular Movements: Extraocular movements intact.   Cardiovascular:      Rate and Rhythm: Normal rate.      Pulses: Normal pulses.   Pulmonary:      Effort: Pulmonary effort is normal.   Musculoskeletal:         General: Normal range of motion.      Cervical back: Normal range of motion and neck supple.   Skin:     General: Skin is warm and dry.   Neurological:      Mental Status: She is alert and oriented to person, place, and time.   Psychiatric:         Mood and Affect: Mood normal.          Behavior: Behavior normal.       Significant Labs: All pertinent labs within the past 24 hours have been reviewed.    Significant Imaging: I have reviewed all pertinent imaging results/findings within the past 24 hours.

## 2022-07-26 LAB
ANION GAP SERPL CALC-SCNC: 9 MMOL/L (ref 8–16)
BASOPHILS # BLD AUTO: 0 K/UL (ref 0–0.2)
BASOPHILS NFR BLD: 0 % (ref 0–1.9)
BUN SERPL-MCNC: 12 MG/DL (ref 8–23)
CALCIUM SERPL-MCNC: 8.2 MG/DL (ref 8.7–10.5)
CHLORIDE SERPL-SCNC: 109 MMOL/L (ref 95–110)
CO2 SERPL-SCNC: 22 MMOL/L (ref 23–29)
CREAT SERPL-MCNC: 0.7 MG/DL (ref 0.5–1.4)
DIFFERENTIAL METHOD: ABNORMAL
EOSINOPHIL # BLD AUTO: 0.1 K/UL (ref 0–0.5)
EOSINOPHIL NFR BLD: 2 % (ref 0–8)
ERYTHROCYTE [DISTWIDTH] IN BLOOD BY AUTOMATED COUNT: 17.9 % (ref 11.5–14.5)
EST. GFR  (AFRICAN AMERICAN): >60 ML/MIN/1.73 M^2
EST. GFR  (NON AFRICAN AMERICAN): >60 ML/MIN/1.73 M^2
GLUCOSE SERPL-MCNC: 143 MG/DL (ref 70–110)
HCT VFR BLD AUTO: 27.9 % (ref 37–48.5)
HGB BLD-MCNC: 9.2 G/DL (ref 12–16)
IMM GRANULOCYTES # BLD AUTO: 0.1 K/UL (ref 0–0.04)
IMM GRANULOCYTES NFR BLD AUTO: 2 % (ref 0–0.5)
LYMPHOCYTES # BLD AUTO: 0.6 K/UL (ref 1–4.8)
LYMPHOCYTES NFR BLD: 11.4 % (ref 18–48)
MCH RBC QN AUTO: 31.4 PG (ref 27–31)
MCHC RBC AUTO-ENTMCNC: 33 G/DL (ref 32–36)
MCV RBC AUTO: 95 FL (ref 82–98)
MONOCYTES # BLD AUTO: 0.4 K/UL (ref 0.3–1)
MONOCYTES NFR BLD: 7.5 % (ref 4–15)
NEUTROPHILS # BLD AUTO: 3.9 K/UL (ref 1.8–7.7)
NEUTROPHILS NFR BLD: 77.1 % (ref 38–73)
NRBC BLD-RTO: 0 /100 WBC
PLATELET # BLD AUTO: 169 K/UL (ref 150–450)
PMV BLD AUTO: 10.8 FL (ref 9.2–12.9)
POCT GLUCOSE: 125 MG/DL (ref 70–110)
POCT GLUCOSE: 155 MG/DL (ref 70–110)
POCT GLUCOSE: 155 MG/DL (ref 70–110)
POCT GLUCOSE: 182 MG/DL (ref 70–110)
POCT GLUCOSE: 186 MG/DL (ref 70–110)
POTASSIUM SERPL-SCNC: 4.2 MMOL/L (ref 3.5–5.1)
RBC # BLD AUTO: 2.93 M/UL (ref 4–5.4)
SODIUM SERPL-SCNC: 140 MMOL/L (ref 136–145)
WBC # BLD AUTO: 5.08 K/UL (ref 3.9–12.7)

## 2022-07-26 PROCEDURE — 27000207 HC ISOLATION

## 2022-07-26 PROCEDURE — 21400001 HC TELEMETRY ROOM

## 2022-07-26 PROCEDURE — 25000003 PHARM REV CODE 250: Performed by: ORTHOPAEDIC SURGERY

## 2022-07-26 PROCEDURE — 25000003 PHARM REV CODE 250: Performed by: FAMILY MEDICINE

## 2022-07-26 PROCEDURE — 97530 THERAPEUTIC ACTIVITIES: CPT | Mod: CQ

## 2022-07-26 PROCEDURE — 94640 AIRWAY INHALATION TREATMENT: CPT

## 2022-07-26 PROCEDURE — 36415 COLL VENOUS BLD VENIPUNCTURE: CPT | Performed by: INTERNAL MEDICINE

## 2022-07-26 PROCEDURE — 94761 N-INVAS EAR/PLS OXIMETRY MLT: CPT

## 2022-07-26 PROCEDURE — 80048 BASIC METABOLIC PNL TOTAL CA: CPT | Performed by: INTERNAL MEDICINE

## 2022-07-26 PROCEDURE — 97530 THERAPEUTIC ACTIVITIES: CPT

## 2022-07-26 PROCEDURE — 85025 COMPLETE CBC W/AUTO DIFF WBC: CPT | Performed by: NURSE PRACTITIONER

## 2022-07-26 PROCEDURE — 36415 COLL VENOUS BLD VENIPUNCTURE: CPT | Performed by: NURSE PRACTITIONER

## 2022-07-26 PROCEDURE — 25000003 PHARM REV CODE 250: Performed by: NURSE PRACTITIONER

## 2022-07-26 RX ADMIN — MEMANTINE 10 MG: 10 TABLET ORAL at 09:07

## 2022-07-26 RX ADMIN — Medication 1000 UNITS: at 08:07

## 2022-07-26 RX ADMIN — PRAVASTATIN SODIUM 40 MG: 20 TABLET ORAL at 08:07

## 2022-07-26 RX ADMIN — ALBUTEROL SULFATE 2 PUFF: 90 AEROSOL, METERED RESPIRATORY (INHALATION) at 01:07

## 2022-07-26 RX ADMIN — APIXABAN 2.5 MG: 2.5 TABLET, FILM COATED ORAL at 09:07

## 2022-07-26 RX ADMIN — OXYCODONE HYDROCHLORIDE AND ACETAMINOPHEN 500 MG: 500 TABLET ORAL at 09:07

## 2022-07-26 RX ADMIN — QUETIAPINE FUMARATE 25 MG: 25 TABLET ORAL at 09:07

## 2022-07-26 RX ADMIN — THERA TABS 1 TABLET: TAB at 08:07

## 2022-07-26 RX ADMIN — TRAMADOL HYDROCHLORIDE 100 MG: 50 TABLET, COATED ORAL at 09:07

## 2022-07-26 RX ADMIN — POLYETHYLENE GLYCOL 3350 17 G: 17 POWDER, FOR SOLUTION ORAL at 08:07

## 2022-07-26 RX ADMIN — OXYCODONE HYDROCHLORIDE 5 MG: 5 TABLET ORAL at 11:07

## 2022-07-26 RX ADMIN — DOCUSATE SODIUM 100 MG: 100 CAPSULE, LIQUID FILLED ORAL at 09:07

## 2022-07-26 RX ADMIN — ALBUTEROL SULFATE 2 PUFF: 90 AEROSOL, METERED RESPIRATORY (INHALATION) at 08:07

## 2022-07-26 RX ADMIN — ALBUTEROL SULFATE 2 PUFF: 90 AEROSOL, METERED RESPIRATORY (INHALATION) at 07:07

## 2022-07-26 RX ADMIN — METOPROLOL SUCCINATE 25 MG: 25 TABLET, FILM COATED, EXTENDED RELEASE ORAL at 08:07

## 2022-07-26 RX ADMIN — RALOXIFENE 60 MG: 60 TABLET ORAL at 08:07

## 2022-07-26 RX ADMIN — OXYCODONE HYDROCHLORIDE AND ACETAMINOPHEN 500 MG: 500 TABLET ORAL at 08:07

## 2022-07-26 RX ADMIN — ACETAMINOPHEN 1000 MG: 500 TABLET ORAL at 06:07

## 2022-07-26 RX ADMIN — DOCUSATE SODIUM 100 MG: 100 CAPSULE, LIQUID FILLED ORAL at 08:07

## 2022-07-26 RX ADMIN — OXYCODONE HYDROCHLORIDE 5 MG: 5 TABLET ORAL at 06:07

## 2022-07-26 RX ADMIN — APIXABAN 2.5 MG: 2.5 TABLET, FILM COATED ORAL at 08:07

## 2022-07-26 RX ADMIN — SODIUM CHLORIDE: 0.9 INJECTION, SOLUTION INTRAVENOUS at 06:07

## 2022-07-26 NOTE — PT/OT/SLP PROGRESS
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  Patient agreed to PT session. Patient unable to recall weight bearing precautions.     Pain/Comfort  Pain Rating 1: 10/10 (WITH MOVEMENT)  Location - Side 1: Left  Location 1: leg  Pain Addressed 1: Other (see comments) (ACTIVITY PACING)    Objective:   Patient found with: peripheral IV, telemetry, bed alarm    Functional Mobility:  Increased time for room set up.    Supine > sit EOB: Max A (consistent VC for sequencing)    Forward scoot towards EOB: Total A    Reviewed weight bearing precautions (LLE TTWB) with patient and re enforced importance of compliance    STS Max A of 2    Stand pivot T/F to from EOB > chair w/ RW: Total A of 2 (patient actively resisting due to increased fear; increased time to complete)    Educated patient on importance of increased time OOB and up in chair. Patient given a minimum goal of 2 consecutive hours. Encouraged patient to perform AROM TE to BLE throughout the day within all available planes of motion. Re enforced importance of utilizing call light to meet needs in room and not attempt to get up without staff assistance. Patient verbalized understanding and agreed to comply.     AM-PAC 6 CLICK MOBILITY  How much help from another person does this patient currently need?   1 = Unable, Total/Dependent Assistance  2 = A lot, Maximum/Moderate Assistance  3 = A little,  Minimum/Contact Guard/Supervision  4 = None, Modified Surrency/Independent    Turning over in bed (including adjusting bedclothes, sheets and blankets)?: 2  Sitting down on and standing up from a chair with arms (e.g., wheelchair, bedside commode, etc.): 2  Moving from lying on back to sitting on the side of the bed?: 2  Moving to and from a bed to a chair (including a wheelchair)?: 2  Need to walk in hospital room?: 1  Climbing 3-5 steps with a railing?: 1  Basic Mobility Total Score: 10    AM-PAC Raw Score CMS G-Code Modifier Level of Impairment Assistance   6 % Total / Unable   7 - 9 CM 80 - 100% Maximal Assist   10 - 14 CL 60 - 80% Moderate Assist   15 - 19 CK 40 - 60% Moderate Assist   20 - 22 CJ 20 - 40% Minimal Assist   23 CI 1-20% SBA / CGA   24 CH 0% Independent/ Mod I     Patient left up in chair with all lines intact, call button in reach, chair alarm on and AVASYS present.    Assessment:  Rosy Balderrama is a 89 y.o. female with a medical diagnosis of Periprosthetic fracture of femur following total replacement of hip and presents with overall decline in functional mobility. Patient would continue to benefit from skilled PT to address functional limitations listed below in order to return to PLOF/decrease caregiver burden.     Rehab identified problem list/impairments: Rehab identified problem list/impairments: weakness, impaired endurance, impaired self care skills, impaired functional mobility, gait instability, impaired balance, impaired cognition, decreased coordination, decreased lower extremity function, decreased safety awareness, pain, decreased ROM, impaired skin, edema, impaired cardiopulmonary response to activity, orthopedic precautions    Rehab potential is fair.    Activity tolerance: Poor    Discharge recommendations: Discharge Facility/Level of Care Needs: nursing facility, skilled     Barriers to discharge:      Equipment recommendations: Equipment Needed After Discharge: none      GOALS:   Multidisciplinary Problems     Physical Therapy Goals        Problem: Physical Therapy    Goal Priority Disciplines Outcome Goal Variances Interventions   Physical Therapy Goal     PT, PT/OT Ongoing, Not Progressing     Description: LT/3/22  1. PT WILL COMPLETE BED MOBILITY WITH MOD A  2. PT WILL STAND TTWB WITH RW AND MAX A  3. PT WILL COMPLETE B LE TE X 10 REPS FOR INC ROM AND STRENGTHENING  4. PT WILL COMPLETE STAND PIVOT T/F TO CHAIR WITH TTWB AND MAX A                   PLAN:    Patient to be seen 3 x/week  to address the above listed problems via therapeutic activities, therapeutic exercises, gait training  Plan of Care expires: 22  Plan of Care reviewed with: patient         2022

## 2022-07-26 NOTE — PLAN OF CARE
Patient tolerated intervention fair. Sup>sit max A, sit>stand max A of 2, stand>pivot to bedside chair with RW and total A of 2. Recommending SNF at d/c.

## 2022-07-26 NOTE — ASSESSMENT & PLAN NOTE
7/22:  POD 6  Pt/ot on case  SNF placement needed   Case management on case  eliquis started for VTE prophylaxis  Pt complains of pain  Tramadol PRN for pain.    Plan per ortho:  89-year-old female postop day 7 status postoperative fixation of left periprosthetic femur fracture     Plan:  TTWB LLE  PT/OT for ambulation/gait training  Patient will need an ortho trauma clinic follow-up 4 weeks postop

## 2022-07-26 NOTE — PLAN OF CARE
AAOx3, DO to situation only at current time.  VSS  NAD    Pt has infrequent, dry, non-productive cough. States 10/10 pain to Left hip rt ORIF. Does not like to turn/change positions rt pain. Educated pt on the importance of weight shifting and compliance with therapy to improve pain and stiffness. Pt verbalized  Understanding.      Bed low & locked position, call light in reach, family at BS. Instructed to call for any needs or assistance. Verbalized understanding.

## 2022-07-26 NOTE — PROGRESS NOTES
[facility]  Telemedicine Progress Note    Patient Name: Rosy Balderrama  MRN: [id number]  Patient Class: IP- Inpatient   Admission Date: 7/19/2022  Length of Stay: 7 days  Attending Physician: Micheal Licona MD  Primary Care Provider: Maude Barrios MD          Subjective:     Principal Problem:Periprosthetic fracture of femur following total replacement of hip        HPI:  Patient is a 89-year-old  female with past medical history of dementia, AFib, diabetes, hyperlipidemia who presents to the ED today after suffering a fall this morning.  History obtained via son due to patient's history of dementia.  When questioned on what happened, patient stated I am not sure ask my children.  Patient suffered a fall after going to the bathroom this a.m..  She was then taken to the ED.    In the ED, x-rays reveal spiral fracture left proximal femur with significant displacement.  She was given 1 dose of Rocephin for UTI.  Ortho notified. Hospital Medicine consulted and patient admitted.       Overview/Hospital Course:  7/20: POD 1, status post operative fixation of left periprosthetic femur fracture. Eliquis started for VTE prophylaxis. Will need snf placement.   7/21: POD 2, status post operative fixation of left periprosthetic femur fracture. Cont eliquis. Hgb 6.9 this am. Not suspecting active bleed. Likely attributed to expected blood loss from surgery. Due to critical blood shortage, will not transfuse at this time as it is not emergent. Vital signs stable. Will repeat h/h this afternoon, may consider transfusing at that time if h/h continues to trend down.   7/22: POD 3, 1 unit prbc today. Hgb 6.3. SNF placement pending.     7/23: Pending SNF placement, ordered repeat CBC, vitals stable.   7/25: Postop day 6 status postoperative fixation of left periprosthetic femur fracture---awaiting placement  7/26: Postop day 7, patient still reports 10/10 pain but does not  appear in any distress when seen this morning, comfortably eating breakfast. Continue to work with PT/OT. Awaiting SNF placement.      Interval History: As above.    Review of Systems   Constitutional:  Positive for activity change and fatigue. Negative for chills, diaphoresis and fever.   Eyes:  Negative for visual disturbance.   Respiratory:  Negative for cough, chest tightness, shortness of breath and wheezing.    Cardiovascular:  Negative for chest pain, palpitations and leg swelling.   Gastrointestinal:  Negative for abdominal pain, constipation, diarrhea, nausea and vomiting.   Genitourinary:  Negative for dysuria, flank pain, frequency and hematuria.   Musculoskeletal:  Positive for arthralgias, back pain, gait problem and myalgias.   Skin:  Negative for rash and wound.   Neurological:  Positive for weakness. Negative for dizziness, seizures, light-headedness and headaches.   Objective:     Vital Signs (Most Recent):  Temp: 98.7 °F (37.1 °C) (07/26/22 1511)  Pulse: 79 (07/26/22 1511)  Resp: 17 (07/26/22 1511)  BP: 135/63 (07/26/22 1511)  SpO2: 98 % (07/26/22 1511) Vital Signs (24h Range):  Temp:  [97.6 °F (36.4 °C)-98.8 °F (37.1 °C)] 98.7 °F (37.1 °C)  Pulse:  [68-88] 79  Resp:  [16-20] 17  SpO2:  [94 %-98 %] 98 %  BP: (124-143)/(60-73) 135/63     Weight: 69 kg (152 lb 1.9 oz)  Body mass index is 29.71 kg/m².    Intake/Output Summary (Last 24 hours) at 7/26/2022 1623  Last data filed at 7/26/2022 1300  Gross per 24 hour   Intake 1276.6 ml   Output 850 ml   Net 426.6 ml      Physical Exam  Vitals and nursing note reviewed.   Constitutional:       General: She is not in acute distress.     Appearance: She is well-developed and overweight. She is not diaphoretic.   HENT:      Head: Normocephalic and atraumatic.      Mouth/Throat:      Pharynx: No oropharyngeal exudate.   Eyes:      General:         Right eye: No discharge.         Left eye: No discharge.      Conjunctiva/sclera: Conjunctivae normal.   Neck:       Thyroid: No thyromegaly.      Vascular: No JVD.   Cardiovascular:      Rate and Rhythm: Normal rate and regular rhythm.      Heart sounds: Normal heart sounds. No murmur heard.    No friction rub.   Pulmonary:      Effort: Pulmonary effort is normal. No respiratory distress.      Breath sounds: Normal breath sounds. No wheezing, rhonchi or rales.   Chest:      Chest wall: No tenderness.   Abdominal:      General: Bowel sounds are normal. There is no distension.      Palpations: Abdomen is soft. Abdomen is not rigid.      Tenderness: There is no abdominal tenderness. There is no guarding.   Musculoskeletal:      Cervical back: Normal range of motion and neck supple.   Skin:     General: Skin is warm and dry.   Neurological:      Mental Status: She is alert and oriented to person, place, and time.       Significant Labs: All pertinent labs within the past 24 hours have been reviewed.    Significant Imaging: I have reviewed all pertinent imaging results/findings within the past 24 hours.      Assessment/Plan:      * Periprosthetic fracture of femur following total replacement of hip  7/22:  POD 6  Pt/ot on case  SNF placement needed   Case management on case  eliquis started for VTE prophylaxis  Pt complains of pain  Tramadol PRN for pain.    Plan per ortho:  89-year-old female postop day 7 status postoperative fixation of left periprosthetic femur fracture     Plan:  TTWB LLE  PT/OT for ambulation/gait training  Patient will need an ortho trauma clinic follow-up 4 weeks postop      COVID-19  Incidentally found post surgery--tested for COVID for snf placement  Adequately anticoagulated   On RA    - COVID-19 testing   - Infection Control notified     - Isolation:   - Airborne, Contact and Droplet Precautions  - Cohort patients into COVID units  - N95 mask, wear eye protection  - 20 second hand hygiene              - Limit visitors per hospital policy              - Consolidating lab draws, nursing care, provider visits,  and interventions    - Diagnostics: (leukopenia, hyponatremia, hyperferritinemia, elevated troponin, elevated d-dimer, age, and comorbidities are significant predictors of poor clinical outcome)  CBC, CMP, Ferritin, CRP and Portable CXR    - Management:  Supplemental O2 to maintain SpO2 >92%  Continuous/intermittent Pulse Ox  Albuterol treatment PRN              Anemia  Transfused 1U PRBC on 7/22    --Daily CBC  --Transfuse with 1 unit PRBC for Hgb <7.0 or <8.0 if symptomatic     Closed left femoral fracture  Displaced proximal femoral fracture  Oxycodone for pain control  Ortho consult on case            Osteoporosis  Resume home meds      Paroxysmal atrial fibrillation  Continue home metoprolol  Patient not on anticoagulation  No history of bleeding reported  Demar Vasc score 4  Will likely need short term OAC VTE prophylaxis from Orthopedic surgery  Will defer long term anticoagulation to PCP      Hyperlipidemia  Resume statin    Diabetes mellitus type 2, controlled, without complications  Diabetic diet  Sliding scale  Hold home diabetic meds        VTE Risk Mitigation (From admission, onward)         Ordered     apixaban tablet 2.5 mg  2 times daily         07/20/22 0908     Place KIA hose  Until discontinued         07/19/22 2132     Place sequential compression device  Until discontinued         07/19/22 2132     IP VTE HIGH RISK PATIENT  Once         07/19/22 2132                      I have assessed these finding virtually using telemed platform and with assistance of bedside nurse                 The attending portion of this evaluation, treatment, and documentation was performed per Micheal Licona MD via Telemedicine AudioVisual using the secure LeTV software platform with 2 way audio/video. The provider was located off-site and the patient is located in the hospital. The aforementioned video software was utilized to document the relevant history and physical exam    Micheal Licona MD  Department of  [facility] Medicine   [facility] Tele[facility] ([facility])

## 2022-07-26 NOTE — PLAN OF CARE
[facility]    [facility]  [facility]  [facility]  [facility]       07/26/22 0906   Post-Acute Status   Post-Acute Authorization Placement   Post-Acute Placement Status Referrals Sent   Discharge Plan   Discharge Plan A Skilled Nursing Facility

## 2022-07-26 NOTE — PROGRESS NOTES
[facility])  Orthopedics  Progress Note    Patient Name: Rosy Balderrama  MRN: [id number]  Admission Date: 7/19/2022  Hospital Length of Stay: 7 days  Attending Provider: Micheal Licona MD  Primary Care Provider: Maude Barrios MD  Follow-up For: Procedure(s) (LRB):  ORIF, FRACTURE, FEMUR (Left)    Post-Operative Day: 7 Days Post-Op  Subjective:     Principal Problem:Periprosthetic fracture of femur following total replacement of hip    Principal Orthopedic Problem: Periprosthetic fracture of femur following total replacement of hip    Interval History: Rosy Balderrama is a 89-year-old female postop day 7 status post left operative fixation of periprosthetic femur fracture.  Patient is resting comfortably in bed.  She once again reports that she has not ambulated.  She says that she feels like she can not move the leg.  Of note, the patient is COVID-19 positive    Review of patient's allergies indicates:   Allergen Reactions    Alendronate Other (See Comments)    Atorvastatin Other (See Comments)    Ciprofloxacin Other (See Comments)    Codeine Itching    Diazepam Itching    Ibuprofen Other (See Comments)    Oxycodone-acetaminophen Other (See Comments) and Itching    Rofecoxib Other (See Comments)    Seldane     Simvastatin Other (See Comments)       Current Facility-Administered Medications   Medication    0.9%  NaCl infusion (for blood administration)    0.9%  NaCl infusion    acetaminophen tablet 1,000 mg    albuterol inhaler 2 puff    apixaban tablet 2.5 mg    ascorbic acid (vitamin C) tablet 500 mg    dextrose 10% bolus 125 mL    dextrose 10% bolus 250 mL    docusate sodium capsule 100 mg    glucagon (human recombinant) injection 1 mg    insulin aspart U-100 pen 0-5 Units    memantine tablet 10 mg    metoprolol succinate (TOPROL-XL) 24 hr tablet 25 mg    multivitamin tablet    ondansetron disintegrating tablet 4 mg    oxyCODONE immediate release tablet 5 mg     polyethylene glycol packet 17 g    pravastatin tablet 40 mg    QUEtiapine tablet 25 mg    raloxifene tablet 60 mg    sodium chloride 0.9% flush 10 mL    sodium chloride 0.9% flush 10 mL    traMADoL tablet 100 mg    vitamin D 1000 units tablet 1,000 Units     Objective:     Vital Signs (Most Recent):  Temp: 97.6 °F (36.4 °C) (07/26/22 0846)  Pulse: 74 (07/26/22 0846)  Resp: 17 (07/26/22 0846)  BP: 137/63 (07/26/22 0846)  SpO2: (!) 94 % (07/26/22 0846) Vital Signs (24h Range):  Temp:  [97.6 °F (36.4 °C)-98.7 °F (37.1 °C)] 97.6 °F (36.4 °C)  Pulse:  [71-88] 74  Resp:  [17-20] 17  SpO2:  [94 %-98 %] 94 %  BP: (124-143)/(59-73) 137/63     Weight: 69 kg (152 lb 1.9 oz)  Height: 5' (152.4 cm)  Body mass index is 29.71 kg/m².      Intake/Output Summary (Last 24 hours) at 7/26/2022 1000  Last data filed at 7/26/2022 0617  Gross per 24 hour   Intake 1036.6 ml   Output 1250 ml   Net -213.4 ml       Ortho/SPM Exam   Left lower extremity:  Aquacel dressing is clean, dry, and intact  Mild edema  Mild TTP  No pain with logroll  Motor exam normal  Calf and compartments are soft and compressible  Sensation and pulses intact     GEN: Well developed, well nourished female. AAOX3. No acute distress.   Normocephalic, atraumatic.   MARY  Breathing unlabored.  Mood and affect appropriate.      Significant Labs: All pertinent labs within the past 24 hours have been reviewed.    Significant Imaging: I have reviewed and interpreted all pertinent imaging results/findings.    Assessment/Plan:     Active Diagnoses:    Diagnosis Date Noted POA    PRINCIPAL PROBLEM:  Periprosthetic fracture of femur following total replacement of hip [M97.8XXA, Z96.649] 07/19/2022 Not Applicable    COVID-19 [U07.1] 07/25/2022 No    Anemia [D64.9] 07/21/2022 Yes    Paroxysmal atrial fibrillation [I48.0] 07/19/2022 Yes    Osteoporosis [M81.0] 07/19/2022 Yes    Closed left femoral fracture [S72.92XA] 07/19/2022 Yes    Hyperlipidemia [E78.5]  Yes     Diabetes mellitus type 2, controlled, without complications [E11.9]  Yes      Problems Resolved During this Admission:     Assessment:  A 89-year-old female postop day 7 status post operative fixation of left periprosthetic femur fracture    Plan:  TTWB LLE  PT/OT for ambulation/gait training  I encouraged the patient to work with the therapist and reassured her that she is indeed able to move the lower extremity  Patient will need an ortho trauma clinic follow-up 4 weeks postop  Will sign off on this patient, please feel free to consult us for any further questions or concerns    Nahum David PA-C  Orthopedics  [facility] - Telemetry ([facility])

## 2022-07-26 NOTE — CONSULTS
This consult was accepted  On 7/23 to be seen on 7/24. This patient is already on the VM service.      Leobardo Deras NP

## 2022-07-26 NOTE — SUBJECTIVE & OBJECTIVE
Interval History: As above.    Review of Systems   Constitutional:  Positive for activity change and fatigue. Negative for chills, diaphoresis and fever.   Eyes:  Negative for visual disturbance.   Respiratory:  Negative for cough, chest tightness, shortness of breath and wheezing.    Cardiovascular:  Negative for chest pain, palpitations and leg swelling.   Gastrointestinal:  Negative for abdominal pain, constipation, diarrhea, nausea and vomiting.   Genitourinary:  Negative for dysuria, flank pain, frequency and hematuria.   Musculoskeletal:  Positive for arthralgias, back pain, gait problem and myalgias.   Skin:  Negative for rash and wound.   Neurological:  Positive for weakness. Negative for dizziness, seizures, light-headedness and headaches.   Objective:     Vital Signs (Most Recent):  Temp: 98.7 °F (37.1 °C) (07/26/22 1511)  Pulse: 79 (07/26/22 1511)  Resp: 17 (07/26/22 1511)  BP: 135/63 (07/26/22 1511)  SpO2: 98 % (07/26/22 1511) Vital Signs (24h Range):  Temp:  [97.6 °F (36.4 °C)-98.8 °F (37.1 °C)] 98.7 °F (37.1 °C)  Pulse:  [68-88] 79  Resp:  [16-20] 17  SpO2:  [94 %-98 %] 98 %  BP: (124-143)/(60-73) 135/63     Weight: 69 kg (152 lb 1.9 oz)  Body mass index is 29.71 kg/m².    Intake/Output Summary (Last 24 hours) at 7/26/2022 1623  Last data filed at 7/26/2022 1300  Gross per 24 hour   Intake 1276.6 ml   Output 850 ml   Net 426.6 ml      Physical Exam  Vitals and nursing note reviewed.   Constitutional:       General: She is not in acute distress.     Appearance: She is well-developed and overweight. She is not diaphoretic.   HENT:      Head: Normocephalic and atraumatic.      Mouth/Throat:      Pharynx: No oropharyngeal exudate.   Eyes:      General:         Right eye: No discharge.         Left eye: No discharge.      Conjunctiva/sclera: Conjunctivae normal.   Neck:      Thyroid: No thyromegaly.      Vascular: No JVD.   Cardiovascular:      Rate and Rhythm: Normal rate and regular rhythm.      Heart  sounds: Normal heart sounds. No murmur heard.    No friction rub.   Pulmonary:      Effort: Pulmonary effort is normal. No respiratory distress.      Breath sounds: Normal breath sounds. No wheezing, rhonchi or rales.   Chest:      Chest wall: No tenderness.   Abdominal:      General: Bowel sounds are normal. There is no distension.      Palpations: Abdomen is soft. Abdomen is not rigid.      Tenderness: There is no abdominal tenderness. There is no guarding.   Musculoskeletal:      Cervical back: Normal range of motion and neck supple.   Skin:     General: Skin is warm and dry.   Neurological:      Mental Status: She is alert and oriented to person, place, and time.       Significant Labs: All pertinent labs within the past 24 hours have been reviewed.    Significant Imaging: I have reviewed all pertinent imaging results/findings within the past 24 hours.

## 2022-07-26 NOTE — PLAN OF CARE
Spoke with son.  Advised [facility] and [facility] do not accept covid patient.  Discussed facilities that do accept but son is not willing to have patient placed far from his home.  He was told his mother was not medically stable to discharge.  Secure chat to tele physician to please call the son today.

## 2022-07-26 NOTE — PT/OT/SLP PROGRESS
Occupational Therapy   Treatment    Name: Rosy Balderrama  MRN: [id number]  Admitting Diagnosis:  Periprosthetic fracture of femur following total replacement of hip  7 Days Post-Op    Recommendations:     Discharge Recommendations: nursing facility, skilled  Discharge Equipment Recommendations:  none  Barriers to discharge:  None    Assessment:     Rosy Balderrama is a 89 y.o. female with a medical diagnosis of Periprosthetic fracture of femur following total replacement of hip.  She presents with the following performance deficits affecting function are weakness, impaired endurance, impaired self care skills, impaired functional mobility, impaired balance, pain, decreased safety awareness, orthopedic precautions, edema.     Rehab Prognosis:  Fair; patient would benefit from acute skilled OT services to address these deficits and reach maximum level of function.       Plan:     Patient to be seen 2 x/week to address the above listed problems via self-care/home management, therapeutic activities, therapeutic exercises  · Plan of Care Expires: 08/03/22  · Plan of Care Reviewed with: patient    Subjective     Pain/Comfort:  · Pain Rating 1: 10/10 (with movement, resolves with rest)  · Location - Side 1: Left  · Location 1: leg  · Pain Addressed 1:  (activity pacing)  · Pain Rating Post-Intervention 1: 0/10 (at rest)    Objective:     Communicated with: NurseHeidi, prior to session.  Patient found supine with peripheral IV, bed alarm, telemetry (taz sys) upon OT entry to room.    General Precautions: Standard, fall, airborne, contact, droplet   Orthopedic Precautions:LLE toe touch weight bearing   Braces: N/A  Respiratory Status: Room air    Bed Mobility:    · Patient completed Supine to Sit with maximal assistance and with side rail     Functional Mobility/Transfers:  · Patient completed Sit <> Stand Transfer with maximal assistance and of 2 persons  with  rolling walker   · Patient completed Bed <> Chair Transfer using  Stand Pivot technique with total assistance and of 2 persons with rolling walker   · Active resistance with completion of transfer. Poor ability to follow commands with completion. Noted fear of falling throughout.  · Poor ability to comply with WB precaution despite cueing.    Activities of Daily Living:  · Feeding:  setup breakfast tray from bedside chair with silverware in R hand    [facility] 6 Click ADL: 14    Treatment & Education:  Patient tolerated intervention fair. Max encouragement provided throughout. Increased time with all transfers with rest breaks between. Patient provided with max cueing for active engagement in transfers to increase independence and decrease fall risk. Encouraged completion of B UE AROM therex while seated upright in chair to increase functional strength and activity tolerance. Patient educated to call for A to transfer back to bed. Stated understanding and in agreement with POC.    Patient left up in chair with all lines intact, call button in reach and chair alarm onEducation:      GOALS:   Multidisciplinary Problems     Occupational Therapy Goals        Problem: Occupational Therapy    Goal Priority Disciplines Outcome Interventions   Occupational Therapy Goal     OT, PT/OT Ongoing, Progressing    Description: O.T. GOALS TO BE MET BY 8-3-22  PT WILL TOLERATE 1 SET X 15 REPS B UE ROM EXERCISE  MIN A WITH UE DRESSING  MIN A WITH BSC TRANSFERS                   Time Tracking:     OT Date of Treatment: 07/26/22  OT Start Time: 0915  OT Stop Time: 0940  OT Total Time (min): 25 min    Billable Minutes:Therapeutic Activity 25 7/26/2022

## 2022-07-27 VITALS
WEIGHT: 166.88 LBS | RESPIRATION RATE: 18 BRPM | HEIGHT: 60 IN | DIASTOLIC BLOOD PRESSURE: 72 MMHG | BODY MASS INDEX: 32.76 KG/M2 | HEART RATE: 70 BPM | SYSTOLIC BLOOD PRESSURE: 150 MMHG | TEMPERATURE: 99 F | OXYGEN SATURATION: 94 %

## 2022-07-27 DIAGNOSIS — U07.1 COVID-19 VIRUS DETECTED: ICD-10-CM

## 2022-07-27 LAB
BASOPHILS # BLD AUTO: 0 K/UL (ref 0–0.2)
BASOPHILS NFR BLD: 0 % (ref 0–1.9)
D DIMER PPP IA.FEU-MCNC: 2.15 MG/L FEU
DIFFERENTIAL METHOD: ABNORMAL
EOSINOPHIL # BLD AUTO: 0 K/UL (ref 0–0.5)
EOSINOPHIL NFR BLD: 0.7 % (ref 0–8)
ERYTHROCYTE [DISTWIDTH] IN BLOOD BY AUTOMATED COUNT: 17.5 % (ref 11.5–14.5)
FERRITIN SERPL-MCNC: 606 NG/ML (ref 20–300)
HCT VFR BLD AUTO: 26.1 % (ref 37–48.5)
HGB BLD-MCNC: 8.4 G/DL (ref 12–16)
IMM GRANULOCYTES # BLD AUTO: 0.08 K/UL (ref 0–0.04)
IMM GRANULOCYTES NFR BLD AUTO: 1.4 % (ref 0–0.5)
LDH SERPL L TO P-CCNC: 309 U/L (ref 110–260)
LYMPHOCYTES # BLD AUTO: 0.8 K/UL (ref 1–4.8)
LYMPHOCYTES NFR BLD: 13.8 % (ref 18–48)
MCH RBC QN AUTO: 30.7 PG (ref 27–31)
MCHC RBC AUTO-ENTMCNC: 32.2 G/DL (ref 32–36)
MCV RBC AUTO: 95 FL (ref 82–98)
MONOCYTES # BLD AUTO: 0.4 K/UL (ref 0.3–1)
MONOCYTES NFR BLD: 7 % (ref 4–15)
NEUTROPHILS # BLD AUTO: 4.3 K/UL (ref 1.8–7.7)
NEUTROPHILS NFR BLD: 77.1 % (ref 38–73)
NRBC BLD-RTO: 0 /100 WBC
PLATELET # BLD AUTO: 175 K/UL (ref 150–450)
PMV BLD AUTO: 10.7 FL (ref 9.2–12.9)
POCT GLUCOSE: 155 MG/DL (ref 70–110)
POCT GLUCOSE: 171 MG/DL (ref 70–110)
POCT GLUCOSE: 186 MG/DL (ref 70–110)
RBC # BLD AUTO: 2.74 M/UL (ref 4–5.4)
WBC # BLD AUTO: 5.58 K/UL (ref 3.9–12.7)

## 2022-07-27 PROCEDURE — 25000003 PHARM REV CODE 250: Performed by: NURSE PRACTITIONER

## 2022-07-27 PROCEDURE — 85379 FIBRIN DEGRADATION QUANT: CPT | Performed by: NURSE PRACTITIONER

## 2022-07-27 PROCEDURE — 85025 COMPLETE CBC W/AUTO DIFF WBC: CPT | Performed by: NURSE PRACTITIONER

## 2022-07-27 PROCEDURE — 97530 THERAPEUTIC ACTIVITIES: CPT | Mod: CQ

## 2022-07-27 PROCEDURE — 82728 ASSAY OF FERRITIN: CPT | Performed by: NURSE PRACTITIONER

## 2022-07-27 PROCEDURE — 25000003 PHARM REV CODE 250: Performed by: ORTHOPAEDIC SURGERY

## 2022-07-27 PROCEDURE — 83615 LACTATE (LD) (LDH) ENZYME: CPT | Performed by: NURSE PRACTITIONER

## 2022-07-27 PROCEDURE — 36415 COLL VENOUS BLD VENIPUNCTURE: CPT | Performed by: NURSE PRACTITIONER

## 2022-07-27 PROCEDURE — 94640 AIRWAY INHALATION TREATMENT: CPT

## 2022-07-27 PROCEDURE — 25000003 PHARM REV CODE 250: Performed by: FAMILY MEDICINE

## 2022-07-27 RX ORDER — ACETAMINOPHEN 500 MG
500 TABLET ORAL EVERY 6 HOURS PRN
Qty: 90 TABLET | Refills: 0 | Status: SHIPPED | OUTPATIENT
Start: 2022-07-27 | End: 2022-10-25

## 2022-07-27 RX ORDER — DOCUSATE SODIUM 100 MG/1
100 CAPSULE, LIQUID FILLED ORAL 2 TIMES DAILY
Qty: 60 CAPSULE | Refills: 2 | Status: SHIPPED | OUTPATIENT
Start: 2022-07-27 | End: 2022-10-25

## 2022-07-27 RX ADMIN — APIXABAN 2.5 MG: 2.5 TABLET, FILM COATED ORAL at 10:07

## 2022-07-27 RX ADMIN — ACETAMINOPHEN 1000 MG: 500 TABLET ORAL at 06:07

## 2022-07-27 RX ADMIN — ALBUTEROL SULFATE 2 PUFF: 90 AEROSOL, METERED RESPIRATORY (INHALATION) at 07:07

## 2022-07-27 RX ADMIN — RALOXIFENE 60 MG: 60 TABLET ORAL at 10:07

## 2022-07-27 RX ADMIN — POLYETHYLENE GLYCOL 3350 17 G: 17 POWDER, FOR SOLUTION ORAL at 10:07

## 2022-07-27 RX ADMIN — ALBUTEROL SULFATE 2 PUFF: 90 AEROSOL, METERED RESPIRATORY (INHALATION) at 01:07

## 2022-07-27 RX ADMIN — OXYCODONE HYDROCHLORIDE AND ACETAMINOPHEN 500 MG: 500 TABLET ORAL at 10:07

## 2022-07-27 RX ADMIN — THERA TABS 1 TABLET: TAB at 10:07

## 2022-07-27 RX ADMIN — MEMANTINE 10 MG: 10 TABLET ORAL at 10:07

## 2022-07-27 RX ADMIN — DOCUSATE SODIUM 100 MG: 100 CAPSULE, LIQUID FILLED ORAL at 10:07

## 2022-07-27 RX ADMIN — Medication 1000 UNITS: at 10:07

## 2022-07-27 RX ADMIN — METOPROLOL SUCCINATE 25 MG: 25 TABLET, FILM COATED, EXTENDED RELEASE ORAL at 10:07

## 2022-07-27 RX ADMIN — PRAVASTATIN SODIUM 40 MG: 20 TABLET ORAL at 10:07

## 2022-07-27 NOTE — PROGRESS NOTES
[facility]  Telemedicine Progress Note    Patient Name: Rosy Balderrama  MRN: [id number]  Patient Class: IP- Inpatient   Admission Date: 7/19/2022  Length of Stay: 8 days  Attending Physician: Micheal Licona MD  Primary Care Provider: Maude Barrios MD          Subjective:     Principal Problem:Periprosthetic fracture of femur following total replacement of hip        HPI:  Patient is a 89-year-old  female with past medical history of dementia, AFib, diabetes, hyperlipidemia who presents to the ED today after suffering a fall this morning.  History obtained via son due to patient's history of dementia.  When questioned on what happened, patient stated I am not sure ask my children.  Patient suffered a fall after going to the bathroom this a.m..  She was then taken to the ED.    In the ED, x-rays reveal spiral fracture left proximal femur with significant displacement.  She was given 1 dose of Rocephin for UTI.  Ortho notified. Hospital Medicine consulted and patient admitted.       Overview/Hospital Course:  7/20: POD 1, status post operative fixation of left periprosthetic femur fracture. Eliquis started for VTE prophylaxis. Will need snf placement.   7/21: POD 2, status post operative fixation of left periprosthetic femur fracture. Cont eliquis. Hgb 6.9 this am. Not suspecting active bleed. Likely attributed to expected blood loss from surgery. Due to critical blood shortage, will not transfuse at this time as it is not emergent. Vital signs stable. Will repeat h/h this afternoon, may consider transfusing at that time if h/h continues to trend down.   7/22: POD 3, 1 unit prbc today. Hgb 6.3. SNF placement pending.     7/23: Pending SNF placement, ordered repeat CBC, vitals stable.   7/25: Postop day 6 status postoperative fixation of left periprosthetic femur fracture---awaiting placement  7/26: Postop day 7, patient still reports 10/10 pain but does not  appear in any distress when seen this morning, comfortably eating breakfast. Continue to work with PT/OT. Awaiting SNF placement.  7/27: Postop day 8, patient resting comfortably and in no acute distress. Reports pain is well controlled this morning. Continue to work with PT/OT. Medically stable. Awaiting SNF placement.      Interval History: As above.    Review of Systems   Constitutional:  Positive for activity change and fatigue. Negative for chills, diaphoresis and fever.   Eyes:  Negative for visual disturbance.   Respiratory:  Negative for cough, chest tightness, shortness of breath and wheezing.    Cardiovascular:  Negative for chest pain, palpitations and leg swelling.   Gastrointestinal:  Negative for abdominal pain, constipation, diarrhea, nausea and vomiting.   Genitourinary:  Negative for dysuria, flank pain, frequency and hematuria.   Musculoskeletal:  Positive for arthralgias, back pain, gait problem and myalgias.   Skin:  Negative for rash and wound.   Neurological:  Positive for weakness. Negative for dizziness, seizures, light-headedness and headaches.   Objective:     Vital Signs (Most Recent):  Temp: 98.5 °F (36.9 °C) (07/27/22 0848)  Pulse: 74 (07/27/22 1025)  Resp: 17 (07/27/22 0803)  BP: (!) 146/71 (07/27/22 1025)  SpO2: (!) 92 % (07/27/22 0803) Vital Signs (24h Range):  Temp:  [98.5 °F (36.9 °C)-99.8 °F (37.7 °C)] 98.5 °F (36.9 °C)  Pulse:  [] 74  Resp:  [16-20] 17  SpO2:  [92 %-98 %] 92 %  BP: (135-170)/(62-79) 146/71     Weight: 75.7 kg (166 lb 14.2 oz)  Body mass index is 32.59 kg/m².    Intake/Output Summary (Last 24 hours) at 7/27/2022 1054  Last data filed at 7/26/2022 1800  Gross per 24 hour   Intake 480 ml   Output --   Net 480 ml        Physical Exam  Vitals and nursing note reviewed.   Constitutional:       General: She is not in acute distress.     Appearance: She is well-developed and overweight. She is not diaphoretic.   HENT:      Head: Normocephalic and atraumatic.       Mouth/Throat:      Pharynx: No oropharyngeal exudate.   Eyes:      General:         Right eye: No discharge.         Left eye: No discharge.      Conjunctiva/sclera: Conjunctivae normal.   Neck:      Thyroid: No thyromegaly.      Vascular: No JVD.   Cardiovascular:      Rate and Rhythm: Normal rate and regular rhythm.      Heart sounds: Normal heart sounds. No murmur heard.    No friction rub.   Pulmonary:      Effort: Pulmonary effort is normal. No respiratory distress.      Breath sounds: Normal breath sounds. No wheezing, rhonchi or rales.   Chest:      Chest wall: No tenderness.   Abdominal:      General: Bowel sounds are normal. There is no distension.      Palpations: Abdomen is soft. Abdomen is not rigid.      Tenderness: There is no abdominal tenderness. There is no guarding.   Musculoskeletal:      Cervical back: Normal range of motion and neck supple.   Skin:     General: Skin is warm and dry.   Neurological:      Mental Status: She is alert and oriented to person, place, and time.       Significant Labs: All pertinent labs within the past 24 hours have been reviewed.    Significant Imaging: I have reviewed all pertinent imaging results/findings within the past 24 hours.      Assessment/Plan:      * Periprosthetic fracture of femur following total replacement of hip  7/22:  POD 6  Pt/ot on case  SNF placement needed   Case management on case  eliquis started for VTE prophylaxis  Pt complains of pain  Tramadol PRN for pain.    Plan per ortho:  89-year-old female postop day 7 status postoperative fixation of left periprosthetic femur fracture     Plan:  TTWB LLE  PT/OT for ambulation/gait training  Patient will need an ortho trauma clinic follow-up 4 weeks postop      COVID-19  Incidentally found post surgery--tested for COVID for snf placement  Adequately anticoagulated   On RA    - COVID-19 testing   - Infection Control notified     - Isolation:   - Airborne, Contact and Droplet Precautions  - Cohort  patients into COVID units  - N95 mask, wear eye protection  - 20 second hand hygiene              - Limit visitors per hospital policy              - Consolidating lab draws, nursing care, provider visits, and interventions    - Diagnostics: (leukopenia, hyponatremia, hyperferritinemia, elevated troponin, elevated d-dimer, age, and comorbidities are significant predictors of poor clinical outcome)  CBC, CMP, Ferritin, CRP and Portable CXR    - Management:  Supplemental O2 to maintain SpO2 >92%  Continuous/intermittent Pulse Ox  Albuterol treatment PRN              Anemia  Transfused 1U PRBC on 7/22    --Daily CBC  --Transfuse with 1 unit PRBC for Hgb <7.0 or <8.0 if symptomatic     Closed left femoral fracture  Displaced proximal femoral fracture  Oxycodone for pain control  Ortho consult on case            Osteoporosis  Resume home meds      Paroxysmal atrial fibrillation  Continue home metoprolol  Patient not on anticoagulation  No history of bleeding reported  Demar Vasc score 4  Will likely need short term OAC VTE prophylaxis from Orthopedic surgery  Will defer long term anticoagulation to PCP      Hyperlipidemia  Resume statin    Diabetes mellitus type 2, controlled, without complications  Diabetic diet  Sliding scale  Hold home diabetic meds        VTE Risk Mitigation (From admission, onward)         Ordered     apixaban tablet 2.5 mg  2 times daily         07/20/22 0908     Place KIA hose  Until discontinued         07/19/22 2132     Place sequential compression device  Until discontinued         07/19/22 2132     IP VTE HIGH RISK PATIENT  Once         07/19/22 2132                      I have assessed these finding virtually using telemed platform and with assistance of bedside nurse                 The attending portion of this evaluation, treatment, and documentation was performed per Micheal Licona MD via Telemedicine AudioVisual using the secure [employer] software platform with 2 way audio/video. The  provider was located off-site and the patient is located in the hospital. The aforementioned video software was utilized to document the relevant history and physical exam    Micheal Licona MD  Department of Hospital Medicine   [facility] ([facility])

## 2022-07-27 NOTE — PLAN OF CARE
Patient accepted by [facility].  Spoke with son, Arben.  Agree on placement at [facility].  Advised Ghazala from [facility] will be calling for admission paperwork.    Notified Dr. Licona for discharge orders.    Patient is transferring to [facility] today.  Please call report to [phone].  The facility is setting up an Ambulance for transportation.           07/27/22 1151   Post-Acute Status   Post-Acute Authorization Placement   Post-Acute Placement Status Set-up Complete/Auth obtained   Discharge Plan   Discharge Plan A Skilled Nursing Facility

## 2022-07-27 NOTE — PROGRESS NOTES
[facility] - Telemetry ([facility])  Adult Nutrition  Progress Note    SUMMARY       Recommendations    Recommendation/Intervention:   1. Patient will continue on diabetic diet with no tolerance issues.    2. Recommend patient consume oral supplement daily to promote healing.    3. Collaboration with Medical Providers    Goals:   1.  Patient with consume adequate oral intake with diet and supplements to promote healing.    Nutrition Goal Status: new    Communication of RD Recs: other (comment) (Plan of care, Sticky notes)    Assessment and Plan      Nutrition Problem  Increased nutrient needs    Related to (etiology):   Medical condition: femur fx s/p surgery    Signs and Symptoms (as evidenced by):   Increased needs for healing    Interventions/Recommendations(treatment strategy):  1. Patient will continue on diabetic diet with no tolerance issues.    2. Recommend patient consume oral supplement daily to promote healing.    3. Collaboration with Medical Providers    Nutrition Diagnosis Status:   new       Malnutrition Assessment                                       Reason for Assessment    Reason For Assessment: length of stay    Diagnosis: surgery/postoperative complications, diabetes diagnosis/complications  Relevant Medical History: Patient with medical history of Diabetes, AFIB, and fx fof femur s/p hip replacement.  Patient is COVID+.    Interdisciplinary Rounds: did not attend    General Information Comments:   7/27/2022: Patient with a diabetic diet at this time.  PO intake fair with 50% po intake.  No tolerance issue noted. Last bowel movement noted on 7/27/2022.  Labs reviewed.  NKFA.  Will continue to monitor.  (KIM remote)    Nutrition Discharge Planning: Patient to continue to diabetic diet with no tolerance issues.    Nutrition Risk Screen    Nutrition Risk Screen: no indicators present    Nutrition/Diet History    Spiritual, Cultural Beliefs, Rastafari Practices, Values that Affect Care: no  Food Allergies:  NKFA  Factors Affecting Nutritional Intake: None identified at this time    Anthropometrics    Temp: 98.5 °F (36.9 °C)  Height: 5' (152.4 cm)  Height (inches): 60 in  Weight Method: Bed Scale  Weight: 75.7 kg (166 lb 14.2 oz)  Weight (lb): 166.89 lb  Ideal Body Weight (IBW), Female: 100 lb  % Ideal Body Weight, Female (lb): 166.89 %  BMI (Calculated): 32.6  BMI Grade: 35 - 39.9 - obesity - grade II       Lab/Procedures/Meds    Pertinent Labs Reviewed: reviewed  Pertinent Medications Reviewed: reviewed  BMP  Lab Results   Component Value Date     07/26/2022    K 4.2 07/26/2022     07/26/2022    CO2 22 (L) 07/26/2022    BUN 12 07/26/2022    CREATININE 0.7 07/26/2022    CALCIUM 8.2 (L) 07/26/2022    ANIONGAP 9 07/26/2022    ESTGFRAFRICA >60 07/26/2022    EGFRNONAA >60 07/26/2022     Lab Results   Component Value Date     07/26/2022    K 4.2 07/26/2022     07/26/2022    CO2 22 (L) 07/26/2022     Lab Results   Component Value Date    HGBA1C 5.3 07/19/2022     Lab Results   Component Value Date    LABPROT 11.0 07/25/2022    ALBUMIN 2.7 (L) 07/25/2022       Physical Findings/Assessment         Estimated/Assessed Needs    Weight Used For Calorie Calculations: 75.7 kg (166 lb 14.2 oz)  Energy Calorie Requirements (kcal): 25kcals =1892kcals     Protein Requirements: 1.2-1.5g/kg  Weight Used For Protein Calculations: 75.7 kg (166 lb 14.2 oz)  Fluid Requirements (mL): 1892  Estimated Fluid Requirement Method: RDA Method  RDA Method (mL): 25  CHO Requirement: 237      Nutrition Prescription Ordered    Current Diet Order: Diabetic  Oral Nutrition Supplement:  (none at this time.  RD will recommend to order oral supplement daily.)    Evaluation of Received Nutrient/Fluid Intake    % Kcal Needs: 50%  % Protein Needs: 50%  Energy Calories Required: meeting needs  Protein Required: meeting needs  Fluid Required: meeting needs  Tolerance: tolerating  % Intake of Estimated Energy Needs: 50 - 75 %  % Meal Intake:  50 - 75 %    Nutrition Risk    Level of Risk/Frequency of Follow-up: low     Monitor and Evaluation    Food and Nutrient Intake: energy intake, food and beverage intake  Food and Nutrient Adminstration: diet order  Knowledge/Beliefs/Attitudes: food and nutrition knowledge/skill, beliefs and attitudes  Physical Activity and Function: nutrition-related ADLs and IADLs, factors affecting access to physical activity  Anthropometric Measurements: height/length, weight, weight change, body mass index  Biochemical Data, Medical Tests and Procedures: electrolyte and renal panel, lipid profile, gastrointestinal profile, glucose/endocrine profile, inflammatory profile     Nutrition Follow-Up    RD Follow-up?: Yes  Nanette Justice, MS, RDN, LDN

## 2022-07-27 NOTE — PLAN OF CARE
Nutrition Plan of Care:    Nutrition Interventions/Recommendations(treatment strategy):  1. Patient will continue on diabetic diet with no tolerance issues.    2. Recommend patient consume oral supplement daily to promote healing.    3. Collaboration with Medical Providers    Nanette Justice MS, RDN, LDN

## 2022-07-27 NOTE — SUBJECTIVE & OBJECTIVE
Interval History: As above.    Review of Systems   Constitutional:  Positive for activity change and fatigue. Negative for chills, diaphoresis and fever.   Eyes:  Negative for visual disturbance.   Respiratory:  Negative for cough, chest tightness, shortness of breath and wheezing.    Cardiovascular:  Negative for chest pain, palpitations and leg swelling.   Gastrointestinal:  Negative for abdominal pain, constipation, diarrhea, nausea and vomiting.   Genitourinary:  Negative for dysuria, flank pain, frequency and hematuria.   Musculoskeletal:  Positive for arthralgias, back pain, gait problem and myalgias.   Skin:  Negative for rash and wound.   Neurological:  Positive for weakness. Negative for dizziness, seizures, light-headedness and headaches.   Objective:     Vital Signs (Most Recent):  Temp: 98.5 °F (36.9 °C) (07/27/22 0848)  Pulse: 74 (07/27/22 1025)  Resp: 17 (07/27/22 0803)  BP: (!) 146/71 (07/27/22 1025)  SpO2: (!) 92 % (07/27/22 0803) Vital Signs (24h Range):  Temp:  [98.5 °F (36.9 °C)-99.8 °F (37.7 °C)] 98.5 °F (36.9 °C)  Pulse:  [] 74  Resp:  [16-20] 17  SpO2:  [92 %-98 %] 92 %  BP: (135-170)/(62-79) 146/71     Weight: 75.7 kg (166 lb 14.2 oz)  Body mass index is 32.59 kg/m².    Intake/Output Summary (Last 24 hours) at 7/27/2022 1054  Last data filed at 7/26/2022 1800  Gross per 24 hour   Intake 480 ml   Output --   Net 480 ml        Physical Exam  Vitals and nursing note reviewed.   Constitutional:       General: She is not in acute distress.     Appearance: She is well-developed and overweight. She is not diaphoretic.   HENT:      Head: Normocephalic and atraumatic.      Mouth/Throat:      Pharynx: No oropharyngeal exudate.   Eyes:      General:         Right eye: No discharge.         Left eye: No discharge.      Conjunctiva/sclera: Conjunctivae normal.   Neck:      Thyroid: No thyromegaly.      Vascular: No JVD.   Cardiovascular:      Rate and Rhythm: Normal rate and regular rhythm.       Heart sounds: Normal heart sounds. No murmur heard.    No friction rub.   Pulmonary:      Effort: Pulmonary effort is normal. No respiratory distress.      Breath sounds: Normal breath sounds. No wheezing, rhonchi or rales.   Chest:      Chest wall: No tenderness.   Abdominal:      General: Bowel sounds are normal. There is no distension.      Palpations: Abdomen is soft. Abdomen is not rigid.      Tenderness: There is no abdominal tenderness. There is no guarding.   Musculoskeletal:      Cervical back: Normal range of motion and neck supple.   Skin:     General: Skin is warm and dry.   Neurological:      Mental Status: She is alert and oriented to person, place, and time.       Significant Labs: All pertinent labs within the past 24 hours have been reviewed.    Significant Imaging: I have reviewed all pertinent imaging results/findings within the past 24 hours.

## 2022-07-27 NOTE — DISCHARGE SUMMARY
'[place] - [facility] Medicine  Discharge Summary      Patient Name: Rosy Balderrama  MRN: [id number]  Patient Class: IP- Inpatient  Admission Date: 7/19/2022  Hospital Length of Stay: 8 days  Discharge Date and Time:  07/27/2022 12:56 PM  Attending Physician: Micheal Licona MD   Discharging Provider: Micheal Licona MD  Primary Care Provider: Maude Barrios MD      HPI:   Patient is a 89-year-old  female with past medical history of dementia, AFib, diabetes, hyperlipidemia who presents to the ED today after suffering a fall this morning.  History obtained via son due to patient's history of dementia.  When questioned on what happened, patient stated I am not sure ask my children.  Patient suffered a fall after going to the bathroom this a.m..  She was then taken to the ED.    In the ED, x-rays reveal spiral fracture left proximal femur with significant displacement.  She was given 1 dose of Rocephin for UTI.  Ortho notified. Hospital Medicine consulted and patient admitted.       Procedure(s) (LRB):  ORIF, FRACTURE, FEMUR (Left)      Hospital Course:   7/20: POD 1, status post operative fixation of left periprosthetic femur fracture. Eliquis started for VTE prophylaxis. Will need snf placement.   7/21: POD 2, status post operative fixation of left periprosthetic femur fracture. Cont eliquis. Hgb 6.9 this am. Not suspecting active bleed. Likely attributed to expected blood loss from surgery. Due to critical blood shortage, will not transfuse at this time as it is not emergent. Vital signs stable. Will repeat h/h this afternoon, may consider transfusing at that time if h/h continues to trend down.   7/22: POD 3, 1 unit prbc today. Hgb 6.3. SNF placement pending.     7/23: Pending SNF placement, ordered repeat CBC, vitals stable.   7/25: Postop day 6 status postoperative fixation of left periprosthetic femur fracture---awaiting placement  7/26: Postop day 7, patient still reports 10/10  pain but does not appear in any distress when seen this morning, comfortably eating breakfast. Continue to work with PT/OT. Awaiting SNF placement.  7/27: Postop day 8, patient resting comfortably and in no acute distress. Reports pain is well controlled this morning. Continue to work with PT/OT. Medically stable for transfer to SNF. Follow-up with Orthopedic Surgeon in 1-2 weeks. Patient clinically improved and medically stable for discharge to SNF with instructions to follow-up with PCP in 1-2 weeks.    Patient and/or family was seen on day of discharge by myself and was examined. They were stable for discharge. Discharge plan, follow up instructions, and contacts in case of further needs were discussed in detail.         Goals of Care Treatment Preferences:  Code Status: Full Code      Consults:   Consults (From admission, onward)        Status Ordering Provider     Inpatient virtual consult to Hospital Medicine  Once        Provider:  (Not yet assigned)    Completed TAWANNA APRIL ZAIRA     IP consult to case management  Once        Provider:  (Not yet assigned)    Completed LE, CHI     Inpatient consult to Orthopedic Surgery  Once        Provider:  (Not yet assigned)    Acknowledged LE, CHI          No new Assessment & Plan notes have been filed under this hospital service since the last note was generated.  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 Schedule an appointment as soon as possible for a visit in 2 week(s).    Specialty: Family Medicine  Why: Post-hospitalization Follow-up  Contact information:  [address]  [place] [id number] [phone]             Yulissa Marx MD. Schedule an appointment as soon as possible for a visit in 2 week(s).    Specialty: Orthopedic Surgery  Why: Post-hospitalization Follow-up  Contact information:  [address] [place] [address] [address] [place] [address] [phone]                       Patient Instructions:      Diet Cardiac     Notify your health care provider if you experience any of the following:  temperature >100.4     Notify your health care provider if you experience any of the following:  severe uncontrolled pain     Notify your health care provider if you experience any of the following:  redness, tenderness, or signs of infection (pain, swelling, redness, odor or green/yellow discharge around incision site)     Notify your health care provider if you experience any of the following:  difficulty breathing or increased cough     Notify your health care provider if you experience any of the following:  increased confusion or weakness     Notify your health care provider if you experience any of the following:  persistent dizziness, light-headedness, or visual disturbances     Activity as tolerated       Significant Diagnostic Studies: Labs:   CMP   Recent Labs   Lab 07/25/22  1452 07/26/22  1722    140   K 5.4* 4.2   * 109   CO2 19* 22*   * 143*   BUN 13 12   CREATININE 0.6 0.7   CALCIUM 8.5* 8.2*   PROT 5.7*  --    ALBUMIN 2.7*  --    BILITOT 0.9  --    ALKPHOS 110  --    AST 80*  --    ALT 62*  --    ANIONGAP 10 9   ESTGFRAFRICA >60 >60   EGFRNONAA >60 >60    and CBC   Recent Labs   Lab 07/25/22  1452 07/26/22  0344 07/27/22  0405   WBC 7.30 5.08 5.58   HGB 10.9* 9.2*  8.4*   HCT 32.8* 27.9* 26.1*   * 169 175       Pending Diagnostic Studies:     None         Medications:  Reconciled Home Medications:      Medication List      START taking these medications    acetaminophen 500 MG tablet  Commonly known as: TYLENOL  Take 1 tablet (500 mg total) by mouth every 6 (six) hours as needed for Pain.     apixaban 2.5 mg Tab  Commonly known as: ELIQUIS  Take 1 tablet (2.5 mg total) by mouth 2 (two) times daily. for 14 days     docusate sodium 100 MG capsule  Commonly known as: COLACE  Take 1 capsule (100 mg total) by mouth 2 (two) times daily.        CONTINUE taking these medications    A/G PRO ORAL  Take 2 tablets by mouth 2 (two) times a day.     aspirin 81 MG Chew  Take 81 mg by mouth nightly.     AZO CRANBERRY ORAL  Take 1 tablet by mouth 2 (two) times a day.     bumetanide 1 MG tablet  Commonly known as: BUMEX  Take 1 mg by mouth once daily.     CALTRATE 600 + D ORAL  Take 1 tablet by mouth once daily.     fexofenadine 180 MG tablet  Commonly known as: ALLEGRA  Take 180 mg by mouth once daily.     fish oil-omega-3 fatty acids 300-1,000 mg capsule  Take 1 capsule by mouth once daily.     I-CAPS ORAL  Take 1 capsule by mouth once daily.     memantine 10 MG Tab  Commonly known as: NAMENDA  Take 10 mg by mouth 2 (two) times daily.     metoprolol tartrate 25 MG tablet  Commonly known as: LOPRESSOR  Take 12.5 mg by mouth 2 (two) times daily.     MULTIVITAMIN ORAL  Take 1 tablet by mouth once daily.     naproxen sodium 220 MG tablet  Commonly known as: ANAPROX  Take 220 mg by mouth 2 (two) times daily with meals.     pravastatin 40 MG tablet  Commonly known as: PRAVACHOL  Take 40 mg by mouth once daily.     QUEtiapine 25 MG Tab  Commonly known as: SEROQUEL  Take 25 mg by mouth nightly.     raloxifene 60 mg tablet  Commonly known as: EVISTA  Take 60 mg by mouth once daily.     SITagliptin 100 MG Tab  Commonly known as: JANUVIA  Take 100 mg by mouth once daily.     vitamin D 1000 units  Tab  Commonly known as: VITAMIN D3  Take 1,000 Units by mouth once daily.        STOP taking these medications    amoxicillin-clavulanate 500-125mg 500-125 mg Tab  Commonly known as: AUGMENTIN     amoxicillin-clavulanate 875-125mg 875-125 mg per tablet  Commonly known as: AUGMENTIN     ascorbic acid (vitamin C) 500 MG tablet  Commonly known as: VITAMIN C     VITAMIN C ORAL            Indwelling Lines/Drains at time of discharge:   Lines/Drains/Airways     None                 Time spent on the discharge of patient: 35 minutes         Micheal Licona MD  Department of Hospital Medicine  '[place] - Telemetry ([facility])

## 2022-07-27 NOTE — DISCHARGE SUMMARY
'[place] - [facility] Medicine  Discharge Summary      Patient Name: Rosy Balderrama  MRN: [id number]  Patient Class: IP- Inpatient  Admission Date: 7/19/2022  Hospital Length of Stay: 8 days  Discharge Date and Time:  07/27/2022 12:56 PM  Attending Physician: Micheal Licona MD   Discharging Provider: Micheal Licona MD  Primary Care Provider: Maude Barrios MD      HPI:   Patient is a 89-year-old  female with past medical history of dementia, AFib, diabetes, hyperlipidemia who presents to the ED today after suffering a fall this morning.  History obtained via son due to patient's history of dementia.  When questioned on what happened, patient stated I am not sure ask my children.  Patient suffered a fall after going to the bathroom this a.m..  She was then taken to the ED.    In the ED, x-rays reveal spiral fracture left proximal femur with significant displacement.  She was given 1 dose of Rocephin for UTI.  Ortho notified. Hospital Medicine consulted and patient admitted.       Procedure(s) (LRB):  ORIF, FRACTURE, FEMUR (Left)      Hospital Course:   7/20: POD 1, status post operative fixation of left periprosthetic femur fracture. Eliquis started for VTE prophylaxis. Will need snf placement.   7/21: POD 2, status post operative fixation of left periprosthetic femur fracture. Cont eliquis. Hgb 6.9 this am. Not suspecting active bleed. Likely attributed to expected blood loss from surgery. Due to critical blood shortage, will not transfuse at this time as it is not emergent. Vital signs stable. Will repeat h/h this afternoon, may consider transfusing at that time if h/h continues to trend down.   7/22: POD 3, 1 unit prbc today. Hgb 6.3. SNF placement pending.     7/23: Pending SNF placement, ordered repeat CBC, vitals stable.   7/25: Postop day 6 status postoperative fixation of left periprosthetic femur fracture---awaiting placement  7/26: Postop day 7, patient still reports 10/10  pain but does not appear in any distress when seen this morning, comfortably eating breakfast. Continue to work with PT/OT. Awaiting SNF placement.  7/27: Postop day 8, patient resting comfortably and in no acute distress. Reports pain is well controlled this morning. Continue to work with PT/OT. Medically stable for transfer to SNF. Follow-up with Orthopedic Surgeon in 1-2 weeks. Patient clinically improved and medically stable for discharge to SNF with instructions to follow-up with PCP in 1-2 weeks.    Patient and/or family was seen on day of discharge by myself and was examined. They were stable for discharge. Discharge plan, follow up instructions, and contacts in case of further needs were discussed in detail.         Goals of Care Treatment Preferences:  Code Status: Full Code      Consults:   Consults (From admission, onward)        Status Ordering Provider     Inpatient virtual consult to Hospital Medicine  Once        Provider:  (Not yet assigned)    Completed TAWANNA APRIL ZAIRA     IP consult to case management  Once        Provider:  (Not yet assigned)    Completed LE, CHI     Inpatient consult to Orthopedic Surgery  Once        Provider:  (Not yet assigned)    Acknowledged LE, CHI          No new Assessment & Plan notes have been filed under this hospital service since the last note was generated.  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 Schedule an appointment as soon as possible for a visit in 2 week(s).    Specialty: Family Medicine  Why: Post-hospitalization Follow-up  Contact information:  [address]  [place] [id number] [phone]             Yulissa Marx MD. Schedule an appointment as soon as possible for a visit in 2 week(s).    Specialty: Orthopedic Surgery  Why: Post-hospitalization Follow-up  Contact information:  [address] [place] [address] [address] [place] [address] [phone]                       Patient Instructions:      Diet Cardiac     Notify your health care provider if you experience any of the following:  temperature >100.4     Notify your health care provider if you experience any of the following:  severe uncontrolled pain     Notify your health care provider if you experience any of the following:  redness, tenderness, or signs of infection (pain, swelling, redness, odor or green/yellow discharge around incision site)     Notify your health care provider if you experience any of the following:  difficulty breathing or increased cough     Notify your health care provider if you experience any of the following:  increased confusion or weakness     Notify your health care provider if you experience any of the following:  persistent dizziness, light-headedness, or visual disturbances     Activity as tolerated       Significant Diagnostic Studies: Labs:   CMP   Recent Labs   Lab 07/25/22  1452 07/26/22  1722    140   K 5.4* 4.2   * 109   CO2 19* 22*   * 143*   BUN 13 12   CREATININE 0.6 0.7   CALCIUM 8.5* 8.2*   PROT 5.7*  --    ALBUMIN 2.7*  --    BILITOT 0.9  --    ALKPHOS 110  --    AST 80*  --    ALT 62*  --    ANIONGAP 10 9   ESTGFRAFRICA >60 >60   EGFRNONAA >60 >60    and CBC   Recent Labs   Lab 07/25/22  1452 07/26/22  0344 07/27/22  0405   WBC 7.30 5.08 5.58   HGB 10.9* 9.2*  8.4*   HCT 32.8* 27.9* 26.1*   * 169 175       Pending Diagnostic Studies:     None         Medications:  Reconciled Home Medications:      Medication List      START taking these medications    acetaminophen 500 MG tablet  Commonly known as: TYLENOL  Take 1 tablet (500 mg total) by mouth every 6 (six) hours as needed for Pain.     apixaban 2.5 mg Tab  Commonly known as: ELIQUIS  Take 1 tablet (2.5 mg total) by mouth 2 (two) times daily. for 14 days     docusate sodium 100 MG capsule  Commonly known as: COLACE  Take 1 capsule (100 mg total) by mouth 2 (two) times daily.        CONTINUE taking these medications    A/G PRO ORAL  Take 2 tablets by mouth 2 (two) times a day.     aspirin 81 MG Chew  Take 81 mg by mouth nightly.     AZO CRANBERRY ORAL  Take 1 tablet by mouth 2 (two) times a day.     bumetanide 1 MG tablet  Commonly known as: BUMEX  Take 1 mg by mouth once daily.     CALTRATE 600 + D ORAL  Take 1 tablet by mouth once daily.     fexofenadine 180 MG tablet  Commonly known as: ALLEGRA  Take 180 mg by mouth once daily.     fish oil-omega-3 fatty acids 300-1,000 mg capsule  Take 1 capsule by mouth once daily.     I-CAPS ORAL  Take 1 capsule by mouth once daily.     memantine 10 MG Tab  Commonly known as: NAMENDA  Take 10 mg by mouth 2 (two) times daily.     metoprolol tartrate 25 MG tablet  Commonly known as: LOPRESSOR  Take 12.5 mg by mouth 2 (two) times daily.     MULTIVITAMIN ORAL  Take 1 tablet by mouth once daily.     naproxen sodium 220 MG tablet  Commonly known as: ANAPROX  Take 220 mg by mouth 2 (two) times daily with meals.     pravastatin 40 MG tablet  Commonly known as: PRAVACHOL  Take 40 mg by mouth once daily.     QUEtiapine 25 MG Tab  Commonly known as: SEROQUEL  Take 25 mg by mouth nightly.     raloxifene 60 mg tablet  Commonly known as: EVISTA  Take 60 mg by mouth once daily.     SITagliptin 100 MG Tab  Commonly known as: JANUVIA  Take 100 mg by mouth once daily.     vitamin D 1000 units  Tab  Commonly known as: VITAMIN D3  Take 1,000 Units by mouth once daily.        STOP taking these medications    amoxicillin-clavulanate 500-125mg 500-125 mg Tab  Commonly known as: AUGMENTIN     amoxicillin-clavulanate 875-125mg 875-125 mg per tablet  Commonly known as: AUGMENTIN     ascorbic acid (vitamin C) 500 MG tablet  Commonly known as: VITAMIN C     VITAMIN C ORAL            Indwelling Lines/Drains at time of discharge:   Lines/Drains/Airways     None                 Time spent on the discharge of patient: 35 minutes         The attending portion of this evaluation, treatment, and documentation was performed per Micheal Licona MD via Telemedicine AudioVisual using the secure [employer] software platform with 2 way audio/video. The provider was located off-site and the patient is located in the hospital. The aforementioned video software was utilized to document the relevant history and physical exam    Micheal Licona MD  Department of Hospital Medicine  [facility])

## 2022-07-27 NOTE — PLAN OF CARE
[place] - Telemetry (Hospital)  Discharge Final Note    Primary Care Provider: Maude Barrios MD    Expected Discharge Date: 7/27/2022    Final Discharge Note (most recent)     Final Note - 07/27/22 1335        Final Note    Assessment Type Final Discharge Note     Anticipated Discharge Disposition Skilled Nursing Facility        Post-Acute Status    Post-Acute Authorization Placement     Post-Acute Placement Status Set-up Complete/Auth obtained   [facility]                Important Message from Medicare  Important Message from Medicare regarding Discharge Appeal Rights: Given to patient/caregiver, Explained to patient/caregiver, Signed/date by patient/caregiver     Date IMM was signed: 07/21/22  Time IMM was signed: 1438    Contact Info     Maude Barrios MD   Specialty: Family Medicine   Relationship: PCP - General    [address]  [place] [address]   Phone: [phone]       Next Steps: Schedule an appointment as soon as possible for a visit in 2 week(s)    Instructions: Post-hospitalization Follow-up    Yulissa Marx MD   Specialty: Orthopedic Surgery    [address] [place] [address] [address] [place] [address]   Phone: [phone]       Next Steps: Schedule an appointment as soon as possible for a visit in 2 week(s)    Instructions: Post-hospitalization Follow-up

## 2022-07-27 NOTE — PT/OT/SLP PROGRESS
"Physical Therapy  Treatment    Rosy Balderrama   MRN: [id number]   Admitting Diagnosis: Periprosthetic fracture of femur following total replacement of hip    PT Received On: 07/27/22  PT Start Time: 1330     PT Stop Time: 1430    PT Total Time (min): 60 min       Billable Minutes:  Therapeutic Activity 30    Treatment Type: Treatment  PT/PTA: PTA     PTA Visit Number: 2       General Precautions: Standard, airborne, contact, droplet, fall  Orthopedic Precautions: LLE toe touch weight bearing   Braces: N/A  Respiratory Status: Room air         Subjective:  Communicated with patient's nurse, Heidi, and completed Epic chart review prior to session.  Patient agreed to PT session.   "I want to get better. Even if I say no, make me do it anyway."    Pain/Comfort  Pain Rating 1: 10/10 (WITH MOVEMENT BUT RESOLVES W/ REST)    Objective:   Patient found with: peripheral IV, bed alarm, telemetry, Other (comments) (AVASYS)    Functional Mobility:  Supine > sit EOB: Max A of 2 (increased time to complete due to pain)    Forward scoot towards EOB: Max A of 2 (significant difficulty coordinating task)    x2 attempts STS at RW but patient unable to clear bottom from bed on either attempt despite Max A of 2  Upon 3rd attempt, it was noted that patient's IV in R hand had been dislodged and was bleeding significantly.   Bleeding stopped with elevation of RUE and compression with pad. Nurse notified immediately for further assessment.  Patient remained in self supported sitting EOB throughout the process (~15 min total) w/ CGA-SBA to maintain sitting balance.  Intermittent cues required to reorient trunk to midline position.    Patient was then noted to be soiled.    Sit > supine: Max A of 2 (increased time to complete due to pain and difficulty coordinating task)    Roll R/L to clean/change brief & sheets: Max A of 2 (increased time to complete due to pain)    Supine scoot towards HOB: Total A of 2    Supine > sit EOB: Total A of 2 "     Forward scoot towards EOB: Max A of 2    Educated patient on importance of increased tolerance to upright position in order to promote CV endurance. Encouraged patient to utilize elevated HOB/ supported sitting EOB (only when supervised) to achieve simulated chair position until they are able to safely complete T/F. Encouraged patient to perform AROM TE to BLE throughout the day in order to prevent further loss of ROM and strength. Re enforced importance of utilizing call light to meet needs in room. Patient verbalized understanding of all education and agreed to comply.    AM-PAC 6 CLICK MOBILITY  How much help from another person does this patient currently need?   1 = Unable, Total/Dependent Assistance  2 = A lot, Maximum/Moderate Assistance  3 = A little, Minimum/Contact Guard/Supervision  4 = None, Modified [place]/Independent    Turning over in bed (including adjusting bedclothes, sheets and blankets)?: 2  Sitting down on and standing up from a chair with arms (e.g., wheelchair, bedside commode, etc.): 1  Moving from lying on back to sitting on the side of the bed?: 2  Moving to and from a bed to a chair (including a wheelchair)?: 1  Need to walk in hospital room?: 1  Climbing 3-5 steps with a railing?: 1  Basic Mobility Total Score: 8    AM-PAC Raw Score CMS G-Code Modifier Level of Impairment Assistance   6 % Total / Unable   7 - 9 CM 80 - 100% Maximal Assist   10 - 14 CL 60 - 80% Moderate Assist   15 - 19 CK 40 - 60% Moderate Assist   20 - 22 CJ 20 - 40% Minimal Assist   23 CI 1-20% SBA / CGA   24 CH 0% Independent/ Mod I     Patient left sitting edge of bed (supported with pillows) with call button in reach, nurse notified and family member present sitting directly next to patient. Family member reported she will be present until 7 p.m. and was willing to sit next to patient until she was ready to return to bed. Verbalized understanding to notify nursing staff if she needs to leave and/or  when patient is ready to return to bed.    Assessment:  Rosy Balderrama is a 89 y.o. female with a medical diagnosis of Periprosthetic fracture of femur following total replacement of hip and presents with overall decline in functional mobility. Patient would continue to benefit from skilled PT to address functional limitations listed below in order to return to PLOF/decrease caregiver burden.     Rehab identified problem list/impairments: Rehab identified problem list/impairments: weakness, impaired endurance, impaired self care skills, impaired functional mobility, gait instability, impaired balance, impaired cognition, decreased coordination, decreased lower extremity function, decreased safety awareness, pain, decreased ROM, impaired coordination, impaired skin, edema, impaired cardiopulmonary response to activity, orthopedic precautions    Rehab potential is fair.    Activity tolerance: Poor    Discharge recommendations: Discharge Facility/Level of Care Needs: nursing facility, skilled     Barriers to discharge:      Equipment recommendations: Equipment Needed After Discharge: none     GOALS:   Multidisciplinary Problems     Physical Therapy Goals        Problem: Physical Therapy    Goal Priority Disciplines Outcome Goal Variances Interventions   Physical Therapy Goal     PT, PT/OT Ongoing, Not Progressing     Description: LT/3/22  1. PT WILL COMPLETE BED MOBILITY WITH MOD A  2. PT WILL STAND TTWB WITH RW AND MAX A  3. PT WILL COMPLETE B LE TE X 10 REPS FOR INC ROM AND STRENGTHENING  4. PT WILL COMPLETE STAND PIVOT T/F TO CHAIR WITH TTWB AND MAX A                   PLAN:    Patient to be seen 3 x/week  to address the above listed problems via therapeutic activities, therapeutic exercises, gait training, neuromuscular re-education  Plan of Care expires: 22  Plan of Care reviewed with: patient         2022

## 2022-07-27 NOTE — DISCHARGE INSTRUCTIONS
Thank you for choosing [facility] for your medical care. The primary doctor who is taking care of you at the time of your discharge is Micheal Licona MD.     You were admitted to the hospital with Periprosthetic fracture of femur following total replacement of hip.     Please note your discharge instructions, including diet/activity restrictions, follow-up appointments, and medication changes.  If you have any questions about your medical issues, prescriptions, or any other questions, please feel free to contact the [facility] at [phone] and we will help.    If you are previously with Home health, outpatient PT/OT or under a therapy program, you are cleared to return to those programs.    Please direct all long term medication refills and follow up to your primary care provider, Maude Barrios MD. Thank you again for letting us take care of your health care needs.    Please note the following discharge instructions per your discharging physician-  Follow-up with your PCP in 1-2 weeks.  Follow-up with your Orthopedic Surgeon in 1-2 weeks.

## 2022-07-27 NOTE — PLAN OF CARE
[facility]    FACILITY TRANSFER ORDERS      Patient Name: Rosy Balderrama  YOB: 1931    PCP: Maude Barrios MD   PCP Address: [facility] [address] [place] [id number]  PCP Phone Number: [phone]  PCP Fax: [phone]    Encounter Date: 07/27/2022    Admit to: SNF at [facility]    Vital Signs:  Routine    Diagnoses:   Active Hospital Problems    Diagnosis  POA    *Periprosthetic fracture of femur following total replacement of hip [M97.8XXA, Z96.649]  Not Applicable    COVID-19 [U07.1]  No    Anemia [D64.9]  Yes    Paroxysmal atrial fibrillation [I48.0]  Yes    Osteoporosis [M81.0]  Yes    Closed left femoral fracture [S72.92XA]  Yes    Hyperlipidemia [E78.5]  Yes    Diabetes mellitus type 2, controlled, without complications [E11.9]  Yes      Resolved Hospital Problems   No resolved problems to display.       Allergies:  Review of patient's allergies indicates:   Allergen Reactions    Alendronate Other (See Comments)    Atorvastatin Other (See Comments)    Ciprofloxacin Other (See Comments)    Codeine Itching    Diazepam Itching    Ibuprofen Other (See Comments)    Oxycodone-acetaminophen Other (See Comments) and Itching    Rofecoxib Other (See Comments)    Seldane     Simvastatin Other (See Comments)       Diet: cardiac diet    Activities: Activity as tolerated    Goals of Care Treatment Preferences:  Code Status: Full Code      Nursing: Turn patient q2h.     Labs: N/a N/a     CONSULTS:    Physical Therapy to evaluate and treat.  and Occupational Therapy to evaluate and treat.    MISCELLANEOUS CARE:  Routine Skin for Bedridden Patients: Apply moisture barrier cream to all skin folds and wet areas in perineal area daily and after baths and all bowel movements.    WOUND CARE ORDERS  Yes: Surgical Wound:  Location: Left Hip    Consult ET nurse        Apply the following to wound:   Wet to Damp dressing: daily (frequency)    Medications: Review discharge  medications with patient and family and provide education.      Current Discharge Medication List      START taking these medications    Details   acetaminophen (TYLENOL) 500 MG tablet Take 1 tablet (500 mg total) by mouth every 6 (six) hours as needed for Pain.  Qty: 90 tablet, Refills: 0      apixaban (ELIQUIS) 2.5 mg Tab Take 1 tablet (2.5 mg total) by mouth 2 (two) times daily. for 14 days  Qty: 28 tablet, Refills: 0      docusate sodium (COLACE) 100 MG capsule Take 1 capsule (100 mg total) by mouth 2 (two) times daily.  Qty: 60 capsule, Refills: 2         CONTINUE these medications which have NOT CHANGED    Details   AMINO ACIDS/MINERALS (A/G PRO ORAL) Take 2 tablets by mouth 2 (two) times a day.      ANTIOX#10/OM3/DHA/EPA/LUT/ZEAX (I-CAPS ORAL) Take 1 capsule by mouth once daily.      aspirin 81 MG Chew Take 81 mg by mouth nightly.      bumetanide (BUMEX) 1 MG tablet Take 1 mg by mouth once daily.      CALCIUM CARBONATE/VITAMIN D3 (CALTRATE 600 + D ORAL) Take 1 tablet by mouth once daily.      cranberry fruit concentrate (AZO CRANBERRY ORAL) Take 1 tablet by mouth 2 (two) times a day.      fexofenadine (ALLEGRA) 180 MG tablet Take 180 mg by mouth once daily.      memantine (NAMENDA) 10 MG Tab Take 10 mg by mouth 2 (two) times daily.      metoprolol tartrate (LOPRESSOR) 25 MG tablet Take 12.5 mg by mouth 2 (two) times daily.      MULTIVITAMIN ORAL Take 1 tablet by mouth once daily.      naproxen sodium (ANAPROX) 220 MG tablet Take 220 mg by mouth 2 (two) times daily with meals.      omega-3 fatty acids/fish oil (FISH OIL-OMEGA-3 FATTY ACIDS) 300-1,000 mg capsule Take 1 capsule by mouth once daily.      pravastatin (PRAVACHOL) 40 MG tablet Take 40 mg by mouth once daily.      QUEtiapine (SEROQUEL) 25 MG Tab Take 25 mg by mouth nightly.      raloxifene (EVISTA) 60 mg tablet Take 60 mg by mouth once daily.      sitagliptan (JANUVIA) 100 MG Tab Take 100 mg by mouth once daily.      vitamin D (VITAMIN D3) 1000 units  Tab Take 1,000 Units by mouth once daily.         STOP taking these medications       amoxicillin-clavulanate 500-125mg (AUGMENTIN) 500-125 mg Tab Comments:   Reason for Stopping:         amoxicillin-clavulanate 875-125mg (AUGMENTIN) 875-125 mg per tablet Comments:   Reason for Stopping:         ASCORBATE CALCIUM (VITAMIN C ORAL) Comments:   Reason for Stopping:         ascorbic acid, vitamin C, (VITAMIN C) 500 MG tablet Comments:   Reason for Stopping:         metoprolol succinate (TOPROL-XL) 25 MG 24 hr tablet Comments:   Reason for Stopping:                  Immunizations Administered as of 7/27/2022  Reviewed on 7/25/2022    No immunizations on file.              _____________________  Micheal Licona MD  07/27/2022

## 2022-07-28 ENCOUNTER — TELEPHONE (OUTPATIENT)
Dept: ORTHOPEDICS | Facility: CLINIC | Age: 87
End: 2022-07-28
Payer: MEDICARE

## 2022-07-28 NOTE — TELEPHONE ENCOUNTER
Spoke with Aletha/ Felice Ibanez and informed her of Dr Marx's postop orders per her surgical note. She states that she does not have the Aquacel Dressing to change her dressing and that the current one does appear to be full. She will clean the surgery site with Betadine and apply Adaptic and ABD padding until patient follows up in clinic. Understanding verbalized.

## 2022-07-28 NOTE — TELEPHONE ENCOUNTER
----- Message from Cori De La Cruz sent at 7/28/2022 11:23 AM CDT -----  Contact: Aletha with [facility] phone [phone]  Aletha with [facility] phone [phone], Calling to get wound care instructions. Please call her. Thanks.

## 2022-07-29 ENCOUNTER — TELEPHONE (OUTPATIENT)
Dept: ORTHOPEDICS | Facility: CLINIC | Age: 87
End: 2022-07-29
Payer: MEDICARE

## 2022-07-29 NOTE — TELEPHONE ENCOUNTER
Spoke with Sierra/ [facility] and confirmed patient's appointments that were already scheduled. Understanding verbalized of appointment dates, times and location.

## 2022-07-29 NOTE — TELEPHONE ENCOUNTER
----- Message from Kenya Shine sent at 7/29/2022 12:31 PM CDT -----  Sierra with the [facility] called to schedule a post hospital follow up. Call Sierra back at [phone]. Thx. EL

## 2022-08-02 ENCOUNTER — TELEPHONE (OUTPATIENT)
Dept: ORTHOPEDICS | Facility: CLINIC | Age: 87
End: 2022-08-02
Payer: MEDICARE

## 2022-08-02 NOTE — TELEPHONE ENCOUNTER
Spoke with Aletha/ Nurse Felice [facility] and informed her that the patient is due to have her sutures/ staples removed on tomorrow. We will obtain an order to have the nurse remove her sutures /staples if her wound looks good and fax it over on tomorrow. Understanding verbalized.

## 2022-08-02 NOTE — TELEPHONE ENCOUNTER
----- Message from Ashleigh Chaudhary LPN sent at 8/2/2022 10:24 AM CDT -----  Contact: Dariusz Esquivel    ----- Message -----  From: Eli Shabazz  Sent: 8/2/2022  10:20 AM CDT  To: Serena Desai - Ortho Trauma    Pt is on isolation and is calling to resched the appt that the pt has scheduled until after the 10th and can be reached at [phone]//thanks/dbw

## 2022-08-03 DIAGNOSIS — M89.8X5 PAIN OF LEFT FEMUR: Primary | ICD-10-CM

## 2022-08-17 ENCOUNTER — HOSPITAL ENCOUNTER (OUTPATIENT)
Dept: RADIOLOGY | Facility: HOSPITAL | Age: 87
Discharge: HOME | End: 2022-08-17
Attending: ORTHOPAEDIC SURGERY
Payer: MEDICARE

## 2022-08-17 ENCOUNTER — HOSPITAL ENCOUNTER (EMERGENCY)
Facility: HOSPITAL | Age: 87
Discharge: HOME | End: 2022-08-17
Attending: EMERGENCY MEDICINE
Payer: MEDICARE

## 2022-08-17 ENCOUNTER — OFFICE VISIT (OUTPATIENT)
Dept: ORTHOPEDICS | Facility: CLINIC | Age: 87
End: 2022-08-17
Payer: MEDICARE

## 2022-08-17 VITALS
TEMPERATURE: 99 F | DIASTOLIC BLOOD PRESSURE: 70 MMHG | RESPIRATION RATE: 18 BRPM | SYSTOLIC BLOOD PRESSURE: 97 MMHG | HEART RATE: 72 BPM | OXYGEN SATURATION: 99 %

## 2022-08-17 VITALS
WEIGHT: 166.88 LBS | HEIGHT: 60 IN | SYSTOLIC BLOOD PRESSURE: 118 MMHG | BODY MASS INDEX: 32.76 KG/M2 | DIASTOLIC BLOOD PRESSURE: 58 MMHG

## 2022-08-17 DIAGNOSIS — R41.0 CONFUSION: Primary | ICD-10-CM

## 2022-08-17 DIAGNOSIS — R10.2 PAIN IN PELVIS: ICD-10-CM

## 2022-08-17 DIAGNOSIS — M97.8XXD PERIPROSTHETIC FRACTURE OF FEMUR FOLLOWING TOTAL REPLACEMENT OF HIP, SUBSEQUENT ENCOUNTER: Primary | ICD-10-CM

## 2022-08-17 DIAGNOSIS — M89.8X5 PAIN OF LEFT FEMUR: Primary | ICD-10-CM

## 2022-08-17 DIAGNOSIS — R41.82 ALTERED MENTAL STATUS: ICD-10-CM

## 2022-08-17 DIAGNOSIS — F03.90 DEMENTIA WITHOUT BEHAVIORAL DISTURBANCE, UNSPECIFIED DEMENTIA TYPE: ICD-10-CM

## 2022-08-17 DIAGNOSIS — Z96.649 PERIPROSTHETIC FRACTURE OF FEMUR FOLLOWING TOTAL REPLACEMENT OF HIP, SUBSEQUENT ENCOUNTER: Primary | ICD-10-CM

## 2022-08-17 DIAGNOSIS — M89.8X5 PAIN OF LEFT FEMUR: ICD-10-CM

## 2022-08-17 LAB
ALBUMIN SERPL BCP-MCNC: 3.5 G/DL (ref 3.5–5.2)
ALP SERPL-CCNC: 104 U/L (ref 55–135)
ALT SERPL W/O P-5'-P-CCNC: 15 U/L (ref 10–44)
ANION GAP SERPL CALC-SCNC: 12 MMOL/L (ref 8–16)
AST SERPL-CCNC: 20 U/L (ref 10–40)
BASOPHILS # BLD AUTO: 0 K/UL (ref 0–0.2)
BASOPHILS NFR BLD: 0 % (ref 0–1.9)
BILIRUB SERPL-MCNC: 0.5 MG/DL (ref 0.1–1)
BILIRUB UR QL STRIP: NEGATIVE
BUN SERPL-MCNC: 19 MG/DL (ref 8–23)
CALCIUM SERPL-MCNC: 9.5 MG/DL (ref 8.7–10.5)
CHLORIDE SERPL-SCNC: 104 MMOL/L (ref 95–110)
CLARITY UR: CLEAR
CO2 SERPL-SCNC: 24 MMOL/L (ref 23–29)
COLOR UR: YELLOW
CREAT SERPL-MCNC: 0.9 MG/DL (ref 0.5–1.4)
DIFFERENTIAL METHOD: ABNORMAL
EOSINOPHIL # BLD AUTO: 0.2 K/UL (ref 0–0.5)
EOSINOPHIL NFR BLD: 2.1 % (ref 0–8)
ERYTHROCYTE [DISTWIDTH] IN BLOOD BY AUTOMATED COUNT: 15.9 % (ref 11.5–14.5)
EST. GFR  (NO RACE VARIABLE): >60 ML/MIN/1.73 M^2
GLUCOSE SERPL-MCNC: 108 MG/DL (ref 70–110)
GLUCOSE UR QL STRIP: NEGATIVE
HCT VFR BLD AUTO: 31.6 % (ref 37–48.5)
HGB BLD-MCNC: 10 G/DL (ref 12–16)
HGB UR QL STRIP: NEGATIVE
IMM GRANULOCYTES # BLD AUTO: 0.07 K/UL (ref 0–0.04)
IMM GRANULOCYTES NFR BLD AUTO: 0.9 % (ref 0–0.5)
KETONES UR QL STRIP: NEGATIVE
LEUKOCYTE ESTERASE UR QL STRIP: NEGATIVE
LYMPHOCYTES # BLD AUTO: 1.4 K/UL (ref 1–4.8)
LYMPHOCYTES NFR BLD: 18.3 % (ref 18–48)
MCH RBC QN AUTO: 30.8 PG (ref 27–31)
MCHC RBC AUTO-ENTMCNC: 31.6 G/DL (ref 32–36)
MCV RBC AUTO: 97 FL (ref 82–98)
MONOCYTES # BLD AUTO: 0.6 K/UL (ref 0.3–1)
MONOCYTES NFR BLD: 7.6 % (ref 4–15)
NEUTROPHILS # BLD AUTO: 5.5 K/UL (ref 1.8–7.7)
NEUTROPHILS NFR BLD: 71.1 % (ref 38–73)
NITRITE UR QL STRIP: NEGATIVE
NRBC BLD-RTO: 0 /100 WBC
PH UR STRIP: 7 [PH] (ref 5–8)
PLATELET # BLD AUTO: 212 K/UL (ref 150–450)
PMV BLD AUTO: 10.4 FL (ref 9.2–12.9)
POTASSIUM SERPL-SCNC: 4.7 MMOL/L (ref 3.5–5.1)
PROT SERPL-MCNC: 6.8 G/DL (ref 6–8.4)
PROT UR QL STRIP: NEGATIVE
RBC # BLD AUTO: 3.25 M/UL (ref 4–5.4)
SODIUM SERPL-SCNC: 140 MMOL/L (ref 136–145)
SP GR UR STRIP: 1.01 (ref 1–1.03)
URN SPEC COLLECT METH UR: NORMAL
UROBILINOGEN UR STRIP-ACNC: NEGATIVE EU/DL
WBC # BLD AUTO: 7.74 K/UL (ref 3.9–12.7)

## 2022-08-17 PROCEDURE — 99024 POSTOP FOLLOW-UP VISIT: CPT | Mod: POP,,, | Performed by: PHYSICIAN ASSISTANT

## 2022-08-17 PROCEDURE — 99999 PR PBB SHADOW E&M-EST. PATIENT-LVL III: CPT | Mod: PBBFAC,,, | Performed by: PHYSICIAN ASSISTANT

## 2022-08-17 PROCEDURE — 81003 URINALYSIS AUTO W/O SCOPE: CPT | Performed by: EMERGENCY MEDICINE

## 2022-08-17 PROCEDURE — P9612 CATHETERIZE FOR URINE SPEC: HCPCS

## 2022-08-17 PROCEDURE — 99999 PR PBB SHADOW E&M-EST. PATIENT-LVL III: ICD-10-PCS | Mod: PBBFAC,,, | Performed by: PHYSICIAN ASSISTANT

## 2022-08-17 PROCEDURE — 99284 EMERGENCY DEPT VISIT MOD MDM: CPT | Mod: 25,27

## 2022-08-17 PROCEDURE — 73552 X-RAY EXAM OF FEMUR 2/>: CPT | Mod: TC,LT

## 2022-08-17 PROCEDURE — 99024 PR POST-OP FOLLOW-UP VISIT: ICD-10-PCS | Mod: POP,,, | Performed by: PHYSICIAN ASSISTANT

## 2022-08-17 PROCEDURE — 72170 X-RAY EXAM OF PELVIS: CPT | Mod: TC

## 2022-08-17 PROCEDURE — 80053 COMPREHEN METABOLIC PANEL: CPT | Performed by: EMERGENCY MEDICINE

## 2022-08-17 PROCEDURE — 99213 OFFICE O/P EST LOW 20 MIN: CPT | Mod: PBBFAC,25 | Performed by: PHYSICIAN ASSISTANT

## 2022-08-17 PROCEDURE — 85025 COMPLETE CBC W/AUTO DIFF WBC: CPT | Performed by: EMERGENCY MEDICINE

## 2022-08-17 RX ORDER — TRAMADOL HYDROCHLORIDE 50 MG/1
50 TABLET ORAL EVERY 8 HOURS PRN
COMMUNITY

## 2022-08-17 NOTE — ED PROVIDER NOTES
SCRIBE #1 NOTE: I, Miguel Raymundo, am scribing for, and in the presence of, Rudy Mijares Jr., MD. I have scribed the entire note.      History      Chief Complaint   Patient presents with    Altered Mental Status     Was getting rehab for femur fracture. Has dementia and staff called for AMS.  Family state she is behaving the same as she always does        Review of patient's allergies indicates:   Allergen Reactions    Alendronate Other (See Comments)    Atorvastatin Other (See Comments)    Ciprofloxacin Other (See Comments)    Codeine Itching    Diazepam Itching    Ibuprofen Other (See Comments)    Oxycodone-acetaminophen Other (See Comments) and Itching    Rofecoxib Other (See Comments)    Seldane     Simvastatin Other (See Comments)        HPI   HPI    8/17/2022, 2:19 PM   History obtained from EMS and pt's friend  HPI and ROS limited secondary to pt's dementia       History of Present Illness: Rosy Balderrama is a 89 y.o. female patient with a PMHx of DM2, dementia who presents to the Emergency Department for AMS, onset just PTA. Pt was sent to the ED from clinic following a post-op appointment after staff noted that the pt was altered. Pt is currently a resident at [facility] following a L femur fracture. Per EMS, pt's friend reports that she is currently at her baseline mental status and has not been altered. Symptoms are moderate in severity. No mitigating or exacerbating factors reported. No associated sxs reported. No prior Tx reported. No further complaints or concerns at this time.     Arrival mode: EMS    PCP: Maude Barrios MD       Past Medical History:  Past Medical History:   Diagnosis Date    *Atrial fibrillation     Allergy     Blood transfusion     Cataract     Diabetes mellitus type II     Hyperlipidemia     Osteoporosis        Past Surgical History:  Past Surgical History:   Procedure Laterality Date    ANKLE SURGERY      broken ankle    BILATERAL SALPINGOOPHORECTOMY       BREAST BIOPSY      CARPAL TUNNEL RELEASE      CHOLECYSTECTOMY      COLONOSCOPY      ERCP      ESOPHAGOGASTRODUODENOSCOPY      FRACTURE SURGERY      KNEE CARTILAGE SURGERY      ORIF FEMUR FRACTURE Left 7/19/2022    Procedure: ORIF, FRACTURE, FEMUR;  Surgeon: Yulissa Marx MD;  Location: [facility];  Service: Orthopedics;  Laterality: Left;  Periprosthetic femur fracture    TOTAL ABDOMINAL HYSTERECTOMY      TOTAL HIP ARTHROPLASTY      TUBAL LIGATION           Family History:  Family History   Problem Relation Age of Onset    Cancer Mother     Cancer Sister     Diabetes Brother        Social History:  Social History     Tobacco Use    Smoking status: Never Smoker    Smokeless tobacco: Never Used   Substance and Sexual Activity    Alcohol use: No    Drug use: No    Sexual activity: Not on file       ROS   Review of Systems   Unable to perform ROS: Dementia     Physical Exam      Initial Vitals [08/17/22 1407]   BP Pulse Resp Temp SpO2   (!) 153/73 86 18 98.9 °F (37.2 °C) 99 %      MAP       --          Physical Exam  Nursing Notes and Vital Signs Reviewed.  Constitutional: Patient is in no acute distress. Well-developed and well-nourished.  Head: Atraumatic. Normocephalic.  Eyes: PERRL. EOM intact. Conjunctivae are not pale. No scleral icterus.  ENT: Mucous membranes are moist. Oropharynx is clear and symmetric.    Neck: Supple. Full ROM.   Cardiovascular: Regular rate. Regular rhythm. No murmurs, rubs, or gallops. Distal pulses are 2+ and symmetric.  Pulmonary/Chest: No respiratory distress. Clear to auscultation bilaterally. No wheezing or rales.  Abdominal: Soft and non-distended.  There is no tenderness.  No rebound, guarding, or rigidity.   Musculoskeletal: Moves all extremities. No obvious deformities. No edema.  Skin: Warm and dry.  Neurological:  Awake. GCS 14, at baseline per family. Normal speech. No acute focal neurological deficits are appreciated.    ED Course    Procedures  ED Vital  Signs:  Vitals:    08/17/22 1407   BP: (!) 153/73   Pulse: 86   Resp: 18   Temp: 98.9 °F (37.2 °C)   TempSrc: Oral   SpO2: 99%       Abnormal Lab Results:  Labs Reviewed   CBC W/ AUTO DIFFERENTIAL - Abnormal; Notable for the following components:       Result Value    RBC 3.25 (*)     Hemoglobin 10.0 (*)     Hematocrit 31.6 (*)     MCHC 31.6 (*)     RDW 15.9 (*)     Immature Granulocytes 0.9 (*)     Immature Grans (Abs) 0.07 (*)     All other components within normal limits   COMPREHENSIVE METABOLIC PANEL   URINALYSIS, REFLEX TO URINE CULTURE    Narrative:     Specimen Source->Urine        All Lab Results:  Results for orders placed or performed during the hospital encounter of 08/17/22   CBC auto differential   Result Value Ref Range    WBC 7.74 3.90 - 12.70 K/uL    RBC 3.25 (L) 4.00 - 5.40 M/uL    Hemoglobin 10.0 (L) 12.0 - 16.0 g/dL    Hematocrit 31.6 (L) 37.0 - 48.5 %    MCV 97 82 - 98 fL    MCH 30.8 27.0 - 31.0 pg    MCHC 31.6 (L) 32.0 - 36.0 g/dL    RDW 15.9 (H) 11.5 - 14.5 %    Platelets 212 150 - 450 K/uL    MPV 10.4 9.2 - 12.9 fL    Immature Granulocytes 0.9 (H) 0.0 - 0.5 %    Gran # (ANC) 5.5 1.8 - 7.7 K/uL    Immature Grans (Abs) 0.07 (H) 0.00 - 0.04 K/uL    Lymph # 1.4 1.0 - 4.8 K/uL    Mono # 0.6 0.3 - 1.0 K/uL    Eos # 0.2 0.0 - 0.5 K/uL    Baso # 0.00 0.00 - 0.20 K/uL    nRBC 0 0 /100 WBC    Gran % 71.1 38.0 - 73.0 %    Lymph % 18.3 18.0 - 48.0 %    Mono % 7.6 4.0 - 15.0 %    Eosinophil % 2.1 0.0 - 8.0 %    Basophil % 0.0 0.0 - 1.9 %    Differential Method Automated    Comprehensive metabolic panel   Result Value Ref Range    Sodium 140 136 - 145 mmol/L    Potassium 4.7 3.5 - 5.1 mmol/L    Chloride 104 95 - 110 mmol/L    CO2 24 23 - 29 mmol/L    Glucose 108 70 - 110 mg/dL    BUN 19 8 - 23 mg/dL    Creatinine 0.9 0.5 - 1.4 mg/dL    Calcium 9.5 8.7 - 10.5 mg/dL    Total Protein 6.8 6.0 - 8.4 g/dL    Albumin 3.5 3.5 - 5.2 g/dL    Total Bilirubin 0.5 0.1 - 1.0 mg/dL    Alkaline Phosphatase 104 55 - 135  U/L    AST 20 10 - 40 U/L    ALT 15 10 - 44 U/L    Anion Gap 12 8 - 16 mmol/L    eGFR >60 >60 mL/min/1.73 m^2   Urinalysis, Reflex to Urine Culture Urine, Clean Catch    Specimen: Urine   Result Value Ref Range    Specimen UA Urine, Catheterized     Color, UA Yellow Yellow, Straw, Lia    Appearance, UA Clear Clear    pH, UA 7.0 5.0 - 8.0    Specific Gravity, UA 1.010 1.005 - 1.030    Protein, UA Negative Negative    Glucose, UA Negative Negative    Ketones, UA Negative Negative    Bilirubin (UA) Negative Negative    Occult Blood UA Negative Negative    Nitrite, UA Negative Negative    Urobilinogen, UA Negative <2.0 EU/dL    Leukocytes, UA Negative Negative     Imaging Results:  Imaging Results          X-Ray Chest 1 View (Final result)  Result time 08/17/22 15:15:31    Final result by Demar Cross MD (08/17/22 15:15:31)                 Impression:      1.  Low lung volumes with vascular crowding or atelectasis throughout the lungs.  A function of mild interstitial pulmonary edema or interstitial infectious process difficult to exclude in the right clinical setting.    2.  Stable findings as noted above.      Electronically signed by: Demar Cross MD  Date:    08/17/2022  Time:    15:15             Narrative:    EXAMINATION:  XR CHEST 1 VIEW    CLINICAL HISTORY:  Altered mental status, unspecified    COMPARISON:  July 19, 2022    FINDINGS:  Low lung volumes with vascular crowding or atelectasis throughout the lungs.  Snaps overlie the left lung.  Soft tissues of the patient's right arm overlies the right lower lung laterally.  The lungs are otherwise clear.  The cardiac silhouette size is borderline enlarged.  The trachea is midline and the mediastinal width is normal. Negative for focal infiltrate, effusion or pneumothorax. Pulmonary vasculature is normal. Negative for osseous abnormalities. Ectatic and tortuous aorta with aortic arch calcifications.  Degenerative changes of the spine and both shoulder  ananda.                                        The Emergency Provider reviewed the vital signs and test results, which are outlined above.    ED Discussion     3:46 PM: Reassessed pt at this time. Discussed with pt's fmaily all pertinent ED information and results. Discussed pt dx and plan of tx. Gave family all f/u and return to the ED instructions. All questions and concerns were addressed at this time. Family expresses understanding of information and instructions, and is comfortable with plan to discharge. Pt is stable for discharge.    I discussed with patient and/or family/caretaker that evaluation in the ED does not suggest any emergent or life threatening medical conditions requiring immediate intervention beyond what was provided in the ED, and I believe patient is safe for discharge.  Regardless, an unremarkable evaluation in the ED does not preclude the development or presence of a serious of life threatening condition. As such, patient was instructed to return immediately for any worsening or change in current symptoms.         ED Medication(s):  Medications - No data to display     Follow-up Information     Maude Barrios MD.    Specialty: Family Medicine  Contact information:  [facility]  [place] [address]  [phone]             [facility] - Emergency Dept..    Specialty: Emergency Medicine  Why: If symptoms worsen  Contact information:  [id number] [place] [address] [phone]                      New Prescriptions    No medications on file         Medical Decision Making    Medical Decision Making:   Clinical Tests:   Lab Tests: Ordered and Reviewed  Radiological Study: Ordered and Reviewed           Scribe Attestation:   Scribe #1: I performed the above scribed service and the documentation accurately describes the services I performed. I attest to the accuracy of the note.    Attending:   Physician Attestation Statement for Scribe #1: Rudy GARLAND  Maryana Ji MD, personally performed the services described in this documentation, as scribed by Miguel Raymundo, in my presence, and it is both accurate and complete.          Clinical Impression       ICD-10-CM ICD-9-CM   1. Confusion  R41.0 298.9   2. Altered mental status  R41.82 780.97   3. Dementia without behavioral disturbance, unspecified dementia type  F03.90 294.20       Disposition:   Disposition: Discharged  Condition: Stable         Rudy Mijares Jr., MD  08/17/22 5639

## 2022-08-17 NOTE — PROGRESS NOTES
Subjective:      Patient ID: Rosy Balderrama is a 89 y.o. female.    Chief Complaint: Post-op Evaluation and Pain of the Left Femur      HPI: Rosy Balderrama Is a 89-year-old female in clinic today for postoperative follow-up.  Patient is 1 month status post operative fixation of left periprosthetic femur fracture.  Patient has been compliant with nonweightbearing status.  Sutures were removed at the nursing home she is currently staying at.  Patient has no new complaints at this    Past Medical History:   Diagnosis Date    *Atrial fibrillation     Allergy     Blood transfusion     Cataract     Diabetes mellitus type II     Hyperlipidemia     Osteoporosis        Current Outpatient Medications:     acetaminophen (TYLENOL) 500 MG tablet, Take 1 tablet (500 mg total) by mouth every 6 (six) hours as needed for Pain., Disp: 90 tablet, Rfl: 0    AMINO ACIDS/MINERALS (A/G PRO ORAL), Take 2 tablets by mouth 2 (two) times a day., Disp: , Rfl:     ANTIOX#10/OM3/DHA/EPA/LUT/ZEAX (I-CAPS ORAL), Take 1 capsule by mouth once daily., Disp: , Rfl:     aspirin 81 MG Chew, Take 81 mg by mouth nightly., Disp: , Rfl:     bumetanide (BUMEX) 1 MG tablet, Take 1 mg by mouth once daily., Disp: , Rfl:     CALCIUM CARBONATE/VITAMIN D3 (CALTRATE 600 + D ORAL), Take 1 tablet by mouth once daily., Disp: , Rfl:     cranberry fruit concentrate (AZO CRANBERRY ORAL), Take 1 tablet by mouth 2 (two) times a day., Disp: , Rfl:     docusate sodium (COLACE) 100 MG capsule, Take 1 capsule (100 mg total) by mouth 2 (two) times daily., Disp: 60 capsule, Rfl: 2    fexofenadine (ALLEGRA) 180 MG tablet, Take 180 mg by mouth once daily., Disp: , Rfl:     memantine (NAMENDA) 10 MG Tab, Take 10 mg by mouth 2 (two) times daily., Disp: , Rfl:     metoprolol tartrate (LOPRESSOR) 25 MG tablet, Take 12.5 mg by mouth 2 (two) times daily., Disp: , Rfl:     MULTIVITAMIN ORAL, Take 1 tablet by mouth once daily., Disp: , Rfl:     omega-3 fatty acids/fish  [Annual Visit] : annual visit oil (FISH OIL-OMEGA-3 FATTY ACIDS) 300-1,000 mg capsule, Take 1 capsule by mouth once daily., Disp: , Rfl:     pravastatin (PRAVACHOL) 40 MG tablet, Take 40 mg by mouth once daily., Disp: , Rfl:     QUEtiapine (SEROQUEL) 25 MG Tab, Take 25 mg by mouth nightly., Disp: , Rfl:     raloxifene (EVISTA) 60 mg tablet, Take 60 mg by mouth once daily., Disp: , Rfl:     sitagliptan (JANUVIA) 100 MG Tab, Take 100 mg by mouth once daily., Disp: , Rfl:     traMADoL (ULTRAM) 50 mg tablet, Take 50 mg by mouth every 8 (eight) hours as needed for Pain., Disp: , Rfl:     vitamin D (VITAMIN D3) 1000 units Tab, Take 1,000 Units by mouth once daily., Disp: , Rfl:     naproxen sodium (ANAPROX) 220 MG tablet, Take 220 mg by mouth 2 (two) times daily with meals., Disp: , Rfl:   Review of patient's allergies indicates:   Allergen Reactions    Alendronate Other (See Comments)    Atorvastatin Other (See Comments)    Ciprofloxacin Other (See Comments)    Codeine Itching    Diazepam Itching    Ibuprofen Other (See Comments)    Oxycodone-acetaminophen Other (See Comments) and Itching    Rofecoxib Other (See Comments)    Seldane     Simvastatin Other (See Comments)       BP (!) 118/58 (BP Location: Right arm, Patient Position: Sitting, BP Method: Medium (Automatic))   Ht 5' (1.524 m)   Wt 75.7 kg (166 lb 14.2 oz)   BMI 32.59 kg/m²     ROS      Objective:    Ortho Exam   Left lower extremity:  Incision is well healed, no signs of infection   No edema   No TTP  Hip and knee ROM normal   Calf and compartments are soft and compressible  Motor exam normal   Sensation and pulses intact     GEN: Well developed, well nourished female. AAOX3. No acute distress.   Head: Normocephalic, atraumatic.   Eyes: MARY  Neck: Trachea is midline, no adenopathy  Resp: Breathing unlabored.  Neuro: Motor function normal, Cranial nerves intact  Psych: Mood and affect appropriate.       Assessment:     Imaging:  X-ray left femur obtained today shows  hardware in satisfactory alignment with no signs of failure.  There is still a clear fracture line noted        1. Periprosthetic fracture of femur following total replacement of hip, subsequent encounter          Plan:       Reviewed the radiographs with the patient and her daughter.  Recommended we continue with toe-touch weight-bearing for another 4 weeks and then have the patient follow up for repeat radiographs and further evaluation.  Patient may want to start a supplement of vitamin-D for bone health       Follow up in about 1 month (around 9/17/2022).          Patient note was created using MModal Dictation.  Any errors in syntax or even information may not have been identified and edited on initial review prior to signing this note.

## 2022-09-21 ENCOUNTER — HOSPITAL ENCOUNTER (OUTPATIENT)
Dept: RADIOLOGY | Facility: HOSPITAL | Age: 87
Discharge: HOME | End: 2022-09-21
Attending: PHYSICIAN ASSISTANT
Payer: MEDICARE

## 2022-09-21 ENCOUNTER — OFFICE VISIT (OUTPATIENT)
Dept: ORTHOPEDICS | Facility: CLINIC | Age: 87
End: 2022-09-21
Payer: MEDICARE

## 2022-09-21 VITALS
BODY MASS INDEX: 32.59 KG/M2 | SYSTOLIC BLOOD PRESSURE: 139 MMHG | DIASTOLIC BLOOD PRESSURE: 78 MMHG | WEIGHT: 166 LBS | HEART RATE: 77 BPM | HEIGHT: 60 IN

## 2022-09-21 DIAGNOSIS — M89.8X5 PAIN OF LEFT FEMUR: Primary | ICD-10-CM

## 2022-09-21 DIAGNOSIS — M89.8X5 PAIN OF LEFT FEMUR: ICD-10-CM

## 2022-09-21 DIAGNOSIS — Z96.649 PERIPROSTHETIC FRACTURE OF FEMUR FOLLOWING TOTAL REPLACEMENT OF HIP, SUBSEQUENT ENCOUNTER: Primary | ICD-10-CM

## 2022-09-21 DIAGNOSIS — M97.8XXD PERIPROSTHETIC FRACTURE OF FEMUR FOLLOWING TOTAL REPLACEMENT OF HIP, SUBSEQUENT ENCOUNTER: Primary | ICD-10-CM

## 2022-09-21 DIAGNOSIS — M80.00XD AGE-RELATED OSTEOPOROSIS WITH CURRENT PATHOLOGICAL FRACTURE WITH ROUTINE HEALING, SUBSEQUENT ENCOUNTER: ICD-10-CM

## 2022-09-21 DIAGNOSIS — R10.2 PAIN IN PELVIS: ICD-10-CM

## 2022-09-21 PROCEDURE — 99999 PR PBB SHADOW E&M-EST. PATIENT-LVL IV: ICD-10-PCS | Mod: PBBFAC,,, | Performed by: ORTHOPAEDIC SURGERY

## 2022-09-21 PROCEDURE — 72170 X-RAY EXAM OF PELVIS: CPT | Mod: TC

## 2022-09-21 PROCEDURE — 99024 POSTOP FOLLOW-UP VISIT: CPT | Mod: POP,,, | Performed by: ORTHOPAEDIC SURGERY

## 2022-09-21 PROCEDURE — 99999 PR PBB SHADOW E&M-EST. PATIENT-LVL IV: CPT | Mod: PBBFAC,,, | Performed by: ORTHOPAEDIC SURGERY

## 2022-09-21 PROCEDURE — 99024 PR POST-OP FOLLOW-UP VISIT: ICD-10-PCS | Mod: POP,,, | Performed by: ORTHOPAEDIC SURGERY

## 2022-09-21 PROCEDURE — 73552 X-RAY EXAM OF FEMUR 2/>: CPT | Mod: TC,LT

## 2022-09-21 PROCEDURE — 99214 OFFICE O/P EST MOD 30 MIN: CPT | Mod: PBBFAC | Performed by: ORTHOPAEDIC SURGERY

## 2022-09-22 ENCOUNTER — TELEPHONE (OUTPATIENT)
Dept: ORTHOPEDICS | Facility: CLINIC | Age: 87
End: 2022-09-22
Payer: MEDICARE

## 2022-09-22 NOTE — TELEPHONE ENCOUNTER
----- Message from Stacie Dc MA sent at 9/21/2022  5:21 PM CDT -----  Contact: [facility]    ----- Message -----  From: Nikki Shepherd  Sent: 9/21/2022   2:48 PM CDT  To: [facility] Staff    Please fax the pt clinical notes to [phone], no additional info given and can be reached at [phone]///thxW

## 2022-09-22 NOTE — TELEPHONE ENCOUNTER
----- Message from Alida Matos sent at 9/22/2022  1:07 PM CDT -----  Contact: Brianna/[facility]  Brianna with [facility] is calling to speak with a nurse regarding mutual patient. Request to be informed of patient's weight bearing status. Please give Ms. Reed a call back at [phone] when possible.  Thank you,  [facility]

## 2022-09-22 NOTE — TELEPHONE ENCOUNTER
Spoke with Jaqui/ [facility] and informed her that the patient is weightbearing as tolerated per Dr Marx. Understanding verbalized.

## 2022-09-22 NOTE — TELEPHONE ENCOUNTER
Spoke with [facility] and informed them that the notes would be faxed once completed. Understanding verbalized.

## 2022-09-25 NOTE — PROGRESS NOTES
Patient ID: Rosy Balderrama is a 89 y.o. female.    Chief Complaint: Pain and Post-op Evaluation of the Left Femur      HPI: Rosy Balderrama is a very pleasant 89 y.o.  female who is here for follow-up of left femur.  She is 8 weeks status post open reduction internal fixation of her left [place] B1 periprosthetic femur fracture performed by me on July 19, 2022. The patient was last seen by jeffrey Carrino on August 17, 2022.  Since then patient has been in an inpatient facility and has been nonweightbearing to the left lower extremity.  She is here today with a caregiver.  Overall she is doing very well and denies any significant pain.  Her pain today as a 2/10.    Past Medical History:   Diagnosis Date    *Atrial fibrillation     Allergy     Blood transfusion     Cataract     Diabetes mellitus type II     Hyperlipidemia     Osteoporosis      Past Surgical History:   Procedure Laterality Date    ANKLE SURGERY      broken ankle    BILATERAL SALPINGOOPHORECTOMY      BREAST BIOPSY      CARPAL TUNNEL RELEASE      CHOLECYSTECTOMY      COLONOSCOPY      ERCP      ESOPHAGOGASTRODUODENOSCOPY      FRACTURE SURGERY      KNEE CARTILAGE SURGERY      ORIF FEMUR FRACTURE Left 7/19/2022    Procedure: ORIF, FRACTURE, FEMUR;  Surgeon: Yulissa Marx MD;  Location: [facility];  Service: Orthopedics;  Laterality: Left;  Periprosthetic femur fracture    TOTAL ABDOMINAL HYSTERECTOMY      TOTAL HIP ARTHROPLASTY      TUBAL LIGATION       Family History   Problem Relation Age of Onset    Cancer Mother     Cancer Sister     Diabetes Brother      Social History     Socioeconomic History    Marital status:    Occupational History    Occupation: Retired/Housewife     Comment: Housewife   Tobacco Use    Smoking status: Never    Smokeless tobacco: Never   Substance and Sexual Activity    Alcohol use: No    Drug use: No   Social History Narrative    Patient is a housewife.     Medication List with Changes/Refills   Current  Medications    ACETAMINOPHEN (TYLENOL) 500 MG TABLET    Take 1 tablet (500 mg total) by mouth every 6 (six) hours as needed for Pain.    AMINO ACIDS/MINERALS (A/G PRO ORAL)    Take 2 tablets by mouth 2 (two) times a day.    ANTIOX#10/OM3/DHA/EPA/LUT/ZEAX (I-CAPS ORAL)    Take 1 capsule by mouth once daily.    ASPIRIN 81 MG CHEW    Take 81 mg by mouth nightly.    BUMETANIDE (BUMEX) 1 MG TABLET    Take 1 mg by mouth once daily.    CALCIUM CARBONATE/VITAMIN D3 (CALTRATE 600 + D ORAL)    Take 1 tablet by mouth once daily.    CRANBERRY FRUIT CONCENTRATE (AZO CRANBERRY ORAL)    Take 1 tablet by mouth 2 (two) times a day.    DOCUSATE SODIUM (COLACE) 100 MG CAPSULE    Take 1 capsule (100 mg total) by mouth 2 (two) times daily.    FEXOFENADINE (ALLEGRA) 180 MG TABLET    Take 180 mg by mouth once daily.    MEMANTINE (NAMENDA) 10 MG TAB    Take 10 mg by mouth 2 (two) times daily.    METOPROLOL TARTRATE (LOPRESSOR) 25 MG TABLET    Take 12.5 mg by mouth 2 (two) times daily.    MULTIVITAMIN ORAL    Take 1 tablet by mouth once daily.    NAPROXEN SODIUM (ANAPROX) 220 MG TABLET    Take 220 mg by mouth 2 (two) times daily with meals.    OMEGA-3 FATTY ACIDS/FISH OIL (FISH OIL-OMEGA-3 FATTY ACIDS) 300-1,000 MG CAPSULE    Take 1 capsule by mouth once daily.    PRAVASTATIN (PRAVACHOL) 40 MG TABLET    Take 40 mg by mouth once daily.    QUETIAPINE (SEROQUEL) 25 MG TAB    Take 25 mg by mouth nightly.    RALOXIFENE (EVISTA) 60 MG TABLET    Take 60 mg by mouth once daily.    SITAGLIPTAN (JANUVIA) 100 MG TAB    Take 100 mg by mouth once daily.    TRAMADOL (ULTRAM) 50 MG TABLET    Take 50 mg by mouth every 8 (eight) hours as needed for Pain.    VITAMIN D (VITAMIN D3) 1000 UNITS TAB    Take 1,000 Units by mouth once daily.     Review of patient's allergies indicates:   Allergen Reactions    Alendronate Other (See Comments)    Atorvastatin Other (See Comments)    Ciprofloxacin Other (See Comments)    Codeine Itching    Diazepam Itching     Ibuprofen Other (See Comments)    Oxycodone-acetaminophen Other (See Comments) and Itching    Rofecoxib Other (See Comments)    Seldane     Simvastatin Other (See Comments)       Physical Exam:     Wt Readings from Last 3 Encounters:   09/21/22 75.3 kg (166 lb)   08/17/22 75.7 kg (166 lb 14.2 oz)   07/27/22 75.7 kg (166 lb 14.2 oz)     Temp Readings from Last 3 Encounters:   08/17/22 98.9 °F (37.2 °C) (Oral)   07/27/22 98.9 °F (37.2 °C) (Oral)   06/19/21 99 °F (37.2 °C) (Oral)     BP Readings from Last 3 Encounters:   09/21/22 139/78   08/17/22 97/70   08/17/22 (!) 118/58     Pulse Readings from Last 3 Encounters:   09/21/22 77   08/17/22 72   07/27/22 70       Body mass index is 32.42 kg/m².      General Appearance:   cooperative, no acute distress, appropriate for age                    Neurologic:  Alert and oriented x3                            Pysch:  Appropriate mood and affect                              Head:  Normocephalic, atraumatic                             EENT:  EOM grossly intact, no icterus                   Respiratory:  non-labored; no audible wheezing                      Abdomen:  non-distended           Musculoskeletal:  Examined in a wheelchair, left lower extremity:  Skin is warm dry and intact.  No erythema induration is noted.  Scars well healed.  Patient does have an area near the knee where a superficial stitch is visible.  Again no drainage or erythema is noted around stitch.  Patient has no pain with range of motion of the knee.  No groin pain with range of motion of the hip.               Skin/Hair/Nails:  Skin warm, dry, and intact, no rashes or abnormal dyspigmentation     IMAGING:  Imaging studies done today including AP and lateral of the left femur demonstrate no change in total hip positioning or in hardware positioning along the femur.  Good bony healing is noted from the fracture.    Assessment:       Encounter Diagnoses   Name Primary?    Periprosthetic fracture of femur  following total replacement of hip, subsequent encounter Yes    Age-related osteoporosis with current pathological fracture with routine healing, subsequent encounter           Plan:       Rosy was seen today for pain and post-op evaluation.    Diagnoses and all orders for this visit:    Periprosthetic fracture of femur following total replacement of hip, subsequent encounter    Age-related osteoporosis with current pathological fracture with routine healing, subsequent encounter    Overall the patient is doing exceptionally well.  She may begin increasing her physical therapy and may be weight-bearing as tolerated to the left lower extremity.  I also removed the exposed stitch distally after cleaning it with Betadine.  We will see her back in clinic in 4 weeks for recheck with x-rays.    Follow up in about 4 weeks (around 10/19/2022) for AP and lateral x-rays of the left femur.    The patient understands, chooses and consents to this plan and accepts all   the risks which include but are not limited to the risks mentioned above.     Disclaimer: This note was prepared using a voice recognition system and is likely to have sound alike errors within the text.         Yulissa Marx MD, ALICIA, FAAOS  Orthopaedic Surgeon

## 2022-10-24 ENCOUNTER — TELEPHONE (OUTPATIENT)
Dept: ORTHOPEDICS | Facility: CLINIC | Age: 87
End: 2022-10-24

## 2022-10-24 ENCOUNTER — HOSPITAL ENCOUNTER (OUTPATIENT)
Dept: RADIOLOGY | Facility: HOSPITAL | Age: 87
Discharge: HOME | End: 2022-10-24
Attending: ORTHOPAEDIC SURGERY
Payer: MEDICARE

## 2022-10-24 ENCOUNTER — OFFICE VISIT (OUTPATIENT)
Dept: ORTHOPEDICS | Facility: CLINIC | Age: 87
End: 2022-10-24
Payer: MEDICARE

## 2022-10-24 VITALS
BODY MASS INDEX: 32.59 KG/M2 | DIASTOLIC BLOOD PRESSURE: 53 MMHG | SYSTOLIC BLOOD PRESSURE: 91 MMHG | WEIGHT: 166 LBS | HEIGHT: 60 IN | HEART RATE: 56 BPM

## 2022-10-24 DIAGNOSIS — Z96.649 PERIPROSTHETIC FRACTURE OF FEMUR FOLLOWING TOTAL REPLACEMENT OF HIP, SUBSEQUENT ENCOUNTER: Primary | ICD-10-CM

## 2022-10-24 DIAGNOSIS — M97.8XXD PERIPROSTHETIC FRACTURE OF FEMUR FOLLOWING TOTAL REPLACEMENT OF HIP, SUBSEQUENT ENCOUNTER: Primary | ICD-10-CM

## 2022-10-24 DIAGNOSIS — M25.559 HIP PAIN: Primary | ICD-10-CM

## 2022-10-24 DIAGNOSIS — R10.2 PAIN IN PELVIS: ICD-10-CM

## 2022-10-24 DIAGNOSIS — M89.8X5 PAIN OF LEFT FEMUR: Primary | ICD-10-CM

## 2022-10-24 DIAGNOSIS — M89.8X5 PAIN OF LEFT FEMUR: ICD-10-CM

## 2022-10-24 DIAGNOSIS — M80.00XD AGE-RELATED OSTEOPOROSIS WITH CURRENT PATHOLOGICAL FRACTURE WITH ROUTINE HEALING, SUBSEQUENT ENCOUNTER: ICD-10-CM

## 2022-10-24 PROCEDURE — 99999 PR PBB SHADOW E&M-EST. PATIENT-LVL V: CPT | Mod: PBBFAC,,, | Performed by: PHYSICIAN ASSISTANT

## 2022-10-24 PROCEDURE — 99215 OFFICE O/P EST HI 40 MIN: CPT | Mod: PBBFAC | Performed by: PHYSICIAN ASSISTANT

## 2022-10-24 PROCEDURE — 72170 X-RAY EXAM OF PELVIS: CPT | Mod: TC

## 2022-10-24 PROCEDURE — 99214 PR OFFICE/OUTPT VISIT, EST, LEVL IV, 30-39 MIN: ICD-10-PCS | Mod: S$PBB,,, | Performed by: PHYSICIAN ASSISTANT

## 2022-10-24 PROCEDURE — 73552 X-RAY EXAM OF FEMUR 2/>: CPT | Mod: TC,LT

## 2022-10-24 PROCEDURE — 99214 OFFICE O/P EST MOD 30 MIN: CPT | Mod: S$PBB,,, | Performed by: PHYSICIAN ASSISTANT

## 2022-10-24 PROCEDURE — 99999 PR PBB SHADOW E&M-EST. PATIENT-LVL V: ICD-10-PCS | Mod: PBBFAC,,, | Performed by: PHYSICIAN ASSISTANT

## 2022-10-24 RX ORDER — ESCITALOPRAM OXALATE 10 MG/1
10 TABLET ORAL DAILY
COMMUNITY

## 2022-10-24 NOTE — TELEPHONE ENCOUNTER
Spoke with the [facility] -- office notes - xray reports and appts faxed to [phone]    Verbalized understanding and thankful for call

## 2022-10-24 NOTE — TELEPHONE ENCOUNTER
----- Message from Craig Betancur sent at 10/24/2022  3:26 PM CDT -----  [facility]  [facility] would like to consult with a nurse in regards to the patient. She stated she would like to discuss the patient after visit summary and also orders pertaining to that patient. Please call back at [phone]. Thanks lynn

## 2022-10-24 NOTE — PROGRESS NOTES
Subjective:      Patient ID: Rosy Balderrama is a 89 y.o. female.    Chief Complaint: Post-op Evaluation and Pain of the Left Leg      HPI: Rosy Balderrama is a 89-year-old female in clinic today for postoperative follow-up. Patient is 3 months status post operative fixation of left periprosthetic femur fracture. Patient reports increased leg pain for about 3 weeks now. She has been made non-weight bearing. Ultrasound was performed at the [facility] and DVT was confirmed. Eliquis dose was increased to 10mg BID for a week.    Past Medical History:   Diagnosis Date    *Atrial fibrillation     Allergy     Blood transfusion     Cataract     Diabetes mellitus type II     Hyperlipidemia     Osteoporosis        Current Outpatient Medications:     acetaminophen (TYLENOL) 500 MG tablet, Take 1 tablet (500 mg total) by mouth every 6 (six) hours as needed for Pain., Disp: 90 tablet, Rfl: 0    AMINO ACIDS/MINERALS (A/G PRO ORAL), Take 2 tablets by mouth 2 (two) times a day., Disp: , Rfl:     ANTIOX#10/OM3/DHA/EPA/LUT/ZEAX (I-CAPS ORAL), Take 1 capsule by mouth once daily., Disp: , Rfl:     apixaban (ELIQUIS) 5 mg Tab, Take 5 mg by mouth 2 (two) times daily., Disp: , Rfl:     apixaban (ELIQUIS) 5 mg Tab, Take 10 mg by mouth 2 (two) times daily., Disp: , Rfl:     aspirin 81 MG Chew, Take 81 mg by mouth nightly., Disp: , Rfl:     bumetanide (BUMEX) 1 MG tablet, Take 1 mg by mouth once daily., Disp: , Rfl:     CALCIUM CARBONATE/VITAMIN D3 (CALTRATE 600 + D ORAL), Take 1 tablet by mouth once daily., Disp: , Rfl:     cranberry fruit concentrate (AZO CRANBERRY ORAL), Take 1 tablet by mouth 2 (two) times a day., Disp: , Rfl:     docusate sodium (COLACE) 100 MG capsule, Take 1 capsule (100 mg total) by mouth 2 (two) times daily., Disp: 60 capsule, Rfl: 2    EScitalopram oxalate (LEXAPRO) 10 MG tablet, Take 10 mg by mouth once daily., Disp: , Rfl:     fexofenadine (ALLEGRA) 180 MG tablet, Take 180 mg by mouth once daily., Disp: , Rfl:     memantine  (NAMENDA) 10 MG Tab, Take 10 mg by mouth 2 (two) times daily., Disp: , Rfl:     metoprolol tartrate (LOPRESSOR) 25 MG tablet, Take 12.5 mg by mouth 2 (two) times daily., Disp: , Rfl:     MULTIVITAMIN ORAL, Take 1 tablet by mouth once daily., Disp: , Rfl:     naproxen sodium (ANAPROX) 220 MG tablet, Take 220 mg by mouth 2 (two) times daily with meals., Disp: , Rfl:     omega-3 fatty acids/fish oil (FISH OIL-OMEGA-3 FATTY ACIDS) 300-1,000 mg capsule, Take 1 capsule by mouth once daily., Disp: , Rfl:     pravastatin (PRAVACHOL) 40 MG tablet, Take 40 mg by mouth once daily., Disp: , Rfl:     QUEtiapine (SEROQUEL) 25 MG Tab, Take 25 mg by mouth nightly., Disp: , Rfl:     raloxifene (EVISTA) 60 mg tablet, Take 60 mg by mouth once daily., Disp: , Rfl:     sitagliptan (JANUVIA) 100 MG Tab, Take 100 mg by mouth once daily., Disp: , Rfl:     traMADoL (ULTRAM) 50 mg tablet, Take 50 mg by mouth every 8 (eight) hours as needed for Pain., Disp: , Rfl:     vitamin D (VITAMIN D3) 1000 units Tab, Take 1,000 Units by mouth once daily., Disp: , Rfl:   Review of patient's allergies indicates:   Allergen Reactions    Alendronate Other (See Comments)    Atorvastatin Other (See Comments)    Ciprofloxacin Other (See Comments)    Codeine Itching    Diazepam Itching    Ibuprofen Other (See Comments)    Oxycodone-acetaminophen Other (See Comments) and Itching    Rofecoxib Other (See Comments)    Seldane     Simvastatin Other (See Comments)       BP (!) 91/53   Pulse (!) 56   Ht 5' (1.524 m)   Wt 75.3 kg (166 lb)   BMI 32.42 kg/m²     ROS      Objective:    Ortho Exam   Left lower extremity:  Incision is well healed, no signs of infection  Left leg is more swollen than the right  No TTP  No pain with internal/external rotation  Calf and compartments are soft and compressible    GEN: Well developed, well nourished female. AAOX3. No acute distress.   Head: Normocephalic, atraumatic.   Eyes: MARY  Neck: Trachea is midline, no  adenopathy  Resp: Breathing unlabored.  Neuro: Motor function normal, Cranial nerves intact  Psych: Mood and affect appropriate.       Assessment:     Imaging: Xray left femur obtained today shows lateral bowing when compared to previous imaging from 9/21/2022. There appears to be delayed healing at the fracture site        1. Periprosthetic fracture of femur following total replacement of hip, subsequent encounter    2. Age-related osteoporosis with current pathological fracture with routine healing, subsequent encounter          Plan:       Discussed this case with Dr. Marx who agrees with the following plan. Reviewed the radiographs with the patient's son and physical therapist after our visit. Recommended patient be made non-weight bearing for 2 weeks and then return for repeat radiographs. No further intervention is indicated at this time. DVT prophylaxis per SNF medical team    Orders Placed This Encounter    Ambulatory referral/consult to Home Health     Follow up in about 2 weeks (around 11/7/2022).          Patient note was created using MModal Dictation.  Any errors in syntax or even information may not have been identified and edited on initial review prior to signing this note.

## 2022-11-05 PROCEDURE — G0180 MD CERTIFICATION HHA PATIENT: HCPCS | Mod: ,,, | Performed by: PHYSICIAN ASSISTANT

## 2022-11-05 PROCEDURE — G0180 PR HOME HEALTH MD CERTIFICATION: ICD-10-PCS | Mod: ,,, | Performed by: PHYSICIAN ASSISTANT

## 2022-11-09 NOTE — TELEPHONE ENCOUNTER
----- Message from Lucio Valerio sent at 11/9/2022  9:43 AM CST -----  Contact: Chiquis  Type:  RX Refill Request    Who Called: Chiquis   Refill or New Rx:refill   RX Name and Strength:apixaban (ELIQUIS) 5 mg Tab  How is the patient currently taking it? (ex. 1XDay):2xDay   Is this a 30 day or 90 day RX:  [facility] [address] [facility] [place] [address]  Phone :[phone]  Local or Mail Order:Local   Ordering Provider:Frankie   Would the patient rather a call back or a response via [facility]? Call back   Best Call Back Number:[phone]  Additional Information:

## 2022-11-10 NOTE — TELEPHONE ENCOUNTER
Spoke with Carter/ [facility] and informed him the Orthopedics would not manage the patient's Eliquis after a DVT and that we do not prescribe Lexapro. Patient should follow up with her PCP or prescribing physician. Understanding verbalized.        HOME HEALTH RX REFILL  Received: Today  Carter David PA-C; [facility] Staff  Phone Number: [phone]    Good morning,     Patients daughter called requesting refills on Eliquis and Lexapro as she is completely out. Recent d/c from the [facility] and unable to get further refills.       Requested Rx be sent to [facility] in [place] for urgent refill.     Thank you,     Carter Thompson LPN   PCMA   [facility]

## 2022-11-14 ENCOUNTER — TELEPHONE (OUTPATIENT)
Dept: ORTHOPEDICS | Facility: CLINIC | Age: 87
End: 2022-11-14
Payer: MEDICARE

## 2022-11-14 DIAGNOSIS — R10.2 PAIN IN PELVIS: Primary | ICD-10-CM

## 2022-11-14 NOTE — TELEPHONE ENCOUNTER
Spoke with patient's daughter and rescheduled her appointment. Understanding verbalized of new appointment date, time and location.  Will reach out to her PCP to assist with needed documentation for patient's medications since discharge from [facility]. Understanding verbalized.

## 2022-11-14 NOTE — TELEPHONE ENCOUNTER
----- Message from Elaine Araujo sent at 11/14/2022  3:33 PM CST -----  Contact: Chiquis  Marion called to see when does patient needs to come in for follow up. Please call her back at [phone].        Thanks  DD

## 2022-11-15 ENCOUNTER — TELEPHONE (OUTPATIENT)
Dept: ORTHOPEDICS | Facility: CLINIC | Age: 87
End: 2022-11-15
Payer: MEDICARE

## 2022-11-15 NOTE — TELEPHONE ENCOUNTER
Spoke with patient's daughter Chiquis and informed her that Dr Maude Zhang's office sent in a 30 day prescription for Eliquis and that they were also doing a Vascular referral for her. Understanding verbalized.

## 2022-11-17 ENCOUNTER — LAB VISIT (OUTPATIENT)
Dept: LAB | Facility: HOSPITAL | Age: 87
End: 2022-11-17
Attending: FAMILY MEDICINE
Payer: MEDICARE

## 2022-11-17 ENCOUNTER — PES CALL (OUTPATIENT)
Dept: ADMINISTRATIVE | Facility: CLINIC | Age: 87
End: 2022-11-17
Payer: MEDICARE

## 2022-11-17 DIAGNOSIS — Z13.29 SCREENING FOR THYROID DISORDER: ICD-10-CM

## 2022-11-17 DIAGNOSIS — E11.9 DIABETES MELLITUS WITHOUT COMPLICATION: Primary | ICD-10-CM

## 2022-11-17 DIAGNOSIS — I11.0 HYPERTENSIVE HEART DISEASE WITH CONGESTIVE HEART FAILURE: ICD-10-CM

## 2022-11-17 LAB
BASOPHILS # BLD AUTO: 0 K/UL (ref 0–0.2)
BASOPHILS NFR BLD: 0 % (ref 0–1.9)
CHOLEST SERPL-MCNC: 133 MG/DL (ref 120–199)
CHOLEST/HDLC SERPL: 4.3 {RATIO} (ref 2–5)
DIFFERENTIAL METHOD: ABNORMAL
EOSINOPHIL # BLD AUTO: 0.3 K/UL (ref 0–0.5)
EOSINOPHIL NFR BLD: 5.5 % (ref 0–8)
ERYTHROCYTE [DISTWIDTH] IN BLOOD BY AUTOMATED COUNT: 16 % (ref 11.5–14.5)
ESTIMATED AVG GLUCOSE: 123 MG/DL (ref 68–131)
HBA1C MFR BLD: 5.9 % (ref 4–5.6)
HCT VFR BLD AUTO: 26.2 % (ref 37–48.5)
HDLC SERPL-MCNC: 31 MG/DL (ref 40–75)
HDLC SERPL: 23.3 % (ref 20–50)
HGB BLD-MCNC: 8.4 G/DL (ref 12–16)
IMM GRANULOCYTES # BLD AUTO: 0.01 K/UL (ref 0–0.04)
IMM GRANULOCYTES NFR BLD AUTO: 0.2 % (ref 0–0.5)
LDLC SERPL CALC-MCNC: 78.4 MG/DL (ref 63–159)
LYMPHOCYTES # BLD AUTO: 1.3 K/UL (ref 1–4.8)
LYMPHOCYTES NFR BLD: 26.8 % (ref 18–48)
MCH RBC QN AUTO: 35.1 PG (ref 27–31)
MCHC RBC AUTO-ENTMCNC: 32.1 G/DL (ref 32–36)
MCV RBC AUTO: 110 FL (ref 82–98)
MONOCYTES # BLD AUTO: 0.4 K/UL (ref 0.3–1)
MONOCYTES NFR BLD: 8.3 % (ref 4–15)
NEUTROPHILS # BLD AUTO: 2.8 K/UL (ref 1.8–7.7)
NEUTROPHILS NFR BLD: 59.2 % (ref 38–73)
NONHDLC SERPL-MCNC: 102 MG/DL
NRBC BLD-RTO: 0 /100 WBC
PLATELET # BLD AUTO: 201 K/UL (ref 150–450)
PMV BLD AUTO: 11.5 FL (ref 9.2–12.9)
RBC # BLD AUTO: 2.39 M/UL (ref 4–5.4)
TRIGL SERPL-MCNC: 118 MG/DL (ref 30–150)
WBC # BLD AUTO: 4.7 K/UL (ref 3.9–12.7)

## 2022-11-17 PROCEDURE — 83036 HEMOGLOBIN GLYCOSYLATED A1C: CPT | Performed by: FAMILY MEDICINE

## 2022-11-17 PROCEDURE — 85025 COMPLETE CBC W/AUTO DIFF WBC: CPT | Performed by: FAMILY MEDICINE

## 2022-11-17 PROCEDURE — 80061 LIPID PANEL: CPT | Performed by: FAMILY MEDICINE

## 2022-11-21 ENCOUNTER — HOSPITAL ENCOUNTER (OUTPATIENT)
Dept: RADIOLOGY | Facility: HOSPITAL | Age: 87
Discharge: HOME | End: 2022-11-21
Attending: PHYSICIAN ASSISTANT
Payer: MEDICARE

## 2022-11-21 ENCOUNTER — OFFICE VISIT (OUTPATIENT)
Dept: ORTHOPEDICS | Facility: CLINIC | Age: 87
End: 2022-11-21
Payer: MEDICARE

## 2022-11-21 VITALS
WEIGHT: 166 LBS | BODY MASS INDEX: 32.59 KG/M2 | DIASTOLIC BLOOD PRESSURE: 49 MMHG | SYSTOLIC BLOOD PRESSURE: 109 MMHG | HEIGHT: 60 IN | HEART RATE: 51 BPM

## 2022-11-21 DIAGNOSIS — M89.8X5 PAIN OF LEFT FEMUR: Primary | ICD-10-CM

## 2022-11-21 DIAGNOSIS — I82.4Z2 ACUTE DEEP VEIN THROMBOSIS (DVT) OF DISTAL VEIN OF LEFT LOWER EXTREMITY: ICD-10-CM

## 2022-11-21 DIAGNOSIS — M89.8X5 PAIN OF LEFT FEMUR: ICD-10-CM

## 2022-11-21 DIAGNOSIS — R10.2 PAIN IN PELVIS: ICD-10-CM

## 2022-11-21 DIAGNOSIS — Z96.649 PERIPROSTHETIC FRACTURE OF FEMUR FOLLOWING TOTAL REPLACEMENT OF HIP, SUBSEQUENT ENCOUNTER: Primary | ICD-10-CM

## 2022-11-21 DIAGNOSIS — M97.8XXD PERIPROSTHETIC FRACTURE OF FEMUR FOLLOWING TOTAL REPLACEMENT OF HIP, SUBSEQUENT ENCOUNTER: Primary | ICD-10-CM

## 2022-11-21 PROCEDURE — 99214 PR OFFICE/OUTPT VISIT, EST, LEVL IV, 30-39 MIN: ICD-10-PCS | Mod: S$PBB,,, | Performed by: PHYSICIAN ASSISTANT

## 2022-11-21 PROCEDURE — 99999 PR PBB SHADOW E&M-EST. PATIENT-LVL IV: ICD-10-PCS | Mod: PBBFAC,,, | Performed by: PHYSICIAN ASSISTANT

## 2022-11-21 PROCEDURE — 99214 OFFICE O/P EST MOD 30 MIN: CPT | Mod: PBBFAC | Performed by: PHYSICIAN ASSISTANT

## 2022-11-21 PROCEDURE — 72170 X-RAY EXAM OF PELVIS: CPT | Mod: TC

## 2022-11-21 PROCEDURE — 99214 OFFICE O/P EST MOD 30 MIN: CPT | Mod: S$PBB,,, | Performed by: PHYSICIAN ASSISTANT

## 2022-11-21 PROCEDURE — 99999 PR PBB SHADOW E&M-EST. PATIENT-LVL IV: CPT | Mod: PBBFAC,,, | Performed by: PHYSICIAN ASSISTANT

## 2022-11-21 PROCEDURE — 73552 X-RAY EXAM OF FEMUR 2/>: CPT | Mod: TC,LT

## 2022-11-21 NOTE — PROGRESS NOTES
Subjective:      Patient ID: Rosy Balderrama is a 89 y.o. female.    Chief Complaint: No chief complaint on file.      HPI: Rosy Balderrama is a 89-year-old female in clinic today for postoperative follow-up.  Patient is 4 months status post operative fixation of left periprosthetic femur fracture.  Patient reports that her leg pain is unchanged from previous visit.  She remains nonweightbearing.  Patient reports she remains on Eliquis 5 mg twice daily for treatment of her left lower leg DVT.    Past Medical History:   Diagnosis Date    *Atrial fibrillation     Allergy     Blood transfusion     Cataract     Diabetes mellitus type II     Hyperlipidemia     Osteoporosis        Current Outpatient Medications:     AMINO ACIDS/MINERALS (A/G PRO ORAL), Take 2 tablets by mouth 2 (two) times a day., Disp: , Rfl:     ANTIOX#10/OM3/DHA/EPA/LUT/ZEAX (I-CAPS ORAL), Take 1 capsule by mouth once daily., Disp: , Rfl:     apixaban (ELIQUIS) 5 mg Tab, Take 5 mg by mouth 2 (two) times daily., Disp: , Rfl:     aspirin 81 MG Chew, Take 81 mg by mouth nightly., Disp: , Rfl:     bumetanide (BUMEX) 1 MG tablet, Take 1 mg by mouth once daily., Disp: , Rfl:     CALCIUM CARBONATE/VITAMIN D3 (CALTRATE 600 + D ORAL), Take 1 tablet by mouth once daily., Disp: , Rfl:     cranberry fruit concentrate (AZO CRANBERRY ORAL), Take 1 tablet by mouth 2 (two) times a day., Disp: , Rfl:     EScitalopram oxalate (LEXAPRO) 10 MG tablet, Take 10 mg by mouth once daily., Disp: , Rfl:     fexofenadine (ALLEGRA) 180 MG tablet, Take 180 mg by mouth once daily., Disp: , Rfl:     memantine (NAMENDA) 10 MG Tab, Take 10 mg by mouth 2 (two) times daily., Disp: , Rfl:     metoprolol tartrate (LOPRESSOR) 25 MG tablet, Take 12.5 mg by mouth 2 (two) times daily., Disp: , Rfl:     MULTIVITAMIN ORAL, Take 1 tablet by mouth once daily., Disp: , Rfl:     naproxen sodium (ANAPROX) 220 MG tablet, Take 220 mg by mouth 2 (two) times daily with meals., Disp: , Rfl:     omega-3 fatty  acids/fish oil (FISH OIL-OMEGA-3 FATTY ACIDS) 300-1,000 mg capsule, Take 1 capsule by mouth once daily., Disp: , Rfl:     pravastatin (PRAVACHOL) 40 MG tablet, Take 40 mg by mouth once daily., Disp: , Rfl:     QUEtiapine (SEROQUEL) 25 MG Tab, Take 25 mg by mouth nightly., Disp: , Rfl:     raloxifene (EVISTA) 60 mg tablet, Take 60 mg by mouth once daily., Disp: , Rfl:     sitagliptan (JANUVIA) 100 MG Tab, Take 100 mg by mouth once daily., Disp: , Rfl:     traMADoL (ULTRAM) 50 mg tablet, Take 50 mg by mouth every 8 (eight) hours as needed for Pain., Disp: , Rfl:     vitamin D (VITAMIN D3) 1000 units Tab, Take 1,000 Units by mouth once daily., Disp: , Rfl:   Review of patient's allergies indicates:   Allergen Reactions    Alendronate Other (See Comments)    Atorvastatin Other (See Comments)    Ciprofloxacin Other (See Comments)    Codeine Itching    Diazepam Itching    Ibuprofen Other (See Comments)    Oxycodone-acetaminophen Other (See Comments) and Itching    Rofecoxib Other (See Comments)    Seldane     Simvastatin Other (See Comments)       BP (!) 109/49   Pulse (!) 51   Ht 5' (1.524 m)   Wt 75.3 kg (166 lb)   BMI 32.42 kg/m²     ROS      Objective:    Ortho Exam   Left lower extremity:  Incision well healed, no signs of infection   Pain increases with hip flexion   No pain with internal/rotation  Left leg continues to be more swollen than the right   No TTP   Calf and compartments are soft and compressible   Motor exam normal   Sensation and pulses intact    GEN: Well developed, well nourished female. AAOX3. No acute distress.   Head: Normocephalic, atraumatic.   Eyes: MARY  Neck: Trachea is midline, no adenopathy  Resp: Breathing unlabored.  Neuro: Motor function normal, Cranial nerves intact  Psych: Mood and affect appropriate.       Assessment:     Imaging:  X-ray left femur obtained today shows hardware in similar alignment to previous films with interval healing noted.  The fracture is not completely  healed, but no further collapse has been noted        1. Periprosthetic fracture of femur following total replacement of hip, subsequent encounter    2. Acute deep vein thrombosis (DVT) of distal vein of left lower extremity          Plan:       Reviewed the radiographs with the patient and her children.  Recommended she continue nonweightbearing for 3 more weeks and return to clinic for repeat radiographs and further evaluation.  I have sent in a referral for vascular consult for management of DVT treatment.    Orders Placed This Encounter    Ambulatory referral/consult to Vascular Medicine     Follow up in about 3 weeks (around 12/12/2022).          Patient note was created using odal Dictation.  Any errors in syntax or even information may not have been identified and edited on initial review prior to signing this note.

## 2022-11-22 ENCOUNTER — EXTERNAL HOME HEALTH (OUTPATIENT)
Dept: HOME HEALTH SERVICES | Facility: HOSPITAL | Age: 87
End: 2022-11-22
Payer: MEDICARE

## 2022-11-22 NOTE — PROGRESS NOTES
[facility]  Medical Home Visit    Visit Date: 11/23/2022  Encounter Provider: Faye Ramírez  PCP:  Maude Barrios MD    Subjective:      Patient ID: Rosy Balderrama is a 89 y.o. female.    Consult Requested By:  No ref. provider found  Reason for Consult:  Establish care    Rosy is being seen at home due to being seen at home due to physical debility that presents a taxing effort to leave the home, to mitigate high risk of hospital readmission and/or due to the limited availability of reliable or safe options for transportation to the point of access to the provider. Prior to treatment on this visit the chart was reviewed and patient verbal consent was obtained.    Chief Complaint: Establish [facility] 3 mths rehab.  Patient is being seen today to [facility]. Upon visit patient is lying in hospital bed in no acute distress, VSS, AAOx3.  Daughter-in-law and sitter present on visit.  Patient reports having a fall in July resulting in a left femur fracture.  Patient had ORIF of left femur fracture and then went to the [facility] for rehab. She developed leg swelling while at the [facility] and was discovered to have a DVT.  She in on Eliquis and will complete therapy in 1/23.  Patient has no complaints at this time.  Reports compliance with all medications.  Questions elicited and answered.          Review of Systems   Constitutional: Negative.    HENT: Negative.     Eyes: Negative.    Respiratory: Negative.     Cardiovascular: Negative.    Gastrointestinal: Negative.    Endocrine: Negative.    Genitourinary: Negative.    Musculoskeletal:  Positive for gait problem (Transfers with niko lift to wheelchair).   Skin:  Positive for wound (red coccyx).   Allergic/Immunologic: Negative.    Neurological:  Positive for weakness (Bed bound since fall, PT working with patient).   Hematological: Negative.    Psychiatric/Behavioral: Negative.       Assessments:  Environmental: Patient lives in a single story  home with no steps at the entrance.  Adequate lighting and comfortable temperature.   Functional Status: Patient is bed bound since fall in July.  Family transfers patient to wheelchair with niko lift. Needs assistance with ADL's.  Has a sitter that stays with patient during the day.   Safety: Fall and general safety precautions.   Nutritional: Adequate food in the home.   Home Health/DME/Supplies: [facility]. DME: Hospital bed, niko lift, wheelchair.    Objective:   Physical Exam  Constitutional:       Appearance: Normal appearance. She is normal weight.   HENT:      Nose: Nose normal.      Mouth/Throat:      Mouth: Mucous membranes are moist.      Pharynx: Oropharynx is clear.   Eyes:      Pupils: Pupils are equal, round, and reactive to light.   Cardiovascular:      Rate and Rhythm: Normal rate and regular rhythm.      Pulses: Normal pulses.      Heart sounds: Normal heart sounds.   Pulmonary:      Effort: Pulmonary effort is normal.      Breath sounds: Normal breath sounds.   Abdominal:      General: Abdomen is flat. Bowel sounds are normal.      Palpations: Abdomen is soft.   Musculoskeletal:         General: Normal range of motion.      Cervical back: Normal range of motion.   Skin:     General: Skin is warm and dry.      Findings: Erythema (coccyx) present.   Neurological:      General: No focal deficit present.      Mental Status: She is alert and oriented to person, place, and time. Mental status is at baseline.      Motor: Weakness present.      Gait: Gait abnormal.   Psychiatric:         Mood and Affect: Mood normal.         Behavior: Behavior normal.         Thought Content: Thought content normal.         Judgment: Judgment normal.       Vitals:    11/23/22 1007   BP: (!) 142/64   Pulse: 67   Resp: 18   Temp: 98.1 °F (36.7 °C)   SpO2: 98%     There is no height or weight on file to calculate BMI.    Assessment:     1. Deep vein thrombosis (DVT) of left lower extremity, unspecified chronicity,  unspecified vein    2. Controlled type 2 diabetes mellitus without complication, without long-term current use of insulin        Plan:     Ethical / Legal: Advance Care Planning   Surrogate decision maker:  Tremaine Theodore, Relationship: Son  Code Status:  Full  [place]:  None  Other advance directive:  None, Capacity to make medical decisions:  Yes, Conflict No       1. Deep vein thrombosis (DVT) of left lower extremity, unspecified chronicity, unspecified vein  Comments:  Recent DVT diagnosis on 10/19  Currently on Eliquis 5mg twice daily  No leg swelling on visit  Monitor    2. Controlled type 2 diabetes mellitus without complication, without long-term current use of insulin  Comments:  Recent A1C 5.9  On Januvia  Monitor     Encounter for Medical Follow-Up and Medication Review  - [facility] to schedule follow-up visit with patient PRN     Patient Instructions Given:  - Continue all medications, treatments and therapies as ordered.   - Follow all instructions, recommendations as discussed.  - Maintain Safety Precautions at all times.  - Attend all medical appointments as scheduled.  - For worsening symptoms: call Primary Care Physician or Nurse Practitioner.  - For emergencies, call 911 or immediately report to the nearest emergency room  Were controlled substances prescribed?  No    Follow Up Appointments:   Future Appointments   Date Time Provider Department Center   12/12/2022  8:30 AM [facility] [id number] [facility] X[facility] [place]   12/12/2022  9:30 AM [facility] XR[facility] [place]   12/12/2022 11:40 AM [facility]O TRAUMA [facility] ON [facility]   1/12/2023  4:00 PM Nathalie Blakely MD ON V[facility]       Signature: Faye Ramírez NP

## 2022-11-23 ENCOUNTER — CARE AT HOME (OUTPATIENT)
Dept: HOME HEALTH SERVICES | Facility: CLINIC | Age: 87
End: 2022-11-23
Payer: MEDICARE

## 2022-11-23 VITALS
OXYGEN SATURATION: 98 % | DIASTOLIC BLOOD PRESSURE: 64 MMHG | TEMPERATURE: 98 F | SYSTOLIC BLOOD PRESSURE: 142 MMHG | HEART RATE: 67 BPM | RESPIRATION RATE: 18 BRPM

## 2022-11-23 DIAGNOSIS — E11.9 CONTROLLED TYPE 2 DIABETES MELLITUS WITHOUT COMPLICATION, WITHOUT LONG-TERM CURRENT USE OF INSULIN: ICD-10-CM

## 2022-11-23 DIAGNOSIS — I82.402 DEEP VEIN THROMBOSIS (DVT) OF LEFT LOWER EXTREMITY, UNSPECIFIED CHRONICITY, UNSPECIFIED VEIN: Primary | ICD-10-CM

## 2022-11-23 PROCEDURE — 99344 HOME/RES VST NEW MOD MDM 60: CPT | Mod: ,,, | Performed by: NURSE PRACTITIONER

## 2022-11-23 PROCEDURE — 99344 PR HOME VISIT,NEW PATIENT,LEVEL IV: ICD-10-PCS | Mod: ,,, | Performed by: NURSE PRACTITIONER

## 2022-11-29 ENCOUNTER — LAB VISIT (OUTPATIENT)
Dept: LAB | Facility: HOSPITAL | Age: 87
End: 2022-11-29
Attending: FAMILY MEDICINE
Payer: MEDICARE

## 2022-11-29 DIAGNOSIS — I10 ESSENTIAL HYPERTENSION, MALIGNANT: ICD-10-CM

## 2022-11-29 DIAGNOSIS — Z13.29 SCREENING FOR THYROID DISORDER: ICD-10-CM

## 2022-11-29 DIAGNOSIS — E11.9 DIABETES MELLITUS WITHOUT COMPLICATION: Primary | ICD-10-CM

## 2022-11-29 LAB
BACTERIA #/AREA URNS HPF: ABNORMAL /HPF
BILIRUB UR QL STRIP: NEGATIVE
CLARITY UR: ABNORMAL
COLOR UR: YELLOW
GLUCOSE UR QL STRIP: NEGATIVE
HGB UR QL STRIP: NEGATIVE
KETONES UR QL STRIP: NEGATIVE
LEUKOCYTE ESTERASE UR QL STRIP: ABNORMAL
MICROSCOPIC COMMENT: ABNORMAL
NITRITE UR QL STRIP: POSITIVE
PH UR STRIP: 7 [PH] (ref 5–8)
PROT UR QL STRIP: ABNORMAL
RBC #/AREA URNS HPF: 5 /HPF (ref 0–4)
SP GR UR STRIP: 1.02 (ref 1–1.03)
T4 FREE SERPL-MCNC: 0.88 NG/DL (ref 0.71–1.51)
TSH SERPL DL<=0.005 MIU/L-ACNC: 4.22 UIU/ML (ref 0.4–4)
URN SPEC COLLECT METH UR: ABNORMAL
UROBILINOGEN UR STRIP-ACNC: NEGATIVE EU/DL
WBC #/AREA URNS HPF: >100 /HPF (ref 0–5)
WBC CLUMPS URNS QL MICRO: ABNORMAL
YEAST URNS QL MICRO: ABNORMAL

## 2022-11-29 PROCEDURE — 87086 URINE CULTURE/COLONY COUNT: CPT | Performed by: FAMILY MEDICINE

## 2022-11-29 PROCEDURE — 87077 CULTURE AEROBIC IDENTIFY: CPT | Performed by: FAMILY MEDICINE

## 2022-11-29 PROCEDURE — 84439 ASSAY OF FREE THYROXINE: CPT | Performed by: FAMILY MEDICINE

## 2022-11-29 PROCEDURE — 87088 URINE BACTERIA CULTURE: CPT | Performed by: FAMILY MEDICINE

## 2022-11-29 PROCEDURE — 87186 SC STD MICRODIL/AGAR DIL: CPT | Performed by: FAMILY MEDICINE

## 2022-11-29 PROCEDURE — 84443 ASSAY THYROID STIM HORMONE: CPT | Performed by: FAMILY MEDICINE

## 2022-11-29 PROCEDURE — 81000 URINALYSIS NONAUTO W/SCOPE: CPT | Performed by: FAMILY MEDICINE

## 2022-12-01 ENCOUNTER — TELEPHONE (OUTPATIENT)
Dept: FAMILY MEDICINE | Facility: CLINIC | Age: 87
End: 2022-12-01

## 2022-12-01 NOTE — TELEPHONE ENCOUNTER
It won't let me, maybe Dr Barrios not with [facility] , looks like she has care at home for a wound but I couldn't forward to that provider either

## 2022-12-01 NOTE — TELEPHONE ENCOUNTER
----- Message from Black Guaman MD sent at 11/29/2022 10:00 PM CST -----  Not our patient, labs came to me please forward it to PCP.

## 2022-12-02 LAB — BACTERIA UR CULT: ABNORMAL

## 2022-12-12 ENCOUNTER — OFFICE VISIT (OUTPATIENT)
Dept: ORTHOPEDICS | Facility: CLINIC | Age: 87
End: 2022-12-12
Payer: MEDICARE

## 2022-12-12 ENCOUNTER — HOSPITAL ENCOUNTER (OUTPATIENT)
Dept: RADIOLOGY | Facility: HOSPITAL | Age: 87
Discharge: HOME | End: 2022-12-12
Attending: PHYSICIAN ASSISTANT
Payer: MEDICARE

## 2022-12-12 VITALS
DIASTOLIC BLOOD PRESSURE: 60 MMHG | HEIGHT: 60 IN | WEIGHT: 166 LBS | HEART RATE: 51 BPM | SYSTOLIC BLOOD PRESSURE: 124 MMHG | BODY MASS INDEX: 32.59 KG/M2

## 2022-12-12 DIAGNOSIS — M89.8X5 PAIN OF LEFT FEMUR: ICD-10-CM

## 2022-12-12 DIAGNOSIS — Z96.649 PERIPROSTHETIC FRACTURE OF FEMUR FOLLOWING TOTAL REPLACEMENT OF HIP, SUBSEQUENT ENCOUNTER: Primary | ICD-10-CM

## 2022-12-12 DIAGNOSIS — R10.2 PAIN IN PELVIS: ICD-10-CM

## 2022-12-12 DIAGNOSIS — M97.8XXD PERIPROSTHETIC FRACTURE OF FEMUR FOLLOWING TOTAL REPLACEMENT OF HIP, SUBSEQUENT ENCOUNTER: Primary | ICD-10-CM

## 2022-12-12 DIAGNOSIS — M89.8X5 PAIN OF LEFT FEMUR: Primary | ICD-10-CM

## 2022-12-12 PROCEDURE — 99214 PR OFFICE/OUTPT VISIT, EST, LEVL IV, 30-39 MIN: ICD-10-PCS | Mod: S$PBB,,, | Performed by: ORTHOPAEDIC SURGERY

## 2022-12-12 PROCEDURE — 99999 PR PBB SHADOW E&M-EST. PATIENT-LVL V: ICD-10-PCS | Mod: PBBFAC,,, | Performed by: ORTHOPAEDIC SURGERY

## 2022-12-12 PROCEDURE — 72170 X-RAY EXAM OF PELVIS: CPT | Mod: TC

## 2022-12-12 PROCEDURE — 73552 X-RAY EXAM OF FEMUR 2/>: CPT | Mod: TC,LT

## 2022-12-12 PROCEDURE — 99214 OFFICE O/P EST MOD 30 MIN: CPT | Mod: S$PBB,,, | Performed by: ORTHOPAEDIC SURGERY

## 2022-12-12 PROCEDURE — 99999 PR PBB SHADOW E&M-EST. PATIENT-LVL V: CPT | Mod: PBBFAC,,, | Performed by: ORTHOPAEDIC SURGERY

## 2022-12-12 PROCEDURE — 99215 OFFICE O/P EST HI 40 MIN: CPT | Mod: PBBFAC | Performed by: ORTHOPAEDIC SURGERY

## 2022-12-12 NOTE — PROGRESS NOTES
Subjective:     Patient ID: Rosy Balderrama is a 89 y.o. female.    Chief Complaint: Post-op Evaluation of the Left Femur      HPI:  The patient returns for follow-up of left periprosthetic femur fracture.  Had surgical repair with Dr. Marx on July 19.  So she is now essentially 5 months out.  Is at home.  The patient has dementia.  She is accompanied by her son who provides history.  She does have pain along the left lateral thigh and at the left knee.  She is been nonweightbearing.  Bed to chair activities with the use of a .    The patient's son is upset that the nursing center was allowing her to weightbear after being advised here now to weightbear.  Reviewing her information the patient was toe-touch weight-bearing postop until seen September 21st.  At that time she is allowed to progressively weightbear as tolerated.  On October 24th loss of fixation was identified on x-ray and nonweightbearing was recommended and at that point the patient's son says that the nursing center continued to attempt weight-bearing.  Since home they been keeping weight off of her leg.      Past Medical History:   Diagnosis Date    *Atrial fibrillation     Allergy     Blood transfusion     Cataract     Diabetes mellitus type II     Hyperlipidemia     Osteoporosis      Past Surgical History:   Procedure Laterality Date    ANKLE SURGERY      broken ankle    BILATERAL SALPINGOOPHORECTOMY      BREAST BIOPSY      CARPAL TUNNEL RELEASE      CHOLECYSTECTOMY      COLONOSCOPY      ERCP      ESOPHAGOGASTRODUODENOSCOPY      FRACTURE SURGERY      KNEE CARTILAGE SURGERY      ORIF FEMUR FRACTURE Left 7/19/2022    Procedure: ORIF, FRACTURE, FEMUR;  Surgeon: Yulissa Marx MD;  Location: [facility];  Service: Orthopedics;  Laterality: Left;  Periprosthetic femur fracture    TOTAL ABDOMINAL HYSTERECTOMY      TOTAL HIP ARTHROPLASTY      TUBAL LIGATION       Family History   Problem Relation Age of Onset    Cancer Mother     Cancer Sister      Diabetes Brother      Social History     Socioeconomic History    Marital status:    Occupational History    Occupation: Retired/Housewife     Comment: Housewife   Tobacco Use    Smoking status: Never    Smokeless tobacco: Never   Substance and Sexual Activity    Alcohol use: No    Drug use: No   Social History Narrative    Patient is a housewife.     Medication List with Changes/Refills   Current Medications    AMINO ACIDS/MINERALS (A/G PRO ORAL)    Take 2 tablets by mouth 2 (two) times a day.    ANTIOX#10/OM3/DHA/EPA/LUT/ZEAX (I-CAPS ORAL)    Take 1 capsule by mouth once daily.    APIXABAN (ELIQUIS) 5 MG TAB    Take 5 mg by mouth 2 (two) times daily.    ASPIRIN 81 MG CHEW    Take 81 mg by mouth nightly.    BUMETANIDE (BUMEX) 1 MG TABLET    Take 1 mg by mouth once daily.    CALCIUM CARBONATE/VITAMIN D3 (CALTRATE 600 + D ORAL)    Take 1 tablet by mouth once daily.    CRANBERRY FRUIT CONCENTRATE (AZO CRANBERRY ORAL)    Take 1 tablet by mouth 2 (two) times a day.    ESCITALOPRAM OXALATE (LEXAPRO) 10 MG TABLET    Take 10 mg by mouth once daily.    FEXOFENADINE (ALLEGRA) 180 MG TABLET    Take 180 mg by mouth once daily.    MEMANTINE (NAMENDA) 10 MG TAB    Take 10 mg by mouth 2 (two) times daily.    METOPROLOL TARTRATE (LOPRESSOR) 25 MG TABLET    Take 12.5 mg by mouth 2 (two) times daily.    MULTIVITAMIN ORAL    Take 1 tablet by mouth once daily.    NAPROXEN SODIUM (ANAPROX) 220 MG TABLET    Take 220 mg by mouth 2 (two) times daily with meals.    OMEGA-3 FATTY ACIDS/FISH OIL (FISH OIL-OMEGA-3 FATTY ACIDS) 300-1,000 MG CAPSULE    Take 1 capsule by mouth once daily.    PRAVASTATIN (PRAVACHOL) 40 MG TABLET    Take 40 mg by mouth once daily.    QUETIAPINE (SEROQUEL) 25 MG TAB    Take 25 mg by mouth nightly.    RALOXIFENE (EVISTA) 60 MG TABLET    Take 60 mg by mouth once daily.    SITAGLIPTAN (JANUVIA) 100 MG TAB    Take 100 mg by mouth once daily.    TRAMADOL (ULTRAM) 50 MG TABLET    Take 50 mg by mouth every 8  (eight) hours as needed for Pain.    VITAMIN D (VITAMIN D3) 1000 UNITS TAB    Take 1,000 Units by mouth once daily.     Review of patient's allergies indicates:   Allergen Reactions    Alendronate Other (See Comments)    Atorvastatin Other (See Comments)    Ciprofloxacin Other (See Comments)    Codeine Itching    Diazepam Itching    Ibuprofen Other (See Comments)    Nitrofurantoin monohyd/m-cryst     Oxycodone-acetaminophen Other (See Comments) and Itching    Rofecoxib Other (See Comments)    Seldane     Simvastatin Other (See Comments)     ROS     Objective:   Body mass index is 32.42 kg/m².  Vitals:    12/12/22 1000   BP: 124/60   Pulse: (!) 51   Weight: 75.3 kg (166 lb 0.1 oz)   Height: 5' (1.524 m)   PainSc:   7   PainLoc: Leg       PHYSICAL EXAM:  Obese female in no acute distress.  She is awake and alert.  Offers little information.      The left thigh is nontender.  There is pain in the thigh area with range of motion of the hip and knee although the patient can not localize this.    X-rays of the left femur are obtained today and reviewed.  These are compared to prior films.  The patient has a healing fracture of the midshaft femur below the tip of the femoral prosthesis.  Repaired with long plate and screws and cables.  As of September 21st internal fixation was intact with anatomic alignment of the femur.  When seen on October 24th x-rays showed that the fracture had tilted into mild varus.  No evidence of hardware breakage so likely the cables have cut into the bone around the proximal fragment.  Further x-rays since then including today reveal stable varus position without further worsening.  There is some increased callus today compared to November 21st x-rays.    Periprosthetic fracture of femur following total replacement of hip, subsequent encounter        Plan:  The patient has a healing periprosthetic fracture of the left femur.  Given her history of the fracture going into varus I recommend that  she be maintained in the nonweightbearing status and have follow-up x-rays in 4 weeks.  I went over all the x-rays with her son.  Explained that it is healing but is not worth a chance of further worsening of the alignment and potential need for further surgery to begin weight-bearing prematurely.    Patient Instructions   Healing fracture of the left femur.  Not solid enough yet for weight-bearing    Continue no weight on left leg and return for x-rays in 4 weeks           Tesfaye Webb MD, FAAOS [facility], Orthopedic Trauma Service  [employer]

## 2022-12-12 NOTE — PATIENT INSTRUCTIONS
Healing fracture of the left femur.  Not solid enough yet for weight-bearing    Continue no weight on left leg and return for x-rays in 4 weeks

## 2023-01-05 DIAGNOSIS — R10.2 PAIN IN PELVIS: Primary | ICD-10-CM

## 2023-01-09 ENCOUNTER — HOSPITAL ENCOUNTER (OUTPATIENT)
Dept: RADIOLOGY | Facility: HOSPITAL | Age: 88
Discharge: HOME | End: 2023-01-09
Attending: ORTHOPAEDIC SURGERY
Payer: MEDICARE

## 2023-01-09 ENCOUNTER — OFFICE VISIT (OUTPATIENT)
Dept: ORTHOPEDICS | Facility: CLINIC | Age: 88
End: 2023-01-09
Payer: MEDICARE

## 2023-01-09 ENCOUNTER — HOSPITAL ENCOUNTER (OUTPATIENT)
Dept: RADIOLOGY | Facility: HOSPITAL | Age: 88
Discharge: HOME | End: 2023-01-09
Attending: PHYSICIAN ASSISTANT
Payer: MEDICARE

## 2023-01-09 VITALS
BODY MASS INDEX: 32.59 KG/M2 | WEIGHT: 166 LBS | DIASTOLIC BLOOD PRESSURE: 52 MMHG | HEART RATE: 58 BPM | HEIGHT: 60 IN | SYSTOLIC BLOOD PRESSURE: 91 MMHG

## 2023-01-09 DIAGNOSIS — R10.2 PAIN IN PELVIS: Primary | ICD-10-CM

## 2023-01-09 DIAGNOSIS — M97.8XXD PERIPROSTHETIC FRACTURE OF FEMUR FOLLOWING TOTAL REPLACEMENT OF HIP, SUBSEQUENT ENCOUNTER: Primary | ICD-10-CM

## 2023-01-09 DIAGNOSIS — M80.00XD AGE-RELATED OSTEOPOROSIS WITH CURRENT PATHOLOGICAL FRACTURE WITH ROUTINE HEALING, SUBSEQUENT ENCOUNTER: ICD-10-CM

## 2023-01-09 DIAGNOSIS — M89.8X5 PAIN OF LEFT FEMUR: ICD-10-CM

## 2023-01-09 DIAGNOSIS — Z96.649 PERIPROSTHETIC FRACTURE OF FEMUR FOLLOWING TOTAL REPLACEMENT OF HIP, SUBSEQUENT ENCOUNTER: Primary | ICD-10-CM

## 2023-01-09 DIAGNOSIS — R10.2 PAIN IN PELVIS: ICD-10-CM

## 2023-01-09 PROCEDURE — 99999 PR PBB SHADOW E&M-EST. PATIENT-LVL IV: CPT | Mod: PBBFAC,,, | Performed by: PHYSICIAN ASSISTANT

## 2023-01-09 PROCEDURE — 72170 X-RAY EXAM OF PELVIS: CPT | Mod: TC

## 2023-01-09 PROCEDURE — 72170 XR PELVIS ROUTINE AP: ICD-10-PCS | Mod: 26,,, | Performed by: RADIOLOGY

## 2023-01-09 PROCEDURE — 99214 OFFICE O/P EST MOD 30 MIN: CPT | Mod: S$PBB,,, | Performed by: PHYSICIAN ASSISTANT

## 2023-01-09 PROCEDURE — 99999 PR PBB SHADOW E&M-EST. PATIENT-LVL IV: ICD-10-PCS | Mod: PBBFAC,,, | Performed by: PHYSICIAN ASSISTANT

## 2023-01-09 PROCEDURE — 73552 XR FEMUR 2 VIEW LEFT: ICD-10-PCS | Mod: 26,LT,, | Performed by: RADIOLOGY

## 2023-01-09 PROCEDURE — 73552 X-RAY EXAM OF FEMUR 2/>: CPT | Mod: 26,LT,, | Performed by: RADIOLOGY

## 2023-01-09 PROCEDURE — 99214 PR OFFICE/OUTPT VISIT, EST, LEVL IV, 30-39 MIN: ICD-10-PCS | Mod: S$PBB,,, | Performed by: PHYSICIAN ASSISTANT

## 2023-01-09 PROCEDURE — 99214 OFFICE O/P EST MOD 30 MIN: CPT | Mod: PBBFAC | Performed by: PHYSICIAN ASSISTANT

## 2023-01-09 PROCEDURE — 72170 X-RAY EXAM OF PELVIS: CPT | Mod: 26,,, | Performed by: RADIOLOGY

## 2023-01-09 PROCEDURE — 73552 X-RAY EXAM OF FEMUR 2/>: CPT | Mod: TC,LT

## 2023-01-09 NOTE — PROGRESS NOTES
Subjective:      Patient ID: Rosy Balderrama is a 89 y.o. female.    Chief Complaint: Pain, Injury, and Post-op Evaluation of the Left Femur      HPI: Rosy Balderrama is an 81-year-old female in clinic today for postoperative follow-up.  Patient underwent operative fixation of left periprosthetic femur fracture on 07/19/2022.  She was last seen in this clinic on 12/12/2022.  She has been nonweightbearing since her radiographs on 10/24/2022, but there was some confusion with the skilled nursing facility and she was not made completely nonweightbearing at that time.    Past Medical History:   Diagnosis Date    *Atrial fibrillation     Allergy     Blood transfusion     Cataract     Diabetes mellitus type II     Hyperlipidemia     Osteoporosis        Current Outpatient Medications:     AMINO ACIDS/MINERALS (A/G PRO ORAL), Take 2 tablets by mouth 2 (two) times a day., Disp: , Rfl:     ANTIOX#10/OM3/DHA/EPA/LUT/ZEAX (I-CAPS ORAL), Take 1 capsule by mouth once daily., Disp: , Rfl:     apixaban (ELIQUIS) 5 mg Tab, Take 5 mg by mouth 2 (two) times daily., Disp: , Rfl:     aspirin 81 MG Chew, Take 81 mg by mouth nightly., Disp: , Rfl:     bumetanide (BUMEX) 1 MG tablet, Take 1 mg by mouth once daily., Disp: , Rfl:     CALCIUM CARBONATE/VITAMIN D3 (CALTRATE 600 + D ORAL), Take 1 tablet by mouth once daily., Disp: , Rfl:     cranberry fruit concentrate (AZO CRANBERRY ORAL), Take 1 tablet by mouth 2 (two) times a day., Disp: , Rfl:     EScitalopram oxalate (LEXAPRO) 10 MG tablet, Take 10 mg by mouth once daily., Disp: , Rfl:     fexofenadine (ALLEGRA) 180 MG tablet, Take 180 mg by mouth once daily., Disp: , Rfl:     memantine (NAMENDA) 10 MG Tab, Take 10 mg by mouth 2 (two) times daily., Disp: , Rfl:     metoprolol tartrate (LOPRESSOR) 25 MG tablet, Take 12.5 mg by mouth 2 (two) times daily., Disp: , Rfl:     MULTIVITAMIN ORAL, Take 1 tablet by mouth once daily., Disp: , Rfl:     naproxen sodium (ANAPROX) 220 MG tablet, Take 220 mg  by mouth 2 (two) times daily with meals., Disp: , Rfl:     omega-3 fatty acids/fish oil (FISH OIL-OMEGA-3 FATTY ACIDS) 300-1,000 mg capsule, Take 1 capsule by mouth once daily., Disp: , Rfl:     pravastatin (PRAVACHOL) 40 MG tablet, Take 40 mg by mouth once daily., Disp: , Rfl:     QUEtiapine (SEROQUEL) 25 MG Tab, Take 25 mg by mouth nightly., Disp: , Rfl:     raloxifene (EVISTA) 60 mg tablet, Take 60 mg by mouth once daily., Disp: , Rfl:     sitagliptan (JANUVIA) 100 MG Tab, Take 100 mg by mouth once daily., Disp: , Rfl:     traMADoL (ULTRAM) 50 mg tablet, Take 50 mg by mouth every 8 (eight) hours as needed for Pain., Disp: , Rfl:     vitamin D (VITAMIN D3) 1000 units Tab, Take 1,000 Units by mouth once daily., Disp: , Rfl:   Review of patient's allergies indicates:   Allergen Reactions    Alendronate Other (See Comments)    Atorvastatin Other (See Comments)    Ciprofloxacin Other (See Comments)    Codeine Itching    Diazepam Itching    Ibuprofen Other (See Comments)    Nitrofurantoin monohyd/m-cryst     Oxycodone-acetaminophen Other (See Comments) and Itching    Rofecoxib Other (See Comments)    Seldane     Simvastatin Other (See Comments)       BP (!) 91/52   Pulse (!) 58   Ht 5' (1.524 m)   Wt 75.3 kg (166 lb)   BMI 32.42 kg/m²     ROS      Objective:    Ortho Exam   Left lower extremity:  Incision well healed, no signs of infection   No pain with hip flexion/extension or internal/external rotation   No TTP  Calf and compartments are soft and compressible  Motor exam normal   Sensation and pulses intact    GEN: Well developed, well nourished female. AAOX3. No acute distress.   Head: Normocephalic, atraumatic.   Eyes: MARY  Neck: Trachea is midline, no adenopathy  Resp: Breathing unlabored.  Neuro: Motor function normal, Cranial nerves intact  Psych: Mood and affect appropriate.       Assessment:     Imaging:  X-ray left femur obtained today shows hardware in satisfactory alignment to previous films.  There  is a fracture lucency just distal to the tip of the stem of her hip prosthesis        1. Periprosthetic fracture of femur following total replacement of hip, subsequent encounter    2. Age-related osteoporosis with current pathological fracture with routine healing, subsequent encounter          Plan:       Discuss this case with Dr. Marx who agrees with the following plan.  Reviewed the radiographs with the patient and her son.  Discussed operative versus non operative management.  Recommended non operative management with nonweightbearing and continued monitoring at this time.  Explained that we could perform revision, but it would be a major surgery and son does not wish to put the patient through that at this time.  We will see her back in about 6 weeks for repeat radiographs and further evaluation       Follow up in about 6 weeks (around 2/20/2023).          Patient note was created using Pixplit Dictation.  Any errors in syntax or even information may not have been identified and edited on initial review prior to signing this note.

## 2023-01-12 ENCOUNTER — OFFICE VISIT (OUTPATIENT)
Dept: CARDIOLOGY | Facility: CLINIC | Age: 88
End: 2023-01-12
Payer: MEDICARE

## 2023-01-12 VITALS
HEIGHT: 60 IN | DIASTOLIC BLOOD PRESSURE: 80 MMHG | SYSTOLIC BLOOD PRESSURE: 124 MMHG | HEART RATE: 67 BPM | OXYGEN SATURATION: 99 % | BODY MASS INDEX: 32.42 KG/M2

## 2023-01-12 DIAGNOSIS — I82.4Z2 ACUTE DEEP VEIN THROMBOSIS (DVT) OF DISTAL VEIN OF LEFT LOWER EXTREMITY: ICD-10-CM

## 2023-01-12 DIAGNOSIS — I48.0 PAROXYSMAL ATRIAL FIBRILLATION: Primary | ICD-10-CM

## 2023-01-12 DIAGNOSIS — E11.9 CONTROLLED TYPE 2 DIABETES MELLITUS WITHOUT COMPLICATION, WITHOUT LONG-TERM CURRENT USE OF INSULIN: ICD-10-CM

## 2023-01-12 DIAGNOSIS — I82.532 CHRONIC EMBOLISM AND THROMBOSIS OF LEFT POPLITEAL VEIN: ICD-10-CM

## 2023-01-12 DIAGNOSIS — E78.5 HYPERLIPIDEMIA, UNSPECIFIED HYPERLIPIDEMIA TYPE: ICD-10-CM

## 2023-01-12 DIAGNOSIS — E11.49 TYPE II DIABETES MELLITUS WITH NEUROLOGICAL MANIFESTATIONS: ICD-10-CM

## 2023-01-12 DIAGNOSIS — U07.1 COVID-19: ICD-10-CM

## 2023-01-12 PROCEDURE — 99999 PR PBB SHADOW E&M-EST. PATIENT-LVL IV: CPT | Mod: PBBFAC,CR,, | Performed by: INTERNAL MEDICINE

## 2023-01-12 PROCEDURE — 99999 PR PBB SHADOW E&M-EST. PATIENT-LVL IV: ICD-10-PCS | Mod: PBBFAC,CR,, | Performed by: INTERNAL MEDICINE

## 2023-01-12 PROCEDURE — 99214 OFFICE O/P EST MOD 30 MIN: CPT | Mod: PBBFAC | Performed by: INTERNAL MEDICINE

## 2023-01-12 PROCEDURE — 99204 OFFICE O/P NEW MOD 45 MIN: CPT | Mod: S$PBB,CR,, | Performed by: INTERNAL MEDICINE

## 2023-01-12 PROCEDURE — 99204 PR OFFICE/OUTPT VISIT, NEW, LEVL IV, 45-59 MIN: ICD-10-PCS | Mod: S$PBB,CR,, | Performed by: INTERNAL MEDICINE

## 2023-01-12 RX ORDER — ESCITALOPRAM OXALATE 10 MG/1
10 TABLET ORAL DAILY
OUTPATIENT
Start: 2023-01-12

## 2023-01-12 NOTE — PROGRESS NOTES
Subjective:   Patient ID:  Rosy Balderrama is a 89 y.o. female who presents for evaluation of Establish Care      HPI  A 89 YO FEMALE WITH AFIB aortic stenosis HLP [facility] HAD LEFT LEG SURGERY AND HAD A PROLONGED STAY AT THE NURSING HOME WITH FAILURE OF SURGERY And has been non bearing ortho eval suggest she may need revision . She had swelling at the nursing home and had an ultrasound showing blood clot and dvt 1 months after discharge. She was placed on noac since September.  She still have hip pain  the leg swelling improved. She is on NOAC  Her dvt showed left popliteal vein and posterior tibial vein 10/19/2022  Past Medical History:   Diagnosis Date    *Atrial fibrillation     Allergy     Blood transfusion     Cataract     Diabetes mellitus type II     Hyperlipidemia     Osteoporosis        Past Surgical History:   Procedure Laterality Date    ANKLE SURGERY      broken ankle    BILATERAL SALPINGOOPHORECTOMY      BREAST BIOPSY      CARPAL TUNNEL RELEASE      CHOLECYSTECTOMY      COLONOSCOPY      ERCP      ESOPHAGOGASTRODUODENOSCOPY      FRACTURE SURGERY      KNEE CARTILAGE SURGERY      ORIF FEMUR FRACTURE Left 7/19/2022    Procedure: ORIF, FRACTURE, FEMUR;  Surgeon: Yulissa Marx MD;  Location: [facility];  Service: Orthopedics;  Laterality: Left;  Periprosthetic femur fracture    TOTAL ABDOMINAL HYSTERECTOMY      TOTAL HIP ARTHROPLASTY      TUBAL LIGATION         Social History     Tobacco Use    Smoking status: Never    Smokeless tobacco: Never   Substance Use Topics    Alcohol use: No    Drug use: No       Family History   Problem Relation Age of Onset    Cancer Mother     Cancer Sister     Diabetes Brother        Current Outpatient Medications   Medication Sig    AMINO ACIDS/MINERALS (A/G PRO ORAL) Take 2 tablets by mouth 2 (two) times a day.    ANTIOX#10/OM3/DHA/EPA/LUT/ZEAX (I-CAPS ORAL) Take 1 capsule by mouth once daily.    apixaban (ELIQUIS) 5 mg Tab Take 5 mg by mouth 2 (two) times daily.    aspirin 81  MG Chew Take 81 mg by mouth nightly.    bumetanide (BUMEX) 1 MG tablet Take 1 mg by mouth once daily.    CALCIUM CARBONATE/VITAMIN D3 (CALTRATE 600 + D ORAL) Take 1 tablet by mouth once daily.    cranberry fruit concentrate (AZO CRANBERRY ORAL) Take 1 tablet by mouth 2 (two) times a day.    EScitalopram oxalate (LEXAPRO) 10 MG tablet Take 10 mg by mouth once daily.    fexofenadine (ALLEGRA) 180 MG tablet Take 180 mg by mouth once daily.    memantine (NAMENDA) 10 MG Tab Take 10 mg by mouth 2 (two) times daily.    metoprolol tartrate (LOPRESSOR) 25 MG tablet Take 12.5 mg by mouth 2 (two) times daily.    MULTIVITAMIN ORAL Take 1 tablet by mouth once daily.    naproxen sodium (ANAPROX) 220 MG tablet Take 220 mg by mouth 2 (two) times daily with meals.    omega-3 fatty acids/fish oil (FISH OIL-OMEGA-3 FATTY ACIDS) 300-1,000 mg capsule Take 1 capsule by mouth once daily.    pravastatin (PRAVACHOL) 40 MG tablet Take 40 mg by mouth once daily.    QUEtiapine (SEROQUEL) 25 MG Tab Take 25 mg by mouth nightly.    raloxifene (EVISTA) 60 mg tablet Take 60 mg by mouth once daily.    sitagliptan (JANUVIA) 100 MG Tab Take 100 mg by mouth once daily.    traMADoL (ULTRAM) 50 mg tablet Take 50 mg by mouth every 8 (eight) hours as needed for Pain.    vitamin D (VITAMIN D3) 1000 units Tab Take 1,000 Units by mouth once daily.     No current facility-administered medications for this visit.     Current Outpatient Medications on File Prior to Visit   Medication Sig    AMINO ACIDS/MINERALS (A/G PRO ORAL) Take 2 tablets by mouth 2 (two) times a day.    ANTIOX#10/OM3/DHA/EPA/LUT/ZEAX (I-CAPS ORAL) Take 1 capsule by mouth once daily.    apixaban (ELIQUIS) 5 mg Tab Take 5 mg by mouth 2 (two) times daily.    aspirin 81 MG Chew Take 81 mg by mouth nightly.    bumetanide (BUMEX) 1 MG tablet Take 1 mg by mouth once daily.    CALCIUM CARBONATE/VITAMIN D3 (CALTRATE 600 + D ORAL) Take 1 tablet by mouth once daily.    cranberry fruit concentrate (AZO  CRANBERRY ORAL) Take 1 tablet by mouth 2 (two) times a day.    EScitalopram oxalate (LEXAPRO) 10 MG tablet Take 10 mg by mouth once daily.    fexofenadine (ALLEGRA) 180 MG tablet Take 180 mg by mouth once daily.    memantine (NAMENDA) 10 MG Tab Take 10 mg by mouth 2 (two) times daily.    metoprolol tartrate (LOPRESSOR) 25 MG tablet Take 12.5 mg by mouth 2 (two) times daily.    MULTIVITAMIN ORAL Take 1 tablet by mouth once daily.    naproxen sodium (ANAPROX) 220 MG tablet Take 220 mg by mouth 2 (two) times daily with meals.    omega-3 fatty acids/fish oil (FISH OIL-OMEGA-3 FATTY ACIDS) 300-1,000 mg capsule Take 1 capsule by mouth once daily.    pravastatin (PRAVACHOL) 40 MG tablet Take 40 mg by mouth once daily.    QUEtiapine (SEROQUEL) 25 MG Tab Take 25 mg by mouth nightly.    raloxifene (EVISTA) 60 mg tablet Take 60 mg by mouth once daily.    sitagliptan (JANUVIA) 100 MG Tab Take 100 mg by mouth once daily.    traMADoL (ULTRAM) 50 mg tablet Take 50 mg by mouth every 8 (eight) hours as needed for Pain.    vitamin D (VITAMIN D3) 1000 units Tab Take 1,000 Units by mouth once daily.     No current facility-administered medications on file prior to visit.       Review of patient's allergies indicates:   Allergen Reactions    Alendronate Other (See Comments)    Atorvastatin Other (See Comments)    Ciprofloxacin Other (See Comments)    Codeine Itching    Diazepam Itching    Ibuprofen Other (See Comments)    Nitrofurantoin monohyd/m-cryst     Oxycodone-acetaminophen Other (See Comments) and Itching    Rofecoxib Other (See Comments)    Seldane     Simvastatin Other (See Comments)     Review of patient's allergies indicates:   Allergen Reactions    Alendronate Other (See Comments)    Atorvastatin Other (See Comments)    Ciprofloxacin Other (See Comments)    Codeine Itching    Diazepam Itching    Ibuprofen Other (See Comments)    Nitrofurantoin monohyd/m-cryst     Oxycodone-acetaminophen Other (See Comments) and Itching     Rofecoxib Other (See Comments)    Seldane     Simvastatin Other (See Comments)      Review of Systems   Constitutional: Negative for diaphoresis, malaise/fatigue and weight gain.   HENT:  Negative for hoarse voice.    Eyes:  Negative for double vision and visual disturbance.   Cardiovascular:  Negative for chest pain, claudication, cyanosis, dyspnea on exertion, irregular heartbeat, leg swelling, near-syncope, orthopnea, palpitations, paroxysmal nocturnal dyspnea and syncope.   Respiratory:  Negative for cough, hemoptysis, shortness of breath and snoring.    Hematologic/Lymphatic: Negative for bleeding problem. Does not bruise/bleed easily.   Skin:  Negative for color change and poor wound healing.   Musculoskeletal:  Negative for muscle cramps, muscle weakness and myalgias.   Gastrointestinal:  Negative for bloating, abdominal pain, change in bowel habit, diarrhea, heartburn, hematemesis, hematochezia, melena and nausea.   Neurological:  Negative for excessive daytime sleepiness, dizziness, headaches, light-headedness, loss of balance, numbness and weakness.   Psychiatric/Behavioral:  Positive for memory loss. The patient does not have insomnia.    Allergic/Immunologic: Negative for hives.     Objective:   Physical Exam  Vitals and nursing note reviewed.   Constitutional:       General: She is not in acute distress.     Appearance: Normal appearance. She is well-developed. She is not ill-appearing.   HENT:      Head: Normocephalic and atraumatic.   Eyes:      General: No scleral icterus.     Pupils: Pupils are equal, round, and reactive to light.   Neck:      Thyroid: No thyromegaly.      Vascular: Normal carotid pulses. No carotid bruit, hepatojugular reflux or JVD.      Trachea: No tracheal deviation.   Cardiovascular:      Rate and Rhythm: Normal rate and regular rhythm.      Pulses: Normal pulses.           Carotid pulses are 2+ on the right side and 2+ on the left side.       Radial pulses are 2+ on the right  side and 2+ on the left side.        Dorsalis pedis pulses are 2+ on the right side and 2+ on the left side.        Posterior tibial pulses are 2+ on the right side and 2+ on the left side.      Heart sounds: Murmur heard.   Harsh midsystolic murmur is present with a grade of 2/6 at the upper right sternal border radiating to the neck.     No friction rub. No gallop.   Pulmonary:      Effort: Pulmonary effort is normal. No respiratory distress.      Breath sounds: Normal breath sounds. No wheezing, rhonchi or rales.   Chest:      Chest wall: No tenderness.   Abdominal:      General: Bowel sounds are normal. There is no abdominal bruit.      Palpations: Abdomen is soft. There is no hepatomegaly or pulsatile mass.      Tenderness: There is no abdominal tenderness.   Musculoskeletal:      Right shoulder: No deformity.      Cervical back: Normal range of motion and neck supple.   Skin:     General: Skin is warm and dry.      Findings: No erythema or rash.      Nails: There is no clubbing.   Neurological:      Mental Status: She is alert.      Cranial Nerves: No cranial nerve deficit.      Coordination: Coordination normal.   Psychiatric:         Speech: Speech normal.     Vitals:    01/12/23 1604 01/12/23 1605   BP: 120/70 124/80   BP Location: Left arm Right arm   Patient Position: Sitting Sitting   BP Method: Large (Manual) Large (Manual)   Pulse: 67    SpO2: 99%    Height: 5' (1.524 m)      Lab Results   Component Value Date    CHOL 133 11/17/2022     Lab Results   Component Value Date    HDL 31 (L) 11/17/2022     Lab Results   Component Value Date    LDLCALC 78.4 11/17/2022     Lab Results   Component Value Date    TRIG 118 11/17/2022     Lab Results   Component Value Date    CHOLHDL 23.3 11/17/2022       Chemistry        Component Value Date/Time     08/17/2022 1509    K 4.7 08/17/2022 1509     08/17/2022 1509    CO2 24 08/17/2022 1509    BUN 19 08/17/2022 1509    CREATININE 0.9 08/17/2022 1509    GLU  108 08/17/2022 1509        Component Value Date/Time    CALCIUM 9.5 08/17/2022 1509    ALKPHOS 104 08/17/2022 1509    AST 20 08/17/2022 1509    ALT 15 08/17/2022 1509    BILITOT 0.5 08/17/2022 1509    ESTGFRAFRICA >60 07/26/2022 1722    EGFRNONAA >60 07/26/2022 1722          Lab Results   Component Value Date    TSH 4.220 (H) 11/29/2022     Lab Results   Component Value Date    INR 1.0 07/25/2022    INR 1.0 08/15/2012     Lab Results   Component Value Date    WBC 4.70 11/17/2022    HGB 8.4 (L) 11/17/2022    HCT 26.2 (L) 11/17/2022     (H) 11/17/2022     11/17/2022     BNP  @LABRCNTIP(BNP,BNPTRIAGEBLO)@  CrCl cannot be calculated (Patient's most recent lab result is older than the maximum 7 days allowed.).  Assessment:     1. Paroxysmal atrial fibrillation    2. Acute deep vein thrombosis (DVT) of distal vein of left lower extremity    3. Hyperlipidemia, unspecified hyperlipidemia type    4. COVID-19    5. Controlled type 2 diabetes mellitus without complication, without long-term current use of insulin    6. Type II diabetes mellitus with neurological manifestations      Discussed the case with her cardiologist DR BARRERA.She had a dvt popliteal on the left in October she is on eliquis she has remote afib and was not previously on eliquis. She will need to be treated for 3 months and will plan on repeat venous duplex to see if it resolves then will discontinue eliquis aND PLACE ON ASA .  HER OTHER risk factor are being attended to by DR BARRERA. HER AS WAS MILD 2 YEARS AGO.   Plan:   Venous duplex left leg   Phone review and decide f/u

## 2023-01-13 ENCOUNTER — TELEPHONE (OUTPATIENT)
Dept: CARDIOLOGY | Facility: HOSPITAL | Age: 88
End: 2023-01-13
Payer: MEDICARE

## 2023-02-17 DIAGNOSIS — M89.8X5 PAIN OF LEFT FEMUR: Primary | ICD-10-CM

## 2023-02-17 DIAGNOSIS — R10.2 PAIN IN PELVIS: ICD-10-CM

## 2023-02-20 ENCOUNTER — HOSPITAL ENCOUNTER (OUTPATIENT)
Dept: RADIOLOGY | Facility: HOSPITAL | Age: 88
Discharge: HOME | End: 2023-02-20
Attending: INTERNAL MEDICINE
Payer: MEDICARE

## 2023-02-20 ENCOUNTER — HOSPITAL ENCOUNTER (OUTPATIENT)
Dept: RADIOLOGY | Facility: HOSPITAL | Age: 88
Discharge: HOME | End: 2023-02-20
Attending: PHYSICIAN ASSISTANT
Payer: MEDICARE

## 2023-02-20 ENCOUNTER — OFFICE VISIT (OUTPATIENT)
Dept: ORTHOPEDICS | Facility: CLINIC | Age: 88
End: 2023-02-20
Payer: MEDICARE

## 2023-02-20 ENCOUNTER — HOSPITAL ENCOUNTER (OUTPATIENT)
Dept: RADIOLOGY | Facility: HOSPITAL | Age: 88
Discharge: HOME | End: 2023-02-20
Attending: ORTHOPAEDIC SURGERY
Payer: MEDICARE

## 2023-02-20 VITALS
WEIGHT: 166 LBS | SYSTOLIC BLOOD PRESSURE: 122 MMHG | HEIGHT: 60 IN | DIASTOLIC BLOOD PRESSURE: 61 MMHG | BODY MASS INDEX: 32.59 KG/M2

## 2023-02-20 DIAGNOSIS — R10.2 PAIN IN PELVIS: ICD-10-CM

## 2023-02-20 DIAGNOSIS — I82.532 CHRONIC EMBOLISM AND THROMBOSIS OF LEFT POPLITEAL VEIN: ICD-10-CM

## 2023-02-20 DIAGNOSIS — R10.2 PAIN IN PELVIS: Primary | ICD-10-CM

## 2023-02-20 DIAGNOSIS — M89.8X5 PAIN OF LEFT FEMUR: ICD-10-CM

## 2023-02-20 DIAGNOSIS — Z96.649 PERIPROSTHETIC FRACTURE OF FEMUR FOLLOWING TOTAL REPLACEMENT OF HIP, SEQUELA: Primary | ICD-10-CM

## 2023-02-20 DIAGNOSIS — M97.8XXS PERIPROSTHETIC FRACTURE OF FEMUR FOLLOWING TOTAL REPLACEMENT OF HIP, SEQUELA: Primary | ICD-10-CM

## 2023-02-20 DIAGNOSIS — M80.00XG AGE-RELATED OSTEOPOROSIS WITH CURRENT PATHOLOGICAL FRACTURE WITH DELAYED HEALING, SUBSEQUENT ENCOUNTER: ICD-10-CM

## 2023-02-20 DIAGNOSIS — I82.4Z2 ACUTE DEEP VEIN THROMBOSIS (DVT) OF DISTAL VEIN OF LEFT LOWER EXTREMITY: ICD-10-CM

## 2023-02-20 PROCEDURE — 99213 OFFICE O/P EST LOW 20 MIN: CPT | Mod: S$PBB,,, | Performed by: ORTHOPAEDIC SURGERY

## 2023-02-20 PROCEDURE — 72170 X-RAY EXAM OF PELVIS: CPT | Mod: 26,,, | Performed by: RADIOLOGY

## 2023-02-20 PROCEDURE — 93971 EXTREMITY STUDY: CPT | Mod: 26,LT,, | Performed by: STUDENT IN AN ORGANIZED HEALTH CARE EDUCATION/TRAINING PROGRAM

## 2023-02-20 PROCEDURE — 93971 EXTREMITY STUDY: CPT | Mod: TC,LT

## 2023-02-20 PROCEDURE — 99999 PR PBB SHADOW E&M-EST. PATIENT-LVL IV: ICD-10-PCS | Mod: PBBFAC,,, | Performed by: ORTHOPAEDIC SURGERY

## 2023-02-20 PROCEDURE — 73552 X-RAY EXAM OF FEMUR 2/>: CPT | Mod: TC,LT

## 2023-02-20 PROCEDURE — 99213 PR OFFICE/OUTPT VISIT, EST, LEVL III, 20-29 MIN: ICD-10-PCS | Mod: S$PBB,,, | Performed by: ORTHOPAEDIC SURGERY

## 2023-02-20 PROCEDURE — 73552 XR FEMUR 2 VIEW LEFT: ICD-10-PCS | Mod: 26,LT,, | Performed by: RADIOLOGY

## 2023-02-20 PROCEDURE — 72170 XR PELVIS ROUTINE AP: ICD-10-PCS | Mod: 26,,, | Performed by: RADIOLOGY

## 2023-02-20 PROCEDURE — 72170 X-RAY EXAM OF PELVIS: CPT | Mod: TC

## 2023-02-20 PROCEDURE — 73552 X-RAY EXAM OF FEMUR 2/>: CPT | Mod: 26,LT,, | Performed by: RADIOLOGY

## 2023-02-20 PROCEDURE — 99214 OFFICE O/P EST MOD 30 MIN: CPT | Mod: PBBFAC,25 | Performed by: ORTHOPAEDIC SURGERY

## 2023-02-20 PROCEDURE — 93971 US LOWER EXTREMITY VEINS LEFT: ICD-10-PCS | Mod: 26,LT,, | Performed by: STUDENT IN AN ORGANIZED HEALTH CARE EDUCATION/TRAINING PROGRAM

## 2023-02-20 PROCEDURE — 99999 PR PBB SHADOW E&M-EST. PATIENT-LVL IV: CPT | Mod: PBBFAC,,, | Performed by: ORTHOPAEDIC SURGERY

## 2023-02-21 ENCOUNTER — TELEPHONE (OUTPATIENT)
Dept: CARDIOLOGY | Facility: CLINIC | Age: 88
End: 2023-02-21
Payer: MEDICARE

## 2023-02-21 NOTE — TELEPHONE ENCOUNTER
Pt daughter in law notified and she verbalized understanding pb    ----- Message from Nathalie Blakely MD sent at 2/20/2023  5:55 PM CST -----  NO BLOOD CLOTS IN LEG

## 2023-02-23 NOTE — PROGRESS NOTES
Patient ID: Rosy Balderrama is a 89 y.o. female.    Chief Complaint: Pain and Injury of the Left Femur      HPI: Rosy Balderrama is a very pleasant 89 y.o.  female who is here for follow-up of her left periprosthetic femur fracture.  She is 7 months status post operative fixation of her periprosthetic left femur fracture performed by me on July 19th 2022. The patient was last seen by Nahum David PA-C on January 9, 2023.  At that time patient had been non weight-bearing since October 24, 2022 and continues to be nonweightbearing.  Today, the patient's pain is a 8 / 10 .  She is accompanied by her son who is her primary health advocate.    Past Medical History:   Diagnosis Date    *Atrial fibrillation     Allergy     Blood transfusion     Cataract     Diabetes mellitus type II     Hyperlipidemia     Osteoporosis      Past Surgical History:   Procedure Laterality Date    ANKLE SURGERY      broken ankle    BILATERAL SALPINGOOPHORECTOMY      BREAST BIOPSY      CARPAL TUNNEL RELEASE      CHOLECYSTECTOMY      COLONOSCOPY      ERCP      ESOPHAGOGASTRODUODENOSCOPY      FRACTURE SURGERY      KNEE CARTILAGE SURGERY      ORIF FEMUR FRACTURE Left 7/19/2022    Procedure: ORIF, FRACTURE, FEMUR;  Surgeon: Yulissa Marx MD;  Location: [facility];  Service: Orthopedics;  Laterality: Left;  Periprosthetic femur fracture    TOTAL ABDOMINAL HYSTERECTOMY      TOTAL HIP ARTHROPLASTY      TUBAL LIGATION       Family History   Problem Relation Age of Onset    Cancer Mother     Cancer Sister     Diabetes Brother      Social History     Socioeconomic History    Marital status:    Occupational History    Occupation: Retired/Housewife     Comment: Housewife   Tobacco Use    Smoking status: Never    Smokeless tobacco: Never   Substance and Sexual Activity    Alcohol use: No    Drug use: No   Social History Narrative    Patient is a housewife.     Medication List with Changes/Refills   Current Medications    AMINO ACIDS/MINERALS (A/G  PRO ORAL)    Take 2 tablets by mouth 2 (two) times a day.    ANTIOX#10/OM3/DHA/EPA/LUT/ZEAX (I-CAPS ORAL)    Take 1 capsule by mouth once daily.    APIXABAN (ELIQUIS) 5 MG TAB    Take 5 mg by mouth 2 (two) times daily.    ASPIRIN 81 MG CHEW    Take 81 mg by mouth nightly.    BUMETANIDE (BUMEX) 1 MG TABLET    Take 1 mg by mouth once daily.    CALCIUM CARBONATE/VITAMIN D3 (CALTRATE 600 + D ORAL)    Take 1 tablet by mouth once daily.    CRANBERRY FRUIT CONCENTRATE (AZO CRANBERRY ORAL)    Take 1 tablet by mouth 2 (two) times a day.    ESCITALOPRAM OXALATE (LEXAPRO) 10 MG TABLET    Take 10 mg by mouth once daily.    FEXOFENADINE (ALLEGRA) 180 MG TABLET    Take 180 mg by mouth once daily.    MEMANTINE (NAMENDA) 10 MG TAB    Take 10 mg by mouth 2 (two) times daily.    METOPROLOL TARTRATE (LOPRESSOR) 25 MG TABLET    Take 12.5 mg by mouth 2 (two) times daily.    MULTIVITAMIN ORAL    Take 1 tablet by mouth once daily.    NAPROXEN SODIUM (ANAPROX) 220 MG TABLET    Take 220 mg by mouth 2 (two) times daily with meals.    OMEGA-3 FATTY ACIDS/FISH OIL (FISH OIL-OMEGA-3 FATTY ACIDS) 300-1,000 MG CAPSULE    Take 1 capsule by mouth once daily.    PRAVASTATIN (PRAVACHOL) 40 MG TABLET    Take 40 mg by mouth once daily.    QUETIAPINE (SEROQUEL) 25 MG TAB    Take 25 mg by mouth nightly.    RALOXIFENE (EVISTA) 60 MG TABLET    Take 60 mg by mouth once daily.    SITAGLIPTAN (JANUVIA) 100 MG TAB    Take 100 mg by mouth once daily.    TRAMADOL (ULTRAM) 50 MG TABLET    Take 50 mg by mouth every 8 (eight) hours as needed for Pain.    VITAMIN D (VITAMIN D3) 1000 UNITS TAB    Take 1,000 Units by mouth once daily.     Review of patient's allergies indicates:   Allergen Reactions    Alendronate Other (See Comments)    Atorvastatin Other (See Comments)    Ciprofloxacin Other (See Comments)    Codeine Itching    Diazepam Itching    Ibuprofen Other (See Comments)    Nitrofurantoin monohyd/m-cryst     Oxycodone-acetaminophen Other (See Comments) and  Itching    Rofecoxib Other (See Comments)    Seldane     Simvastatin Other (See Comments)       Physical Exam:     Wt Readings from Last 3 Encounters:   02/20/23 75.3 kg (166 lb)   01/09/23 75.3 kg (166 lb)   12/12/22 75.3 kg (166 lb 0.1 oz)     Temp Readings from Last 3 Encounters:   11/23/22 98.1 °F (36.7 °C)   08/17/22 98.9 °F (37.2 °C) (Oral)   07/27/22 98.9 °F (37.2 °C) (Oral)     BP Readings from Last 3 Encounters:   02/20/23 122/61   01/12/23 124/80   01/09/23 (!) 91/52     Pulse Readings from Last 3 Encounters:   01/12/23 67   01/09/23 (!) 58   12/12/22 (!) 51       Body mass index is 32.42 kg/m².      General Appearance:   cooperative, no acute distress, appropriate for age; flat affect                    Neurologic:  Alert and oriented x3                            Pysch:  Appropriate mood and affect                              Head:  Normocephalic, atraumatic                             EENT:  EOM grossly intact, no icterus, moist mucous membranes, fair dentition                              Back:  Symmetrical, no curvature, ROM normal, no CVA tenderness                   Respiratory:  non-labored; no audible wheezing                      Abdomen:  non-distended           Musculoskeletal:  Patient seen examined in the wheelchair.  Mild discomfort with range of motion of left hip               Skin/Hair/Nails:  Skin warm, dry, and intact, no rashes or abnormal dyspigmentation     IMAGING:  Left lower extremity demonstrate varus deformity the level of the fracture site with displacement of the hardware however this is unchanged since her prior visit.  She does have bony callus at the fracture site noted.    Assessment:       Encounter Diagnoses   Name Primary?    Periprosthetic fracture of femur following total replacement of hip, sequela Yes    Age-related osteoporosis with current pathological fracture with delayed healing, subsequent encounter           Plan:       Rosy was seen today for pain and  injury.    Diagnoses and all orders for this visit:    Periprosthetic fracture of femur following total replacement of hip, sequela    Age-related osteoporosis with current pathological fracture with delayed healing, subsequent encounter      I had an extended conversation with the patient and her son about treatment options moving forward.  We discussed given her age reasonable nature of continuing nonoperative management to see if she can continue to heal and avoid surgery.  I did explain to him the very concerned if she puts a lot a weight on her leg with the current alignment of her femur that she may fracture again.  I also discussed with them possible surgical options to revise her current construct.  At this time the patient nor the son are interested in further surgical intervention.  We will continue to watch her closely with serial x-rays.  We will see her back in clinic in the next 4-6 weeks for recheck.    Follow up in about 6 weeks (around 4/3/2023).    The patient understands, chooses and consents to this plan and accepts all   the risks which include but are not limited to the risks mentioned above.     Disclaimer: This note was prepared using a voice recognition system and is likely to have sound alike errors within the text.         Yulissa Marx MD, ALICIA, FAAOS  Orthopaedic Surgeon

## 2023-03-31 DIAGNOSIS — R10.2 PAIN IN PELVIS: Primary | ICD-10-CM

## 2023-04-03 ENCOUNTER — OFFICE VISIT (OUTPATIENT)
Dept: ORTHOPEDICS | Facility: CLINIC | Age: 88
End: 2023-04-03
Payer: MEDICARE

## 2023-04-03 ENCOUNTER — HOSPITAL ENCOUNTER (OUTPATIENT)
Dept: RADIOLOGY | Facility: HOSPITAL | Age: 88
Discharge: HOME | End: 2023-04-03
Attending: ORTHOPAEDIC SURGERY
Payer: MEDICARE

## 2023-04-03 VITALS
HEART RATE: 54 BPM | SYSTOLIC BLOOD PRESSURE: 126 MMHG | BODY MASS INDEX: 32.59 KG/M2 | WEIGHT: 166 LBS | HEIGHT: 60 IN | DIASTOLIC BLOOD PRESSURE: 80 MMHG

## 2023-04-03 DIAGNOSIS — M97.8XXS PERIPROSTHETIC FRACTURE OF FEMUR FOLLOWING TOTAL REPLACEMENT OF HIP, SEQUELA: Primary | ICD-10-CM

## 2023-04-03 DIAGNOSIS — Z96.649 PERIPROSTHETIC FRACTURE OF FEMUR FOLLOWING TOTAL REPLACEMENT OF HIP, SEQUELA: Primary | ICD-10-CM

## 2023-04-03 DIAGNOSIS — M85.852 OTHER SPECIFIED DISORDERS OF BONE DENSITY AND STRUCTURE, LEFT THIGH: ICD-10-CM

## 2023-04-03 DIAGNOSIS — M89.8X5 PAIN OF LEFT FEMUR: Primary | ICD-10-CM

## 2023-04-03 DIAGNOSIS — M80.00XG AGE-RELATED OSTEOPOROSIS WITH CURRENT PATHOLOGICAL FRACTURE WITH DELAYED HEALING, SUBSEQUENT ENCOUNTER: ICD-10-CM

## 2023-04-03 DIAGNOSIS — R10.2 PAIN IN PELVIS: ICD-10-CM

## 2023-04-03 PROCEDURE — 73552 X-RAY EXAM OF FEMUR 2/>: CPT | Mod: TC,LT

## 2023-04-03 PROCEDURE — 72170 XR PELVIS ROUTINE AP: ICD-10-PCS | Mod: 26,,, | Performed by: RADIOLOGY

## 2023-04-03 PROCEDURE — 99999 PR PBB SHADOW E&M-EST. PATIENT-LVL V: CPT | Mod: PBBFAC,,, | Performed by: PHYSICIAN ASSISTANT

## 2023-04-03 PROCEDURE — 72170 X-RAY EXAM OF PELVIS: CPT | Mod: 26,,, | Performed by: RADIOLOGY

## 2023-04-03 PROCEDURE — 99215 OFFICE O/P EST HI 40 MIN: CPT | Mod: PBBFAC | Performed by: PHYSICIAN ASSISTANT

## 2023-04-03 PROCEDURE — 99214 PR OFFICE/OUTPT VISIT, EST, LEVL IV, 30-39 MIN: ICD-10-PCS | Mod: S$PBB,,, | Performed by: PHYSICIAN ASSISTANT

## 2023-04-03 PROCEDURE — 99214 OFFICE O/P EST MOD 30 MIN: CPT | Mod: S$PBB,,, | Performed by: PHYSICIAN ASSISTANT

## 2023-04-03 PROCEDURE — 73552 XR FEMUR 2 VIEW LEFT: ICD-10-PCS | Mod: 26,LT,, | Performed by: RADIOLOGY

## 2023-04-03 PROCEDURE — 99999 PR PBB SHADOW E&M-EST. PATIENT-LVL V: ICD-10-PCS | Mod: PBBFAC,,, | Performed by: PHYSICIAN ASSISTANT

## 2023-04-03 PROCEDURE — 73552 X-RAY EXAM OF FEMUR 2/>: CPT | Mod: 26,LT,, | Performed by: RADIOLOGY

## 2023-04-03 PROCEDURE — 72170 X-RAY EXAM OF PELVIS: CPT | Mod: TC

## 2023-04-04 NOTE — PROGRESS NOTES
Subjective:      Patient ID: Rosy Balderrama is a 89 y.o. female.    Chief Complaint: No chief complaint on file.      HPI: Rosy Balderrama is a 89-year-old female in clinic today for postoperative follow-up.  Patient is 8-1/2 months status post ORIF of left periprosthetic femur fracture.  Patient has been nonweightbearing since 10/24/2022.  She has been doing home exercises for ROM and strengthening of the left lower extremity.  She denies numbness or tingling in the extremity.  She denies worsening pain.    Past Medical History:   Diagnosis Date    *Atrial fibrillation     Allergy     Blood transfusion     Cataract     Diabetes mellitus type II     Hyperlipidemia     Osteoporosis        Current Outpatient Medications:     AMINO ACIDS/MINERALS (A/G PRO ORAL), Take 2 tablets by mouth 2 (two) times a day., Disp: , Rfl:     ANTIOX#10/OM3/DHA/EPA/LUT/ZEAX (I-CAPS ORAL), Take 1 capsule by mouth once daily., Disp: , Rfl:     apixaban (ELIQUIS) 5 mg Tab, Take 5 mg by mouth 2 (two) times daily., Disp: , Rfl:     aspirin 81 MG Chew, Take 81 mg by mouth nightly., Disp: , Rfl:     bumetanide (BUMEX) 1 MG tablet, Take 1 mg by mouth once daily., Disp: , Rfl:     CALCIUM CARBONATE/VITAMIN D3 (CALTRATE 600 + D ORAL), Take 1 tablet by mouth once daily., Disp: , Rfl:     cranberry fruit concentrate (AZO CRANBERRY ORAL), Take 1 tablet by mouth 2 (two) times a day., Disp: , Rfl:     EScitalopram oxalate (LEXAPRO) 10 MG tablet, Take 10 mg by mouth once daily., Disp: , Rfl:     fexofenadine (ALLEGRA) 180 MG tablet, Take 180 mg by mouth once daily., Disp: , Rfl:     memantine (NAMENDA) 10 MG Tab, Take 10 mg by mouth 2 (two) times daily., Disp: , Rfl:     metoprolol tartrate (LOPRESSOR) 25 MG tablet, Take 12.5 mg by mouth 2 (two) times daily., Disp: , Rfl:     MULTIVITAMIN ORAL, Take 1 tablet by mouth once daily., Disp: , Rfl:     naproxen sodium (ANAPROX) 220 MG tablet, Take 220 mg by mouth 2 (two) times daily with meals., Disp: , Rfl:      omega-3 fatty acids/fish oil (FISH OIL-OMEGA-3 FATTY ACIDS) 300-1,000 mg capsule, Take 1 capsule by mouth once daily., Disp: , Rfl:     pravastatin (PRAVACHOL) 40 MG tablet, Take 40 mg by mouth once daily., Disp: , Rfl:     QUEtiapine (SEROQUEL) 25 MG Tab, Take 25 mg by mouth nightly., Disp: , Rfl:     raloxifene (EVISTA) 60 mg tablet, Take 60 mg by mouth once daily., Disp: , Rfl:     sitagliptan (JANUVIA) 100 MG Tab, Take 100 mg by mouth once daily., Disp: , Rfl:     traMADoL (ULTRAM) 50 mg tablet, Take 50 mg by mouth every 8 (eight) hours as needed for Pain., Disp: , Rfl:     vitamin D (VITAMIN D3) 1000 units Tab, Take 1,000 Units by mouth once daily., Disp: , Rfl:   Review of patient's allergies indicates:   Allergen Reactions    Alendronate Other (See Comments)    Atorvastatin Other (See Comments)    Ciprofloxacin Other (See Comments)    Codeine Itching    Diazepam Itching    Ibuprofen Other (See Comments)    Nitrofurantoin monohyd/m-cryst     Oxycodone-acetaminophen Other (See Comments) and Itching    Rofecoxib Other (See Comments)    Seldane     Simvastatin Other (See Comments)       /80   Pulse (!) 54   Ht 5' (1.524 m)   Wt 75.3 kg (166 lb)   BMI 32.42 kg/m²     ROS      Objective:    Ortho Exam   Left lower extremity:  Incision well healed, no signs of infection   No edema  No TTP   No pain with internal/external rotation  Knee ROM full  Patient is able to perform a full extension of the knee against gravity, but straight leg raise is difficult due to weakness   Calf and compartments are soft and compressible  Motor exam normal   Sensation and pulses intact    GEN: Well developed, well nourished female. AAOX3. No acute distress.   Head: Normocephalic, atraumatic.   Eyes: MARY  Neck: Trachea is midline, no adenopathy  Resp: Breathing unlabored.  Neuro: Motor function normal, Cranial nerves intact  Psych: Mood and affect appropriate.       Assessment:     Imaging:  X-ray of the left femur shows  prosthesis and hardware in similar alignment to previous films with proximal portion of the lateral femoral plate not fully down on the bone.  There is still incomplete healing at the fracture site near the distal aspect of the femoral stem.  No interval detrimental changes.        1. 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 Age-related osteoporosis with current pathological fracture with delayed healing, subsequent encounter    3. Other specified disorders of bone density and structure, left thigh          Plan:       Reviewed the radiographs with the patient and her son.  Recommended we check labs for calcium, vitamin-D, and parathyroid hormone as well as get a bone density scan to further evaluate for the patient's bone health and ability to heal this fracture properly.  [facility] sent.  I have also ordered to restart the home health for this patient so that we can work on strengthening so that when we allow her to weightbear she possesses the strength to do so.  Patient was cautioned on fall avoidance.  She understands that she is to remain nonweightbearing at this time.  We will see her back in about 6 weeks for repeat radiographs and further evaluation.    Orders Placed This Encounter    DXA Bone Density Axial Skeleton 1 or more sites    CALCIUM    Vitamin D    PTH, intact    Ambulatory referral/consult to [facility]    SUBSEQUENT HOME HEALTH ORDERS     Follow up in about 6 weeks (around 5/15/2023).          Patient note was created using MModal Dictation.  Any errors in syntax or even information may not have been identified and edited on initial review prior to signing this note.

## 2023-05-04 ENCOUNTER — TELEPHONE (OUTPATIENT)
Dept: RHEUMATOLOGY | Facility: CLINIC | Age: 88
End: 2023-05-04
Payer: MEDICARE

## 2023-05-04 NOTE — TELEPHONE ENCOUNTER
"Attempted to contact pt daughter regarding pt upcoming appt w/ Thalia. As of 5/1/23, provider is full time at [facility] and is no longer seeing pt's at [facility].    Lvm   "

## 2023-05-16 NOTE — PROGRESS NOTES
RHEUMATOLOGY OUTPATIENT CLINIC NOTE    05/17/2023    Subjective:       Patient ID: Rosy Balderrama is a 89 y.o. female.    Chief Complaint: Osteoporosis      HPI       Rosy Bladerrama is a 89 y.o. pleasant female here for rheumatology initial evaluation for osteoporosis.  Her son and daughter joint her in clinic and help provide most of the history.      DEXA scan today of her forearm.  Unable to do lumbar spine and hip due to know where lift here.  Takes vitamin-D 1000 units daily.  She is in a wheelchair, transfers via lift.  Very limited activity level.  Prior fracture left femur.  Unsure if she fell at that time or not.    Fosamax listed as allergy, patient does not recall taking this medication    Rheumatologic review of systems negative otherwise.     Physical Exam:  Patient is in a wheelchair.  She does not know the answer to many questions.  Family helps provide most of the questions of her history.        Past Medical History:   Diagnosis Date    *Atrial fibrillation     Allergy     Blood transfusion     Cataract     Diabetes mellitus type II     Hyperlipidemia     Osteoporosis      Past Surgical History:   Procedure Laterality Date    ANKLE SURGERY      broken ankle    BILATERAL SALPINGOOPHORECTOMY      BREAST BIOPSY      CARPAL TUNNEL RELEASE      CHOLECYSTECTOMY      COLONOSCOPY      ERCP      ESOPHAGOGASTRODUODENOSCOPY      FRACTURE SURGERY      KNEE CARTILAGE SURGERY      ORIF FEMUR FRACTURE Left 7/19/2022    Procedure: ORIF, FRACTURE, FEMUR;  Surgeon: Yulissa Marx MD;  Location: [facility];  Service: Orthopedics;  Laterality: Left;  Periprosthetic femur fracture    TOTAL ABDOMINAL HYSTERECTOMY      TOTAL HIP ARTHROPLASTY      TUBAL LIGATION       Family History   Problem Relation Age of Onset    Cancer Mother     Cancer Sister     Diabetes Brother      Social History     Socioeconomic History    Marital status:    Occupational History    Occupation: Retired/Housewife     Comment: Housewife    Tobacco Use    Smoking status: Never    Smokeless tobacco: Never   Substance and Sexual Activity    Alcohol use: No    Drug use: No   Social History Narrative    Patient is a housewife.     Review of patient's allergies indicates:   Allergen Reactions    Alendronate Other (See Comments)    Atorvastatin Other (See Comments)    Ciprofloxacin Other (See Comments)    Codeine Itching    Diazepam Itching    Ibuprofen Other (See Comments)    Nitrofurantoin monohyd/m-cryst     Oxycodone-acetaminophen Other (See Comments) and Itching    Rofecoxib Other (See Comments)    Seldane     Simvastatin Other (See Comments)           Objective:       /62 (BP Location: Right arm, Patient Position: Sitting, BP Method: Large (Automatic))   Pulse 61   Resp 16   Ht 5' (1.524 m)   Wt 75.3 kg (166 lb 0.1 oz)   BMI 32.42 kg/m²         There is no immunization history on file for this patient.          No results found for this or any previous visit (from the past 672 hour(s)).     No results found for: TBGOLDPLUS   No results found for: HAV, HEPAIGM, HEPBIGM, HEPBCAB, HBEAG, HEPCAB       DEXA 05/17/2023:   Radius UD-1.5, radius 33% -3.0, radius total -2.1    Assessment:       1. Counseling on health promotion and disease prevention    2. Periprosthetic fracture of femur following total replacement of hip, sequela    3. Age-related osteoporosis with current pathological fracture with delayed healing, subsequent encounter    4. Other specified disorders of bone density and structure, left thigh          Impression:     Osteoporosis:  Osteoporosis on DEXA 05/17/2023 forearm.  Recent fragility fracture left femur around 07/2022.  Limited exercise inactivity.  History of Fosamax per chart review, listed as allergy, unsure allergy response.    Left femur fracture:   Prior hip replacement surgeries.  Left femur fracture within the last year.  Following with Orthopedics.    Counseling on health promotion and disease prevention  Over 10  minutes spent regarding below topics:  - Nutrition and exercise counseling.  - Medication counseling provided.    - Discussed osteoporosis/low bone density disease state in detail with patient.  Reviewed treatment options along with potential side effects of these treatments.  Dexa scan was reviewed with patient.  Treatment plan agreed upon together with patient      Plan:             Osteoporosis:  Patient would benefit from anabolic   Risk factors:  Prior left femur fracture within last year   T-score -3.0 on forearm DEXA  Decreased mobility  Prior reported allergy to alendronate  Pending insurance approval, start Forteo daily injections.  Discussed side effects and risks of medication with patient.  PTH level normal.  Patient's daughter-in-law will be able to administer medication.  Forteo is showing as insurance preferred over Tymlos.  Also may consider Evenity.  May be difficult to get to the clinic monthly given patient's decreased mobility  If not covered, consider Prolia therapy given prior reported allergy to Fosamax.  Repeat DEXA 05/2024    Fracture:   Continue following with Orthopedics    Follow up 3-4 months with CMP    Advised patient to let me know if any side effects of medication in the meantime    Thalia Vasquez PA-C  [facility]  Rheumatology       45 minutes of total time spent on the encounter, which includes face to face time and non-face to face time preparing to see the patient (eg, review of tests), Obtaining and/or reviewing separately obtained history, Documenting clinical information in the electronic or other health record, Independently interpreting results (not separately reported) and communicating results to the patient/family/caregiver, or Care coordination (not separately reported).       Disclaimer: This note was prepared using voice recognition system and is likely to have sound alike errors and is not proof read.  Please call me with any  questions

## 2023-05-17 ENCOUNTER — HOSPITAL ENCOUNTER (OUTPATIENT)
Dept: RADIOLOGY | Facility: HOSPITAL | Age: 88
Discharge: HOME | End: 2023-05-17
Attending: PHYSICIAN ASSISTANT
Payer: MEDICARE

## 2023-05-17 ENCOUNTER — OFFICE VISIT (OUTPATIENT)
Dept: RHEUMATOLOGY | Facility: CLINIC | Age: 88
End: 2023-05-17
Payer: MEDICARE

## 2023-05-17 ENCOUNTER — OFFICE VISIT (OUTPATIENT)
Dept: ORTHOPEDICS | Facility: CLINIC | Age: 88
End: 2023-05-17
Payer: MEDICARE

## 2023-05-17 VITALS
BODY MASS INDEX: 32.59 KG/M2 | RESPIRATION RATE: 16 BRPM | HEIGHT: 60 IN | SYSTOLIC BLOOD PRESSURE: 137 MMHG | DIASTOLIC BLOOD PRESSURE: 62 MMHG | WEIGHT: 166 LBS | HEART RATE: 61 BPM

## 2023-05-17 VITALS
HEART RATE: 66 BPM | DIASTOLIC BLOOD PRESSURE: 61 MMHG | WEIGHT: 166 LBS | BODY MASS INDEX: 32.59 KG/M2 | SYSTOLIC BLOOD PRESSURE: 124 MMHG | HEIGHT: 60 IN

## 2023-05-17 DIAGNOSIS — M97.8XXD PERIPROSTHETIC FRACTURE OF FEMUR FOLLOWING TOTAL REPLACEMENT OF HIP, SUBSEQUENT ENCOUNTER: Primary | ICD-10-CM

## 2023-05-17 DIAGNOSIS — M97.8XXS PERIPROSTHETIC FRACTURE OF FEMUR FOLLOWING TOTAL REPLACEMENT OF HIP, SEQUELA: ICD-10-CM

## 2023-05-17 DIAGNOSIS — M89.8X5 PAIN OF LEFT FEMUR: Primary | ICD-10-CM

## 2023-05-17 DIAGNOSIS — M80.052K: ICD-10-CM

## 2023-05-17 DIAGNOSIS — M89.8X5 PAIN OF LEFT FEMUR: ICD-10-CM

## 2023-05-17 DIAGNOSIS — M85.852 OTHER SPECIFIED DISORDERS OF BONE DENSITY AND STRUCTURE, LEFT THIGH: ICD-10-CM

## 2023-05-17 DIAGNOSIS — Z71.89 COUNSELING ON HEALTH PROMOTION AND DISEASE PREVENTION: Primary | ICD-10-CM

## 2023-05-17 DIAGNOSIS — Z96.649 PERIPROSTHETIC FRACTURE OF FEMUR FOLLOWING TOTAL REPLACEMENT OF HIP, SUBSEQUENT ENCOUNTER: Primary | ICD-10-CM

## 2023-05-17 DIAGNOSIS — Z96.649 PERIPROSTHETIC FRACTURE OF FEMUR FOLLOWING TOTAL REPLACEMENT OF HIP, SEQUELA: ICD-10-CM

## 2023-05-17 DIAGNOSIS — M80.00XG AGE-RELATED OSTEOPOROSIS WITH CURRENT PATHOLOGICAL FRACTURE WITH DELAYED HEALING, SUBSEQUENT ENCOUNTER: ICD-10-CM

## 2023-05-17 PROCEDURE — 73552 X-RAY EXAM OF FEMUR 2/>: CPT | Mod: 26,LT,, | Performed by: STUDENT IN AN ORGANIZED HEALTH CARE EDUCATION/TRAINING PROGRAM

## 2023-05-17 PROCEDURE — 99214 OFFICE O/P EST MOD 30 MIN: CPT | Mod: PBBFAC,25,27 | Performed by: PHYSICIAN ASSISTANT

## 2023-05-17 PROCEDURE — 73552 XR FEMUR 2 VIEW LEFT: ICD-10-PCS | Mod: 26,LT,, | Performed by: STUDENT IN AN ORGANIZED HEALTH CARE EDUCATION/TRAINING PROGRAM

## 2023-05-17 PROCEDURE — 99214 PR OFFICE/OUTPT VISIT, EST, LEVL IV, 30-39 MIN: ICD-10-PCS | Mod: S$PBB,,, | Performed by: PHYSICIAN ASSISTANT

## 2023-05-17 PROCEDURE — 72170 X-RAY EXAM OF PELVIS: CPT | Mod: TC

## 2023-05-17 PROCEDURE — 99215 OFFICE O/P EST HI 40 MIN: CPT | Mod: PBBFAC,25

## 2023-05-17 PROCEDURE — 99999 PR PBB SHADOW E&M-EST. PATIENT-LVL V: ICD-10-PCS | Mod: PBBFAC,,,

## 2023-05-17 PROCEDURE — 72170 X-RAY EXAM OF PELVIS: CPT | Mod: 26,,, | Performed by: STUDENT IN AN ORGANIZED HEALTH CARE EDUCATION/TRAINING PROGRAM

## 2023-05-17 PROCEDURE — 73552 X-RAY EXAM OF FEMUR 2/>: CPT | Mod: TC,LT

## 2023-05-17 PROCEDURE — 72170 XR PELVIS ROUTINE AP: ICD-10-PCS | Mod: 26,,, | Performed by: STUDENT IN AN ORGANIZED HEALTH CARE EDUCATION/TRAINING PROGRAM

## 2023-05-17 PROCEDURE — 99999 PR PBB SHADOW E&M-EST. PATIENT-LVL IV: CPT | Mod: PBBFAC,,, | Performed by: PHYSICIAN ASSISTANT

## 2023-05-17 PROCEDURE — 99999 PR PBB SHADOW E&M-EST. PATIENT-LVL V: CPT | Mod: PBBFAC,,,

## 2023-05-17 PROCEDURE — 99204 OFFICE O/P NEW MOD 45 MIN: CPT | Mod: S$PBB,,,

## 2023-05-17 PROCEDURE — 99999 PR PBB SHADOW E&M-EST. PATIENT-LVL IV: ICD-10-PCS | Mod: PBBFAC,,, | Performed by: PHYSICIAN ASSISTANT

## 2023-05-17 PROCEDURE — 99204 PR OFFICE/OUTPT VISIT, NEW, LEVL IV, 45-59 MIN: ICD-10-PCS | Mod: S$PBB,,,

## 2023-05-17 PROCEDURE — 99214 OFFICE O/P EST MOD 30 MIN: CPT | Mod: S$PBB,,, | Performed by: PHYSICIAN ASSISTANT

## 2023-05-17 RX ORDER — TERIPARATIDE 250 UG/ML
20 INJECTION, SOLUTION SUBCUTANEOUS DAILY
Qty: 2.4 ML | Refills: 11 | Status: ACTIVE | OUTPATIENT
Start: 2023-05-17 | End: 2024-01-02 | Stop reason: SDUPTHER

## 2023-05-17 NOTE — PATIENT INSTRUCTIONS
Continue vitamin-D3 1000 units daily  Continue calcium supplement   Continue calcium and vitamin-D in diet

## 2023-05-17 NOTE — Clinical Note
Thank you for your osteoporosis referral!  We will see if insurance covers Forteo.  I believe her family will help administer the medication.

## 2023-05-21 NOTE — PROGRESS NOTES
Subjective:      Patient ID: Rosy Balderrama is a 89 y.o. female.    Chief Complaint: Pain of the Left Femur      HPI: Rosy Balderrama is a 89-year-old female in clinic today for postoperative follow-up.  Patient is 10 months status post ORIF of left periprosthetic femur fracture.  Patient has been nonweightbearing since about 3 months following the surgery.  She was supposed to be nonweightbearing until then, but according to the son the therapists at the nursing facility instructed the patient to perform weight-bearing.  Patient was seen prior to this appointment today by Rheumatology to discuss the osteoporosis that was revealed by her bone density scan.  They are going to start her on treatment for this.  Patient continues to perform home exercises and receive home health for ROM and strengthening exercises of the left lower extremity.  She denies numbness or tingling in the extremity.  She denies worsening pain.  Her son is with her and does most of the speaking, he is very involved in her care.    Past Medical History:   Diagnosis Date    *Atrial fibrillation     Allergy     Blood transfusion     Cataract     Diabetes mellitus type II     Hyperlipidemia     Osteoporosis        Current Outpatient Medications:     AMINO ACIDS/MINERALS (A/G PRO ORAL), Take 2 tablets by mouth 2 (two) times a day., Disp: , Rfl:     ANTIOX#10/OM3/DHA/EPA/LUT/ZEAX (I-CAPS ORAL), Take 1 capsule by mouth once daily., Disp: , Rfl:     apixaban (ELIQUIS) 5 mg Tab, Take 5 mg by mouth 2 (two) times daily., Disp: , Rfl:     aspirin 81 MG Chew, Take 81 mg by mouth nightly., Disp: , Rfl:     bumetanide (BUMEX) 1 MG tablet, Take 1 mg by mouth once daily., Disp: , Rfl:     CALCIUM CARBONATE/VITAMIN D3 (CALTRATE 600 + D ORAL), Take 1 tablet by mouth once daily., Disp: , Rfl:     cranberry fruit concentrate (AZO CRANBERRY ORAL), Take 1 tablet by mouth 2 (two) times a day., Disp: , Rfl:     EScitalopram oxalate (LEXAPRO) 10 MG tablet, Take 10 mg by  mouth once daily., Disp: , Rfl:     fexofenadine (ALLEGRA) 180 MG tablet, Take 180 mg by mouth once daily., Disp: , Rfl:     memantine (NAMENDA) 10 MG Tab, Take 10 mg by mouth 2 (two) times daily., Disp: , Rfl:     metoprolol tartrate (LOPRESSOR) 25 MG tablet, Take 12.5 mg by mouth 2 (two) times daily., Disp: , Rfl:     MULTIVITAMIN ORAL, Take 1 tablet by mouth once daily., Disp: , Rfl:     naproxen sodium (ANAPROX) 220 MG tablet, Take 220 mg by mouth 2 (two) times daily with meals., Disp: , Rfl:     omega-3 fatty acids/fish oil (FISH OIL-OMEGA-3 FATTY ACIDS) 300-1,000 mg capsule, Take 1 capsule by mouth once daily., Disp: , Rfl:     pravastatin (PRAVACHOL) 40 MG tablet, Take 40 mg by mouth once daily., Disp: , Rfl:     QUEtiapine (SEROQUEL) 25 MG Tab, Take 25 mg by mouth nightly., Disp: , Rfl:     raloxifene (EVISTA) 60 mg tablet, Take 60 mg by mouth once daily., Disp: , Rfl:     sitagliptan (JANUVIA) 100 MG Tab, Take 100 mg by mouth once daily., Disp: , Rfl:     teriparatide (FORTEO) 20 mcg/dose (600mcg/2.4mL) PnIj, Inject 0.08 mLs (20 mcg total) into the skin once daily., Disp: 2.4 mL, Rfl: 11    traMADoL (ULTRAM) 50 mg tablet, Take 50 mg by mouth every 8 (eight) hours as needed for Pain., Disp: , Rfl:     vitamin D (VITAMIN D3) 1000 units Tab, Take 1,000 Units by mouth once daily., Disp: , Rfl:   Review of patient's allergies indicates:   Allergen Reactions    Alendronate Other (See Comments)    Atorvastatin Other (See Comments)    Ciprofloxacin Other (See Comments)    Codeine Itching    Diazepam Itching    Ibuprofen Other (See Comments)    Nitrofurantoin monohyd/m-cryst     Oxycodone-acetaminophen Other (See Comments) and Itching    Rofecoxib Other (See Comments)    Seldane     Simvastatin Other (See Comments)       /61 (BP Location: Left arm, Patient Position: Sitting, BP Method: Medium (Automatic))   Pulse 66   Ht 5' (1.524 m)   Wt 75.3 kg (166 lb 0.1 oz)   BMI 32.42 kg/m²     ROS      Objective:     Ortho Exam   Left lower extremity:  Incision well healed, no signs of infection  No edema  No TTP  No pain with internal/external rotation  Knee ROM full   Patient is unable to perform a straight leg raise, but her quad strength is improving and she is able to perform a knee extension against gravity   Calf and compartments are soft and compressible   Motor exam normal   Sensation and pulses intact     GEN: Well developed, well nourished female. AAOX3. No acute distress.   Head: Normocephalic, atraumatic.   Eyes: MARY  Neck: Trachea is midline, no adenopathy  Resp: Breathing unlabored.  Neuro: Motor function normal, Cranial nerves intact  Psych: Mood and affect appropriate.       Assessment:     Imaging:  X-ray of the left femur obtained today once again shows prosthesis and hardware in similar alignment to previous films with proximal portion of the lateral femoral plate not fully down on the lateral cortex.  There is still incomplete healing at the fracture site into the distal aspect of the femoral stem.  No worsening alignment.  No significant interval healing.  No detrimental changes.        1. Periprosthetic fracture of femur following total replacement of hip, subsequent encounter    2. Age-related osteoporosis with current pathological fracture, left femur, subsequent encounter for fracture with nonunion          Plan:       Reviewed the radiographs with the patient and her son.  Recommended she continue with nonweightbearing at this time.  I have also recommended that we start with a bone stimulator.  I explained to the patient and her son that I think the treatment for her osteoporosis in combination with a bone stimulator may help to decrease the time that it takes for this fracture to heal.  We have previously discussed revision surgery, but due to the patient's age, mental state, and overall health the son agrees that this would not be in her best interest.  I would like to see the patient back in  clinic in about 6 weeks for repeat radiographs and further evaluation.    Orders Placed This Encounter    Bone Stimulator for Home Use     Follow up in about 6 weeks (around 6/28/2023).          Patient note was created using MModal Dictation.  Any errors in syntax or even information may not have been identified and edited on initial review prior to signing this note.

## 2023-05-25 ENCOUNTER — TELEPHONE (OUTPATIENT)
Dept: PHARMACY | Facility: CLINIC | Age: 88
End: 2023-05-25
Payer: MEDICARE

## 2023-05-25 NOTE — TELEPHONE ENCOUNTER
Hope, this is Yobani Nicolas, clinical pharmacist with [facility] that is part of your care team.  We have begun working on your prescription that your doctor has sent us. Our next steps include:     Working with your insurance company to obtain approval for your medication  Working with you to ensure your medication is affordable     We will be calling you along the way with updates on your medication but if you have any concerns or receive information that you would like to discuss please reach us at [phone].    Welcome call outcome: Patient/caregiver reached

## 2023-05-31 ENCOUNTER — SPECIALTY PHARMACY (OUTPATIENT)
Dept: PHARMACY | Facility: CLINIC | Age: 88
End: 2023-05-31
Payer: MEDICARE

## 2023-05-31 NOTE — TELEPHONE ENCOUNTER
[facility]. Patient must fill at [facility].  Patient informed via phone. Spoke with son, Arben.   Rx transferred. Closing out at [facility].

## 2023-07-14 ENCOUNTER — HOSPITAL ENCOUNTER (OUTPATIENT)
Dept: RADIOLOGY | Facility: HOSPITAL | Age: 88
Discharge: HOME | End: 2023-07-14
Attending: PHYSICIAN ASSISTANT
Payer: MEDICARE

## 2023-07-14 ENCOUNTER — OFFICE VISIT (OUTPATIENT)
Dept: ORTHOPEDICS | Facility: CLINIC | Age: 88
End: 2023-07-14
Payer: MEDICARE

## 2023-07-14 VITALS
DIASTOLIC BLOOD PRESSURE: 75 MMHG | WEIGHT: 166 LBS | SYSTOLIC BLOOD PRESSURE: 134 MMHG | HEIGHT: 60 IN | BODY MASS INDEX: 32.59 KG/M2 | HEART RATE: 60 BPM

## 2023-07-14 DIAGNOSIS — Z96.649 PERIPROSTHETIC FRACTURE OF FEMUR FOLLOWING TOTAL REPLACEMENT OF HIP, SUBSEQUENT ENCOUNTER: Primary | ICD-10-CM

## 2023-07-14 DIAGNOSIS — M97.8XXD PERIPROSTHETIC FRACTURE OF FEMUR FOLLOWING TOTAL REPLACEMENT OF HIP, SUBSEQUENT ENCOUNTER: Primary | ICD-10-CM

## 2023-07-14 DIAGNOSIS — M89.8X5 PAIN OF LEFT FEMUR: ICD-10-CM

## 2023-07-14 DIAGNOSIS — M89.8X5 PAIN OF LEFT FEMUR: Primary | ICD-10-CM

## 2023-07-14 PROCEDURE — 73552 X-RAY EXAM OF FEMUR 2/>: CPT | Mod: TC,LT

## 2023-07-14 PROCEDURE — 73552 XR FEMUR 2 VIEW LEFT: ICD-10-PCS | Mod: 26,LT,, | Performed by: RADIOLOGY

## 2023-07-14 PROCEDURE — 99999 PR PBB SHADOW E&M-EST. PATIENT-LVL V: ICD-10-PCS | Mod: PBBFAC,,, | Performed by: ORTHOPAEDIC SURGERY

## 2023-07-14 PROCEDURE — 99213 OFFICE O/P EST LOW 20 MIN: CPT | Mod: S$PBB,,, | Performed by: ORTHOPAEDIC SURGERY

## 2023-07-14 PROCEDURE — 72170 X-RAY EXAM OF PELVIS: CPT | Mod: TC

## 2023-07-14 PROCEDURE — 99999 PR PBB SHADOW E&M-EST. PATIENT-LVL V: CPT | Mod: PBBFAC,,, | Performed by: ORTHOPAEDIC SURGERY

## 2023-07-14 PROCEDURE — 72170 XR PELVIS ROUTINE AP: ICD-10-PCS | Mod: 26,,, | Performed by: RADIOLOGY

## 2023-07-14 PROCEDURE — 99215 OFFICE O/P EST HI 40 MIN: CPT | Mod: PBBFAC | Performed by: ORTHOPAEDIC SURGERY

## 2023-07-14 PROCEDURE — 99213 PR OFFICE/OUTPT VISIT, EST, LEVL III, 20-29 MIN: ICD-10-PCS | Mod: S$PBB,,, | Performed by: ORTHOPAEDIC SURGERY

## 2023-07-14 PROCEDURE — 73552 X-RAY EXAM OF FEMUR 2/>: CPT | Mod: 26,LT,, | Performed by: RADIOLOGY

## 2023-07-14 PROCEDURE — 72170 X-RAY EXAM OF PELVIS: CPT | Mod: 26,,, | Performed by: RADIOLOGY

## 2023-07-14 NOTE — PATIENT INSTRUCTIONS
Healing fracture of the left femur    Recommend home health physical therapy for gait training, weight bear as tolerated.      Return here in 1 month with x-rays of the left femur

## 2023-07-14 NOTE — PROGRESS NOTES
Subjective:     Patient ID: Rosy Balderrama is a 89 y.o. female.    Chief Complaint: Pain of the Left Femur      HPI:  The patient is brought in by her son for follow-up of her left femur periprosthetic fracture.  She had surgery July 19, 2022.  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  Has been wheelchair ambulatory, nonweightbearing.  Using a bone stimulator and also receiving Forteo.  She has no pain complaints.  She does have dementia in her son says she sleeps a lot    Past Medical History:   Diagnosis Date    *Atrial fibrillation     Allergy     Blood transfusion     Cataract     Diabetes mellitus type II     Hyperlipidemia     Osteoporosis      Past Surgical History:   Procedure Laterality Date    ANKLE SURGERY      broken ankle    BILATERAL SALPINGOOPHORECTOMY      BREAST BIOPSY      CARPAL TUNNEL RELEASE      CHOLECYSTECTOMY      COLONOSCOPY      ERCP      ESOPHAGOGASTRODUODENOSCOPY      FRACTURE SURGERY      KNEE CARTILAGE SURGERY      ORIF FEMUR FRACTURE Left 7/19/2022    Procedure: ORIF, FRACTURE, FEMUR;  Surgeon: Yulissa Marx MD;  Location: [facility];  Service: Orthopedics;  Laterality: Left;  Periprosthetic femur fracture    TOTAL ABDOMINAL HYSTERECTOMY      TOTAL HIP ARTHROPLASTY      TUBAL LIGATION       Family History   Problem Relation Age of Onset    Cancer Mother     Cancer Sister     Diabetes Brother      Social History     Socioeconomic History    Marital status:    Occupational History    Occupation: Retired/Housewife     Comment: Housewife   Tobacco Use    Smoking status: Never    Smokeless tobacco: Never   Substance and Sexual Activity    Alcohol use: No    Drug use: No   Social History Narrative    Patient is a housewife.     Medication List with Changes/Refills   Current Medications    AMINO ACIDS/MINERALS (A/G PRO ORAL)    Take 2 tablets by mouth 2 (two) times a day.    ANTIOX#10/OM3/DHA/EPA/LUT/ZEAX (I-CAPS ORAL)    Take 1 capsule by mouth once daily.    APIXABAN (ELIQUIS) 5  MG TAB    Take 5 mg by mouth 2 (two) times daily.    ASPIRIN 81 MG CHEW    Take 81 mg by mouth nightly.    BUMETANIDE (BUMEX) 1 MG TABLET    Take 1 mg by mouth once daily.    CALCIUM CARBONATE/VITAMIN D3 (CALTRATE 600 + D ORAL)    Take 1 tablet by mouth once daily.    CRANBERRY FRUIT CONCENTRATE (AZO CRANBERRY ORAL)    Take 1 tablet by mouth 2 (two) times a day.    ESCITALOPRAM OXALATE (LEXAPRO) 10 MG TABLET    Take 10 mg by mouth once daily.    FEXOFENADINE (ALLEGRA) 180 MG TABLET    Take 180 mg by mouth once daily.    MEMANTINE (NAMENDA) 10 MG TAB    Take 10 mg by mouth 2 (two) times daily.    METOPROLOL TARTRATE (LOPRESSOR) 25 MG TABLET    Take 12.5 mg by mouth 2 (two) times daily.    MULTIVITAMIN ORAL    Take 1 tablet by mouth once daily.    NAPROXEN SODIUM (ANAPROX) 220 MG TABLET    Take 220 mg by mouth 2 (two) times daily with meals.    OMEGA-3 FATTY ACIDS/FISH OIL (FISH OIL-OMEGA-3 FATTY ACIDS) 300-1,000 MG CAPSULE    Take 1 capsule by mouth once daily.    PRAVASTATIN (PRAVACHOL) 40 MG TABLET    Take 40 mg by mouth once daily.    QUETIAPINE (SEROQUEL) 25 MG TAB    Take 25 mg by mouth nightly.    RALOXIFENE (EVISTA) 60 MG TABLET    Take 60 mg by mouth once daily.    SITAGLIPTAN (JANUVIA) 100 MG TAB    Take 100 mg by mouth once daily.    TERIPARATIDE (FORTEO) 20 MCG/DOSE (600MCG/2.4ML) PNIJ    Inject 0.08 mLs (20 mcg total) into the skin once daily.    TRAMADOL (ULTRAM) 50 MG TABLET    Take 50 mg by mouth every 8 (eight) hours as needed for Pain.    VITAMIN D (VITAMIN D3) 1000 UNITS TAB    Take 1,000 Units by mouth once daily.     Review of patient's allergies indicates:   Allergen Reactions    Alendronate Other (See Comments)    Atorvastatin Other (See Comments)    Ciprofloxacin Other (See Comments)    Codeine Itching    Diazepam Itching    Ibuprofen Other (See Comments)    Nitrofurantoin monohyd/m-cryst     Oxycodone-acetaminophen Other (See Comments) and Itching    Rofecoxib Other (See Comments)    Seldane      Simvastatin Other (See Comments)     ROS     Objective:   Body mass index is 32.42 kg/m².  Vitals:    07/14/23 1054   BP: 134/75   Pulse: 60   Weight: 75.3 kg (166 lb 0.1 oz)   Height: 5' (1.524 m)   PainSc: 0-No pain   PainLoc: Leg       PHYSICAL EXAM:  Overweight female in no acute distress.  She is very drowsy but arouses.  Comes in by wheelchair.    Examination of the left leg reveals that there is no tenderness.  The knee is slightly thickened.  Hamstrings are tight with knee extension limiting less than 5°.  Can flex to 95°.    X-rays of the left femur .  There is a trochanteric fixation nail on the right.  There is left total hip replacement.  There is a fracture at the left femur at the tip of the femoral stem.  This is been repaired with lateral plate screws and cables.  The fracture has gone into varus with the plate riding off proximally.  There was solid callus at the medial shaft.  There is a small transverse fracture line lateral to the tip of the femoral stem at the lateral cortex.  Lines are noted consistent with healing distal to the tip.    Periprosthetic fracture of femur following total replacement of hip, subsequent encounter  -     Ambulatory referral/consult to Home Health; Future; Expected date: 07/15/2023        Plan:  The patient's fracture is nearly solid.  I believe she can tolerate weight-bearing at this point.  Can weight bear as tolerated.  Recommended home health to assist with that.  Discussed with her son that she may not achieve much greater activity level but if she can learn how to transfer on her own that would be worthwhile.      He does want to get a better lifting device to get her into the bathtub.  He is going to look into what insurance offers there.    Follow-up here with x-rays of the left femur in 1 month    Patient Instructions   Healing fracture of the left femur    Recommend home health physical therapy for gait training, weight bear as tolerated.      Return here  in 1 month with x-rays of the left femur           Tesfaye Webb MD, FAAOS [facility], Orthopedic Trauma Service  [place]

## 2023-07-17 ENCOUNTER — OFFICE VISIT (OUTPATIENT)
Dept: RHEUMATOLOGY | Facility: CLINIC | Age: 88
End: 2023-07-17
Payer: MEDICARE

## 2023-07-17 ENCOUNTER — LAB VISIT (OUTPATIENT)
Dept: LAB | Facility: HOSPITAL | Age: 88
End: 2023-07-17
Payer: MEDICARE

## 2023-07-17 VITALS
BODY MASS INDEX: 32.59 KG/M2 | DIASTOLIC BLOOD PRESSURE: 71 MMHG | HEART RATE: 76 BPM | WEIGHT: 166 LBS | HEIGHT: 60 IN | SYSTOLIC BLOOD PRESSURE: 108 MMHG

## 2023-07-17 DIAGNOSIS — M80.00XG AGE-RELATED OSTEOPOROSIS WITH CURRENT PATHOLOGICAL FRACTURE WITH DELAYED HEALING, SUBSEQUENT ENCOUNTER: ICD-10-CM

## 2023-07-17 DIAGNOSIS — Z71.89 COUNSELING ON HEALTH PROMOTION AND DISEASE PREVENTION: ICD-10-CM

## 2023-07-17 DIAGNOSIS — Z96.649 PERIPROSTHETIC FRACTURE OF FEMUR FOLLOWING TOTAL REPLACEMENT OF HIP, SEQUELA: ICD-10-CM

## 2023-07-17 DIAGNOSIS — M97.8XXS PERIPROSTHETIC FRACTURE OF FEMUR FOLLOWING TOTAL REPLACEMENT OF HIP, SEQUELA: ICD-10-CM

## 2023-07-17 DIAGNOSIS — M80.00XG AGE-RELATED OSTEOPOROSIS WITH CURRENT PATHOLOGICAL FRACTURE WITH DELAYED HEALING, SUBSEQUENT ENCOUNTER: Primary | ICD-10-CM

## 2023-07-17 LAB
ALBUMIN SERPL BCP-MCNC: 3.3 G/DL (ref 3.5–5.2)
ALP SERPL-CCNC: 62 U/L (ref 55–135)
ALT SERPL W/O P-5'-P-CCNC: 9 U/L (ref 10–44)
ANION GAP SERPL CALC-SCNC: 8 MMOL/L (ref 8–16)
AST SERPL-CCNC: 13 U/L (ref 10–40)
BILIRUB SERPL-MCNC: 0.4 MG/DL (ref 0.1–1)
BUN SERPL-MCNC: 27 MG/DL (ref 10–30)
CALCIUM SERPL-MCNC: 9 MG/DL (ref 8.7–10.5)
CHLORIDE SERPL-SCNC: 111 MMOL/L (ref 95–110)
CO2 SERPL-SCNC: 23 MMOL/L (ref 23–29)
CREAT SERPL-MCNC: 0.8 MG/DL (ref 0.5–1.4)
EST. GFR  (NO RACE VARIABLE): >60 ML/MIN/1.73 M^2
GLUCOSE SERPL-MCNC: 271 MG/DL (ref 70–110)
POTASSIUM SERPL-SCNC: 4.1 MMOL/L (ref 3.5–5.1)
PROT SERPL-MCNC: 6.6 G/DL (ref 6–8.4)
SODIUM SERPL-SCNC: 142 MMOL/L (ref 136–145)

## 2023-07-17 PROCEDURE — 99999 PR PBB SHADOW E&M-EST. PATIENT-LVL IV: CPT | Mod: PBBFAC,,,

## 2023-07-17 PROCEDURE — 99214 OFFICE O/P EST MOD 30 MIN: CPT | Mod: S$PBB,,,

## 2023-07-17 PROCEDURE — 36415 COLL VENOUS BLD VENIPUNCTURE: CPT

## 2023-07-17 PROCEDURE — 80053 COMPREHEN METABOLIC PANEL: CPT

## 2023-07-17 PROCEDURE — 99214 OFFICE O/P EST MOD 30 MIN: CPT | Mod: PBBFAC

## 2023-07-17 PROCEDURE — 99214 PR OFFICE/OUTPT VISIT, EST, LEVL IV, 30-39 MIN: ICD-10-PCS | Mod: S$PBB,,,

## 2023-07-17 PROCEDURE — 99999 PR PBB SHADOW E&M-EST. PATIENT-LVL IV: ICD-10-PCS | Mod: PBBFAC,,,

## 2023-07-17 NOTE — Clinical Note
RTC 5/2024 with dexa (at location with niko lift), cmp  Patients family member very upset that appointments were not scheduled all on the same day. For next May appt can try to do our appt with ortho or another providers appt same day if able. Explained to patient's son it depends on schedule availability unfortunately.

## 2023-07-17 NOTE — PROGRESS NOTES
RHEUMATOLOGY OUTPATIENT CLINIC NOTE    07/17/2023    Subjective:       Patient ID: Rosy Balderrama is a 89 y.o. female.    Chief Complaint: Osteoporosis      HPI       Rosy Balderrama is a 89 y.o. pleasant female here for rheumatology follow up for osteoporosis. Her son joins her in clinic.    Started forteo around 6/2023. Daughter administers medication. Daughter is not here today but son joins in clinic. He does not know exact start date or other current medications.     Takes vitamin-D 1000 units daily.  She is in a wheelchair, transfers via lift.  She has a very limited activity level.  Prior fracture left femur.  She and her family are unsure if she fell at that time or not.  Follows with ortho.       Fosamax listed as allergy, patient does not recall taking this medication    Rheumatologic review of systems negative otherwise.     Physical Exam:  Patient is in a wheelchair.  She does not know the answer to many questions.  Family helps provide most of the questions of her history.      Objective:       /71   Pulse 76   Ht 5' (1.524 m)   Wt 75.3 kg (166 lb 0.1 oz)   BMI 32.42 kg/m²         There is no immunization history on file for this patient.          No results found for this or any previous visit (from the past 672 hour(s)).      DEXA 05/17/2023:   Radius UD-1.5, radius 33% -3.0, radius total -2.1    Assessment:       1. Age-related osteoporosis with current pathological fracture with delayed healing, subsequent encounter    2. Periprosthetic fracture of femur following total replacement of hip, sequela    3. Counseling on health promotion and disease prevention            Impression:     Osteoporosis:  Osteoporosis on DEXA 05/17/2023 forearm.  Recent fragility fracture left femur around 07/2022.  Limited exercise inactivity.  History of Fosamax per chart review, listed as allergy, unsure allergy response.  Started forteo 6/2023? Unsure exact date    Left femur fracture:   Prior hip  replacement surgeries.  Left femur fracture within the last year.  Following with Orthopedics.    Counseling on health promotion and disease prevention  Over 10 minutes spent regarding below topics:  - Nutrition and exercise counseling.  - Medication counseling provided.    - Discussed osteoporosis/low bone density disease state in detail with patient.  Reviewed treatment options along with potential side effects of these treatments.  Dexa scan was reviewed with patient.  Treatment plan agreed upon together with patient      Plan:          Osteoporosis  Medication  Continue forteo (6/2023-)  Dexa  Repeat 5/2024 (one year after forteo therapy)  Prefer to do at location that has niko lift if possible  Counseling   Weight bearing activity as tolerated  Caution to avoid falls    Patient's daughter administers medication  Future plan  Consider Reclast (allergy to fosamax?) vs prolia   Reclast may be easier due to wheelchair bound     Fracture:   Continue following with Orthopedics  Encouraged patient to participate in PT and home exercises    RTC 5/2024 with dexa (niko lift), cmp   Patients family member very upset that appointments were not scheduled all on the same day. For next May appt can try to do our appt with ortho or another providers appt same day if able. Explained to patient's son it depends on schedule availability unfortunately.     Thalia Vasquez PA-C  [facility]  Rheumatology     30 minutes of total time spent on the encounter, which includes face to face time and non-face to face time preparing to see the patient (eg, review of tests), Obtaining and/or reviewing separately obtained history, Documenting clinical information in the electronic or other health record, Independently interpreting results (not separately reported) and communicating results to the patient/family/caregiver, or Care coordination (not separately reported).       Disclaimer: This note was prepared  using voice recognition system and is likely to have sound alike errors and is not proof read.  Please call me with any questions

## 2023-07-18 PROCEDURE — G0180 PR HOME HEALTH MD CERTIFICATION: ICD-10-PCS | Mod: ,,, | Performed by: ORTHOPAEDIC SURGERY

## 2023-07-18 PROCEDURE — G0180 MD CERTIFICATION HHA PATIENT: HCPCS | Mod: ,,, | Performed by: ORTHOPAEDIC SURGERY

## 2023-08-08 ENCOUNTER — EXTERNAL HOME HEALTH (OUTPATIENT)
Dept: HOME HEALTH SERVICES | Facility: HOSPITAL | Age: 88
End: 2023-08-08
Payer: MEDICARE

## 2023-08-11 ENCOUNTER — HOSPITAL ENCOUNTER (OUTPATIENT)
Dept: RADIOLOGY | Facility: HOSPITAL | Age: 88
Discharge: HOME | End: 2023-08-11
Attending: ORTHOPAEDIC SURGERY
Payer: MEDICARE

## 2023-08-11 ENCOUNTER — OFFICE VISIT (OUTPATIENT)
Dept: ORTHOPEDICS | Facility: CLINIC | Age: 88
End: 2023-08-11
Payer: MEDICARE

## 2023-08-11 VITALS — WEIGHT: 166 LBS | BODY MASS INDEX: 32.59 KG/M2 | HEIGHT: 60 IN

## 2023-08-11 DIAGNOSIS — M89.8X5 PAIN OF LEFT FEMUR: Primary | ICD-10-CM

## 2023-08-11 DIAGNOSIS — Z96.649 PERIPROSTHETIC FRACTURE OF FEMUR FOLLOWING TOTAL REPLACEMENT OF HIP, SUBSEQUENT ENCOUNTER: Primary | ICD-10-CM

## 2023-08-11 DIAGNOSIS — M89.8X5 PAIN OF LEFT FEMUR: ICD-10-CM

## 2023-08-11 DIAGNOSIS — M97.8XXD PERIPROSTHETIC FRACTURE OF FEMUR FOLLOWING TOTAL REPLACEMENT OF HIP, SUBSEQUENT ENCOUNTER: Primary | ICD-10-CM

## 2023-08-11 PROCEDURE — 99999 PR PBB SHADOW E&M-EST. PATIENT-LVL IV: ICD-10-PCS | Mod: PBBFAC,,, | Performed by: PHYSICIAN ASSISTANT

## 2023-08-11 PROCEDURE — 99214 OFFICE O/P EST MOD 30 MIN: CPT | Mod: S$PBB,,, | Performed by: PHYSICIAN ASSISTANT

## 2023-08-11 PROCEDURE — 73552 XR FEMUR 2 VIEW LEFT: ICD-10-PCS | Mod: 26,LT,, | Performed by: RADIOLOGY

## 2023-08-11 PROCEDURE — 99999 PR PBB SHADOW E&M-EST. PATIENT-LVL IV: CPT | Mod: PBBFAC,,, | Performed by: PHYSICIAN ASSISTANT

## 2023-08-11 PROCEDURE — 73552 X-RAY EXAM OF FEMUR 2/>: CPT | Mod: 26,LT,, | Performed by: RADIOLOGY

## 2023-08-11 PROCEDURE — 99214 PR OFFICE/OUTPT VISIT, EST, LEVL IV, 30-39 MIN: ICD-10-PCS | Mod: S$PBB,,, | Performed by: PHYSICIAN ASSISTANT

## 2023-08-11 PROCEDURE — 73552 X-RAY EXAM OF FEMUR 2/>: CPT | Mod: TC,LT

## 2023-08-11 PROCEDURE — 99214 OFFICE O/P EST MOD 30 MIN: CPT | Mod: PBBFAC | Performed by: PHYSICIAN ASSISTANT

## 2023-08-11 NOTE — PROGRESS NOTES
Subjective:      Patient ID: Rosy Balderrama is a 89 y.o. female.    Chief Complaint: Post-op Evaluation of the Left Femur      HPI: Rosy Balderrama is a 89-year-old female in clinic today for postoperative follow-up.  Patient is 13 months status post ORIF of left femur periprosthetic fracture.  Patient was last seen in this clinic on 07/14/2023 by Dr. Webb at which time she was instructed to weight bear as tolerated.  Patient's son is with here again today and reports that her therapy has been going slow, but she does seem to be making some progress.  She does not complain of any increased pain with weight-bearing.    Past Medical History:   Diagnosis Date    *Atrial fibrillation     Allergy     Blood transfusion     Cataract     Diabetes mellitus type II     Hyperlipidemia     Osteoporosis        Current Outpatient Medications:     AMINO ACIDS/MINERALS (A/G PRO ORAL), Take 2 tablets by mouth 2 (two) times a day., Disp: , Rfl:     ANTIOX#10/OM3/DHA/EPA/LUT/ZEAX (I-CAPS ORAL), Take 1 capsule by mouth once daily., Disp: , Rfl:     apixaban (ELIQUIS) 5 mg Tab, Take 5 mg by mouth 2 (two) times daily., Disp: , Rfl:     aspirin 81 MG Chew, Take 81 mg by mouth nightly., Disp: , Rfl:     bumetanide (BUMEX) 1 MG tablet, Take 1 mg by mouth once daily., Disp: , Rfl:     CALCIUM CARBONATE/VITAMIN D3 (CALTRATE 600 + D ORAL), Take 1 tablet by mouth once daily., Disp: , Rfl:     cranberry fruit concentrate (AZO CRANBERRY ORAL), Take 1 tablet by mouth 2 (two) times a day., Disp: , Rfl:     EScitalopram oxalate (LEXAPRO) 10 MG tablet, Take 10 mg by mouth once daily., Disp: , Rfl:     fexofenadine (ALLEGRA) 180 MG tablet, Take 180 mg by mouth once daily., Disp: , Rfl:     memantine (NAMENDA) 10 MG Tab, Take 10 mg by mouth 2 (two) times daily., Disp: , Rfl:     metoprolol tartrate (LOPRESSOR) 25 MG tablet, Take 12.5 mg by mouth 2 (two) times daily., Disp: , Rfl:     MULTIVITAMIN ORAL, Take 1 tablet by mouth once daily., Disp: , Rfl:      naproxen sodium (ANAPROX) 220 MG tablet, Take 220 mg by mouth 2 (two) times daily with meals., Disp: , Rfl:     omega-3 fatty acids/fish oil (FISH OIL-OMEGA-3 FATTY ACIDS) 300-1,000 mg capsule, Take 1 capsule by mouth once daily., Disp: , Rfl:     pravastatin (PRAVACHOL) 40 MG tablet, Take 40 mg by mouth once daily., Disp: , Rfl:     QUEtiapine (SEROQUEL) 25 MG Tab, Take 25 mg by mouth nightly., Disp: , Rfl:     raloxifene (EVISTA) 60 mg tablet, Take 60 mg by mouth once daily., Disp: , Rfl:     sitagliptan (JANUVIA) 100 MG Tab, Take 100 mg by mouth once daily., Disp: , Rfl:     teriparatide (FORTEO) 20 mcg/dose (600mcg/2.4mL) PnIj, Inject 0.08 mLs (20 mcg total) into the skin once daily., Disp: 2.4 mL, Rfl: 11    traMADoL (ULTRAM) 50 mg tablet, Take 50 mg by mouth every 8 (eight) hours as needed for Pain., Disp: , Rfl:     vitamin D (VITAMIN D3) 1000 units Tab, Take 1,000 Units by mouth once daily., Disp: , Rfl:   Review of patient's allergies indicates:   Allergen Reactions    Alendronate Other (See Comments)    Atorvastatin Other (See Comments)    Ciprofloxacin Other (See Comments)    Codeine Itching    Diazepam Itching    Ibuprofen Other (See Comments)    Nitrofurantoin monohyd/m-cryst     Oxycodone-acetaminophen Other (See Comments) and Itching    Rofecoxib Other (See Comments)    Seldane     Simvastatin Other (See Comments)       Ht 5' (1.524 m)   Wt 75.3 kg (166 lb)   BMI 32.42 kg/m²     ROS      Objective:    Ortho Exam   Left lower extremity:  Incision is well healed, no signs of infection  No edema   No TTP  No pain with internal/external rotation   Calf and compartments are soft and compressible  Motor exam normal   Sensation and pulses intact    GEN: Well developed, well nourished female. AAOX3. No acute distress.   Head: Normocephalic, atraumatic.   Eyes: MARY  Neck: Trachea is midline, no adenopathy  Resp: Breathing unlabored.  Neuro: Motor function normal, Cranial nerves intact  Psych: Mood and  affect appropriate.       Assessment:     Imaging:  X-ray left femur obtained today shows hardware and prosthesis in similar alignment as previous films with persistent fracture lucency at the tip of the femoral component of the left hip prosthesis.  No worsening alignment.  There is marginal healing noted.  Advanced arterial calcifications are once again noted.  No detrimental changes.        1. Periprosthetic fracture of femur following total replacement of hip, subsequent encounter          Plan:       Reviewed the radiographs with the patient and her son.  Explained that the radiographs are stable even though the patient has increased her weight-bearing, which is a good sign.  Patient was encouraged to continue working with therapy for ambulation.  I spoke with the son and recommended that he call the [facility] and we will request them get adequate assistance for this patient as he stated that the therapist sometimes has difficulty moving the patient on her own.  We will see the patient back in this clinic in about 6 weeks for repeat radiographs and further evaluation.       Follow up in about 6 weeks (around 9/22/2023).          Patient note was created using [employer] Dictation.  Any errors in syntax or even information may not have been identified and edited on initial review prior to signing this note.

## 2024-01-02 DIAGNOSIS — Z96.649 PERIPROSTHETIC FRACTURE OF FEMUR FOLLOWING TOTAL REPLACEMENT OF HIP, SEQUELA: ICD-10-CM

## 2024-01-02 DIAGNOSIS — M80.00XG AGE-RELATED OSTEOPOROSIS WITH CURRENT PATHOLOGICAL FRACTURE WITH DELAYED HEALING, SUBSEQUENT ENCOUNTER: ICD-10-CM

## 2024-01-02 DIAGNOSIS — M97.8XXS PERIPROSTHETIC FRACTURE OF FEMUR FOLLOWING TOTAL REPLACEMENT OF HIP, SEQUELA: ICD-10-CM

## 2024-01-02 RX ORDER — TERIPARATIDE 250 UG/ML
20 INJECTION, SOLUTION SUBCUTANEOUS DAILY
Qty: 7.2 ML | Refills: 0 | Status: SHIPPED | OUTPATIENT
Start: 2024-01-02 | End: 2024-04-01

## 2024-01-02 NOTE — TELEPHONE ENCOUNTER
----- Message from Luna Washington sent at 1/2/2024 10:38 AM CST -----  Contact: Rosy  Patient is calling in regards to teriparatide (FORTEO) 20 mcg/dose (600mcg/2.4mL) PnIj. Reports [facility] states a 30 and 90 supply cost the same, so they're needing a new 90 supply sent in. Pt was seeing simon alcazar previously. Please return call to pt at .[phone].

## 2024-09-27 ENCOUNTER — OFFICE VISIT (OUTPATIENT)
Dept: DERMATOLOGY | Facility: CLINIC | Age: 89
End: 2024-09-27
Payer: MEDICARE

## 2024-09-27 DIAGNOSIS — L85.3 XEROSIS CUTIS: ICD-10-CM

## 2024-09-27 DIAGNOSIS — D48.5 NEOPLASM OF UNCERTAIN BEHAVIOR OF SKIN: Primary | ICD-10-CM

## 2024-09-27 PROCEDURE — 99214 OFFICE O/P EST MOD 30 MIN: CPT | Mod: PBBFAC | Performed by: STUDENT IN AN ORGANIZED HEALTH CARE EDUCATION/TRAINING PROGRAM

## 2024-09-27 PROCEDURE — 99999 PR PBB SHADOW E&M-EST. PATIENT-LVL IV: CPT | Mod: PBBFAC,,, | Performed by: STUDENT IN AN ORGANIZED HEALTH CARE EDUCATION/TRAINING PROGRAM

## 2024-09-27 RX ORDER — AMMONIUM LACTATE 12 G/100G
LOTION TOPICAL
Qty: 396 G | Refills: 5 | Status: SHIPPED | OUTPATIENT
Start: 2024-09-27

## 2024-09-27 NOTE — PROGRESS NOTES
Patient Information  Name: Rosy Balderrama  : 1931  MRN: [id number]     Referring Physician:  Dr. Mahan   Primary Care Physician:  Dr. Zhang, Maude VILLA MD   Date of Visit: 2024      Subjective:       Rosy Balderrama is a 89 y.o. female who presents for   Chief Complaint   Patient presents with    Lesion     C/o spot on left neck    Dry Skin     C/o dry skin      HPI  Patient is here with concern of: skin lesion  Location: left neck  Duration: year  Symptoms: nonhealing  Prior treatments: LN2 by outside derm    Patient is here with concern of: dry skin   Location: legs  Duration: years  Symptoms: dry skin   Prior treatments: OTC moisturizer      Patient was last seen:Visit date not found     Prior notes by myself reviewed.   Clinical documentation obtained by nursing staff reviewed.    Review of Systems   Skin:  Positive for rash and dry skin.        Objective:    Physical Exam   Constitutional: She appears well-developed and well-nourished. No distress.   Neurological: She is alert and oriented to person, place, and time. She is not disoriented.   Psychiatric: She has a normal mood and affect.   Skin:   Areas Examined (abnormalities noted in diagram):   Head / Face Inspection Performed  Neck Inspection Performed  RLE Inspected  LLE Inspection Performed                   Diagram Legend     Erythematous scaling macule/papule c/w actinic keratosis       Vascular papule c/w angioma      Pigmented verrucoid papule/plaque c/w seborrheic keratosis      Yellow umbilicated papule c/w sebaceous hyperplasia      Irregularly shaped tan macule c/w lentigo     1-2 mm smooth white papules consistent with Milia      Movable subcutaneous cyst with punctum c/w epidermal inclusion cyst      Subcutaneous movable cyst c/w pilar cyst      Firm pink to brown papule c/w dermatofibroma      Pedunculated fleshy papule(s) c/w skin tag(s)      Evenly pigmented macule c/w junctional nevus     Mildly variegated pigmented, slightly  irregular-bordered macule c/w mildly atypical nevus      Flesh colored to evenly pigmented papule c/w intradermal nevus       Pink pearly papule/plaque c/w basal cell carcinoma      Erythematous hyperkeratotic cursted plaque c/w SCC      Surgical scar with no sign of skin cancer recurrence      Open and closed comedones      Inflammatory papules and pustules      Verrucoid papule consistent consistent with wart     Erythematous eczematous patches and plaques     Dystrophic onycholytic nail with subungual debris c/w onychomycosis     Umbilicated papule    Erythematous-base heme-crusted tan verrucoid plaque consistent with inflamed seborrheic keratosis     Erythematous Silvery Scaling Plaque c/w Psoriasis     See annotation            [] Data reviewed  [] Independent review of test  [] Management discussed with another provider    Assessment / Plan:      Pathology Orders:       Normal Orders This Visit    Specimen to Pathology, Dermatology     Comments:    Number of Specimens:->1  ------------------------->-------------------------  Spec 1 Procedure:->Biopsy  Spec 1 Clinical Impression:->r/o NMSC  Spec 1 Source:->left neck  Release to patient->Immediate    Questions:    Procedure Type: Dermatology and skin neoplasms    Number of Specimens: 1    ------------------------: -------------------------    Spec 1 Procedure: Biopsy    Spec 1 Clinical Impression: r/o NMSC    Spec 1 Source: left neck    Release to patient: Immediate          Neoplasm of uncertain behavior of skin  -     Specimen to Pathology, Dermatology  Shave biopsy procedure note:    Shave biopsy performed after verbal consent including risk of infection, scar, recurrence, need for additional treatment of site. Area prepped with alcohol, anesthetized with approximately 1.0cc of 1% lidocaine with epinephrine. Lesional tissue shaved with dermablade. Hemostasis achieved with hyfrecation. No complications. Dressing applied. Wound care explained.    Xerosis  cutis  -     ammonium lactate (LAC-HYDRIN) 12 % lotion; Apply topically as needed for Dry Skin.  Dispense: 396 g; Refill: 5             LOS NUMBER AND COMPLEXITY OF PROBLEMS    COMPLEXITY OF DATA RISK TOTAL TIME (m)   10047  13017 [] 1 self-limited or minor problem [x] Minimal to none [] No treatment recommended or patient to monitor 15-29  10-19   71415  71283 Low  [] 2 or > self limited or minor problems  [] 1 stable chronic illness  [] 1 acute, uncomplicated illness or injury Limited (2)  [] Prior external notes from each unique source  [] Review result of each unique test  [] Order each unique test [x]  Low  OTC medications, minor skin biopsy 30-44  20-29   55079  43024 Moderate  [x]  1 or > chronic illness with progression, exacerbation or SE of treatment  []  2 or more stable chronic illnesses  []  1 acute illness with systemic symptoms  []  1 acute complicated injury  []  1 undiagnosed new problem with uncertain prognosis Moderate (1/3 below)  []  3 or more data items        *Now includes assessment requiring independent historian  []  Independent interpretation of a test  []  Discuss management/test with another provider Moderate  []  Prescription drug mgmt  []  Minor surgery with risk discussed  []  Mgmt limited by social determinates 45-59  30-39   83673  89139 High  []  1 or more chronic illness with severe exacerbation, progression or SE of treatment  []  1 acute or chronic illness/injury that poses a threat to life or bodily function Extensive (2/3 below)  []  3 or more data items        *Now includes assessment requiring independent historian.  []  Independent interpretation of a test  []  Discuss management/test with another provider High  []  Major surgery with risk discussed  []  Drug therapy requiring intensive monitoring for toxicity  []  Hospitalization  []  Decision for DNR 60-74  40-54      No follow-ups on file.    Nanette Terrell MD, FAAD  [facility]

## 2024-09-27 NOTE — PATIENT INSTRUCTIONS
Shave Biopsy Wound Care    Your doctor has performed a shave biopsy today.  A band aid and vaseline ointment has been placed over the site.  This should remain in place for NO LONGER THAN 48 hours.  It is fine to remove the bandaid after 24 hours, if the area is no longer bleeding. It is recommended that you keep the area dry (do not wet)) for the first 24 hours.  After 24 hours, wash the area with warm soap and water and apply Vaseline jelly.  Many patients prefer to use Neosporin or Bacitracin ointment.  This is acceptable; however, know that you can develop an allergy to this medication even if you have used it safely for years.  It is important to keep the area moist.  Letting it dry out and get air slows healing time, and will worsen the scar.        If you notice increasing redness, tenderness, pain, or yellow drainage at the biopsy site, please notify your doctor.  These are signs of an infection.    If your biopsy site is bleeding, apply firm pressure for 15 minutes straight.  Repeat for another 15 minutes, if it is still bleeding.   If the surgical site continues to bleed, then please contact your doctor.      For [facility] users:   You will receive your biopsy results in [facility] as soon as they are available. Please be assured that your physician/provider will review your results and will then determine what further treatment, evaluation, or planning is required. You should be contacted by your physician's/provider's office within 5 business days of receiving your results; If not, please reach out to directly. This is one more way [facility] is putting you first.     [facility] [place] [address]/ [phone] FAX/ [email or web address]

## 2024-10-03 ENCOUNTER — OFFICE VISIT (OUTPATIENT)
Dept: PODIATRY | Facility: CLINIC | Age: 89
End: 2024-10-03
Payer: MEDICARE

## 2024-10-03 DIAGNOSIS — L60.3 ONYCHODYSTROPHY: ICD-10-CM

## 2024-10-03 DIAGNOSIS — E11.9 CONTROLLED TYPE 2 DIABETES MELLITUS WITHOUT COMPLICATION, WITHOUT LONG-TERM CURRENT USE OF INSULIN: Primary | ICD-10-CM

## 2024-10-03 DIAGNOSIS — I48.0 PAROXYSMAL ATRIAL FIBRILLATION: ICD-10-CM

## 2024-10-03 DIAGNOSIS — I82.4Z2 ACUTE DEEP VEIN THROMBOSIS (DVT) OF DISTAL VEIN OF LEFT LOWER EXTREMITY: ICD-10-CM

## 2024-10-03 PROCEDURE — 11721 DEBRIDE NAIL 6 OR MORE: CPT | Mod: Q9,PBBFAC | Performed by: PODIATRIST

## 2024-10-03 PROCEDURE — 99213 OFFICE O/P EST LOW 20 MIN: CPT | Mod: PBBFAC | Performed by: PODIATRIST

## 2024-10-03 PROCEDURE — 99999 PR PBB SHADOW E&M-EST. PATIENT-LVL III: CPT | Mod: PBBFAC,,, | Performed by: PODIATRIST

## 2024-10-03 NOTE — PROGRESS NOTES
Subjective:       Patient ID: Rosy Balderrama is a 89 y.o. female.    Chief Complaint: Diabetic Foot Exam (DFE: Pt denies pain, pt is diabetic, was last seen 10.2.24 by Fiordaliza Lentz )      HPI: Patient presents to the office today with the chief complaint of elongated, thickened and dystrophic nail plates to the B/L foot. This patient is a Diabetic Type II, complicated with long-term anticoagulation therapy.. Patient does follow with Primary Care and/or Endocrinology for management of Diabetes Mellitus. This patient's PMD is Maude Zhang MD. This patient last saw his/her primary care provider on 10/02/2024.     Hemoglobin A1C   Date Value Ref Range Status   11/17/2022 5.9 (H) 4.0 - 5.6 % Final     Comment:     ADA Screening Guidelines:  5.7-6.4%  Consistent with prediabetes  >or=6.5%  Consistent with diabetes    High levels of fetal hemoglobin interfere with the HbA1C  assay. Heterozygous hemoglobin variants (HbS, HgC, etc)do  not significantly interfere with this assay.   However, presence of multiple variants may affect accuracy.     07/19/2022 5.3 4.0 - 5.6 % Final     Comment:     ADA Screening Guidelines:  5.7-6.4%  Consistent with prediabetes  >or=6.5%  Consistent with diabetes    High levels of fetal hemoglobin interfere with the HbA1C  assay. Heterozygous hemoglobin variants (HbS, HgC, etc)do  not significantly interfere with this assay.   However, presence of multiple variants may affect accuracy.     .     Review of patient's allergies indicates:   Allergen Reactions    Alendronate Other (See Comments)    Atorvastatin Other (See Comments)    Ciprofloxacin Other (See Comments)    Codeine Itching    Diazepam Itching    Ibuprofen Other (See Comments)    Nitrofurantoin monohyd/m-cryst     Oxycodone-acetaminophen Other (See Comments) and Itching    Rofecoxib Other (See Comments)    Seldane     Simvastatin Other (See Comments)       Past Medical History:   Diagnosis Date    *Atrial fibrillation      Allergy     Blood transfusion     Cataract     Diabetes mellitus type II     Hyperlipidemia     Osteoporosis        Family History   Problem Relation Name Age of Onset    Cancer Mother      Cancer Sister      Diabetes Brother         Social History     Socioeconomic History    Marital status:    Occupational History    Occupation: Retired/Housewife     Comment: Housewife   Tobacco Use    Smoking status: Never    Smokeless tobacco: Never   Substance and Sexual Activity    Alcohol use: No    Drug use: No   Social History Narrative    Patient is a housewife.       Past Surgical History:   Procedure Laterality Date    ANKLE SURGERY      broken ankle    BILATERAL SALPINGOOPHORECTOMY      BREAST BIOPSY      CARPAL TUNNEL RELEASE      CHOLECYSTECTOMY      COLONOSCOPY      ERCP      ESOPHAGOGASTRODUODENOSCOPY      FRACTURE SURGERY      KNEE CARTILAGE SURGERY      ORIF FEMUR FRACTURE Left 7/19/2022    Procedure: ORIF, FRACTURE, FEMUR;  Surgeon: Yulissa Marx MD;  Location: [facility];  Service: Orthopedics;  Laterality: Left;  Periprosthetic femur fracture    TOTAL ABDOMINAL HYSTERECTOMY      TOTAL HIP ARTHROPLASTY      TUBAL LIGATION         Review of Systems       Objective:   There were no vitals taken for this visit.    Physical Exam  LOWER EXTREMITY PHYSICAL EXAMINATION    VASCULAR:  The right dorsalis pedis pulse 2/4 and the right posterior tibial pulse 1/4.  The left dorsalis pedis pulse 2/4 and posterior tibial pulse on the left is 1/4.  Capillary refill is intact.  Pedal hair growth decreased.     NEUROLOGY:  Hypersensitive to touch.  Protective sensation intact.    DERMATOLOGY:  Skin is dry and xerotic.  There is no signs of callusing, ulcerations, other lesions identified to the dorsal or plantar aspect of the right or left foot.  The R1, 2, 5 and left L1,2, 5 are thickened, discolored dystrophic.  There is subungual debris.  Nail plates have area of dark discoloration.  The remaining nails 3-4 on the right  foot and the left foot are elongated but of normal color, thickness, and texture.   There is no signs of ingrowing into the medial or lateral borders.  There is no evidence of wounds or skin breakdown.  No edema or erythema.  No obvious lacerations or fissuring.  Interdigital spaces are clean, dry, intact.  No rashes or scars appreciated.    ORTHOPEDIC:  Ambulating with a wheelchair.  No history of amputations.    Assessment:     1. Controlled type 2 diabetes mellitus without complication, without long-term current use of insulin    2. Paroxysmal atrial fibrillation    3. Acute deep vein thrombosis (DVT) of distal vein of left lower extremity    4. Onychodystrophy        Plan:     Controlled type 2 diabetes mellitus without complication, without long-term current use of insulin    Paroxysmal atrial fibrillation    Acute deep vein thrombosis (DVT) of distal vein of left lower extremity    Onychodystrophy        Thorough discussion is had with the patient this afternoon, concerning the diagnosis, its etiology, and the treatment algorithm at present.  Greater than 50% of this visit spent on counseling and coordination of care. Greater than 15 minutes of a 20 minute appointment spent on education about the diabetic foot, neuropathy, and prevention of limb loss.  Shoe inspection. Diabetic Foot Education. Patient reminded of the importance of good nutrition and blood sugar control to help prevent podiatric complications of diabetes. Patient instructed on proper foot hygeine. We discussed wearing proper and supportive shoe gear, daily foot inspections, never walking barefooted or sock footed, never putting sharp instruments to feet which can cause major complications associated with infection, ulcers, lacerations.      Dystrophic nail plates, as outlined above (R#1,2,5  ; L#1,2,5 ), are sharply debrided with double action nail nipper, and/or with the assistance of a mechanical rotary racquel, with removal of all offending  nail and nail border(s), for reduction of pains. Nails are reduced in terms of length, width and girth with removal of subungual debris to facilitate pain free weight bearing and ambulation. The elongated nails as outlined in the objective portion of this note, were trimmed to appropriate length, with a double action nail nipper, for alleviation/reduction of pains as well. Follow up in approx. 3-4 months.      Future Appointments   Date Time Provider Department Center   4/3/2025 10:15 AM Lou Quintero DPM [facility]

## 2025-04-03 ENCOUNTER — OFFICE VISIT (OUTPATIENT)
Dept: PODIATRY | Facility: CLINIC | Age: OVER 89
End: 2025-04-03
Payer: MEDICARE

## 2025-04-03 DIAGNOSIS — E11.9 CONTROLLED TYPE 2 DIABETES MELLITUS WITHOUT COMPLICATION, WITHOUT LONG-TERM CURRENT USE OF INSULIN: Primary | ICD-10-CM

## 2025-04-03 DIAGNOSIS — I48.0 PAROXYSMAL ATRIAL FIBRILLATION: ICD-10-CM

## 2025-04-03 DIAGNOSIS — F03.B18 MODERATE DEMENTIA WITH OTHER BEHAVIORAL DISTURBANCE, UNSPECIFIED DEMENTIA TYPE: ICD-10-CM

## 2025-04-03 DIAGNOSIS — L60.3 ONYCHODYSTROPHY: ICD-10-CM

## 2025-04-03 PROCEDURE — 99999 PR PBB SHADOW E&M-EST. PATIENT-LVL III: CPT | Mod: PBBFAC,,, | Performed by: PODIATRIST

## 2025-04-03 PROCEDURE — 99213 OFFICE O/P EST LOW 20 MIN: CPT | Mod: PBBFAC | Performed by: PODIATRIST

## 2025-04-03 PROCEDURE — 11721 DEBRIDE NAIL 6 OR MORE: CPT | Mod: Q9,PBBFAC | Performed by: PODIATRIST

## 2025-04-03 RX ORDER — MUPIROCIN 20 MG/G
OINTMENT TOPICAL 2 TIMES DAILY
Qty: 30 G | Refills: 1 | Status: SHIPPED | OUTPATIENT
Start: 2025-04-03

## 2025-04-03 NOTE — PROGRESS NOTES
Subjective:       Patient ID: Rosy Balderrama is a 89 y.o. female.    Chief Complaint: Routine Foot Care (10.2.24 migelmathieu tejeda. Patient is diabetic and on hospice services. )      HPI: Patient presents to the office today with the chief complaint of elongated, thickened and dystrophic nail plates to the B/L foot. This patient is a Diabetic Type II, complicated with long-term anticoagulation therapy. Presents with family members/daughter present who states that dementia has worsened and patient is now in hospice. Patient does follow with Primary Care and/or Endocrinology for management of Diabetes Mellitus. This patient's PMD is Maude Zhang MD. This patient last saw his/her primary care provider on 10/02/2024.     Hemoglobin A1C   Date Value Ref Range Status   11/17/2022 5.9 (H) 4.0 - 5.6 % Final     Comment:     ADA Screening Guidelines:  5.7-6.4%  Consistent with prediabetes  >or=6.5%  Consistent with diabetes    High levels of fetal hemoglobin interfere with the HbA1C  assay. Heterozygous hemoglobin variants (HbS, HgC, etc)do  not significantly interfere with this assay.   However, presence of multiple variants may affect accuracy.     07/19/2022 5.3 4.0 - 5.6 % Final     Comment:     ADA Screening Guidelines:  5.7-6.4%  Consistent with prediabetes  >or=6.5%  Consistent with diabetes    High levels of fetal hemoglobin interfere with the HbA1C  assay. Heterozygous hemoglobin variants (HbS, HgC, etc)do  not significantly interfere with this assay.   However, presence of multiple variants may affect accuracy.     .     Review of patient's allergies indicates:   Allergen Reactions    Alendronate Other (See Comments)    Atorvastatin Other (See Comments)    Ciprofloxacin Other (See Comments)    Codeine Itching    Diazepam Itching    Ibuprofen Other (See Comments)    Nitrofurantoin monohyd/m-cryst     Oxycodone-acetaminophen Other (See Comments) and Itching    Rofecoxib Other (See Comments)    Seldane      Simvastatin Other (See Comments)       Past Medical History:   Diagnosis Date    *Atrial fibrillation     Allergy     Blood transfusion     Cataract     Diabetes mellitus type II     Hyperlipidemia     Osteoporosis        Family History   Problem Relation Name Age of Onset    Cancer Mother      Cancer Sister      Diabetes Brother         Social History     Socioeconomic History    Marital status:    Occupational History    Occupation: Retired/Housewife     Comment: Housewife   Tobacco Use    Smoking status: Never    Smokeless tobacco: Never   Substance and Sexual Activity    Alcohol use: No    Drug use: No   Social History Narrative    Patient is a housewife.       Past Surgical History:   Procedure Laterality Date    ANKLE SURGERY      broken ankle    BILATERAL SALPINGOOPHORECTOMY      BREAST BIOPSY      CARPAL TUNNEL RELEASE      CHOLECYSTECTOMY      COLONOSCOPY      ERCP      ESOPHAGOGASTRODUODENOSCOPY      FRACTURE SURGERY      KNEE CARTILAGE SURGERY      ORIF FEMUR FRACTURE Left 7/19/2022    Procedure: ORIF, FRACTURE, FEMUR;  Surgeon: Yulissa Marx MD;  Location: [facility];  Service: Orthopedics;  Laterality: Left;  Periprosthetic femur fracture    TOTAL ABDOMINAL HYSTERECTOMY      TOTAL HIP ARTHROPLASTY      TUBAL LIGATION         Review of Systems       Objective:   There were no vitals taken for this visit.    Physical Exam  LOWER EXTREMITY PHYSICAL EXAMINATION    VASCULAR:  The right dorsalis pedis pulse 2/4 and the right posterior tibial pulse 1/4.  The left dorsalis pedis pulse 2/4 and posterior tibial pulse on the left is 1/4.  Capillary refill is intact.  Pedal hair growth decreased.     NEUROLOGY:  Hypersensitive to touch.  Protective sensation intact.    DERMATOLOGY:  Skin is dry and xerotic.  There is no signs of callusing, ulcerations, other lesions identified to the dorsal or plantar aspect of the right or left foot.  The R1, 2, 5 and left L1,2, 5 are thickened, discolored dystrophic.  There  is subungual debris.  Nail plates have area of dark discoloration.  The remaining nails 3-4 on the right foot and the left foot are elongated but of normal color, thickness, and texture.   Appears to show some erythema to the medial border of the right 2nd toe.  Slight cryptitis border noted medially.    No edema or erythema.  No obvious lacerations or fissuring.  Interdigital spaces are clean, dry, intact.  No rashes or scars appreciated.    ORTHOPEDIC:  Ambulating with a wheelchair.  No history of amputations.    Assessment:     1. Controlled type 2 diabetes mellitus without complication, without long-term current use of insulin    2. Paroxysmal atrial fibrillation    3. Moderate dementia with other behavioral disturbance, unspecified dementia type    4. Onychodystrophy          Plan:     Controlled type 2 diabetes mellitus without complication, without long-term current use of insulin    Paroxysmal atrial fibrillation    Moderate dementia with other behavioral disturbance, unspecified dementia type    Onychodystrophy    Other orders  -     mupirocin (BACTROBAN) 2 % ointment; Apply topically 2 (two) times daily.  Dispense: 30 g; Refill: 1          Thorough discussion is had with the patient this afternoon, concerning the diagnosis, its etiology, and the treatment algorithm at present.  Greater than 50% of this visit spent on counseling and coordination of care. Greater than 15 minutes of a 20 minute appointment spent on education about the diabetic foot, neuropathy, and prevention of limb loss.  Shoe inspection. Diabetic Foot Education. Patient reminded of the importance of good nutrition and blood sugar control to help prevent podiatric complications of diabetes. Patient instructed on proper foot hygeine. We discussed wearing proper and supportive shoe gear, daily foot inspections, never walking barefooted or sock footed, never putting sharp instruments to feet which can cause major complications associated with  infection, ulcers, lacerations.      Dystrophic nail plates, as outlined above (R#1,2,5  ; L#1,2,5 ), are sharply debrided with double action nail nipper, and/or with the assistance of a mechanical rotary racquel, with removal of all offending nail and nail border(s), for reduction of pains. Nails are reduced in terms of length, width and girth with removal of subungual debris to facilitate pain free weight bearing and ambulation. The elongated nails as outlined in the objective portion of this note, were trimmed to appropriate length, with a double action nail nipper, for alleviation/reduction of pains as well. Follow up in approx. 3-4 months.    Patient and family members to have antibiotic cream placed the medial aspect of the left 2nd toe daily    No future appointments.

## (undated) DEVICE — SUT 0 VICRYL / CT-1

## (undated) DEVICE — DRAPE MOBILE C-ARM

## (undated) DEVICE — DRAPE STERI U-SHAPED 47X51IN

## (undated) DEVICE — SUT VICRYL PLUS 0 CT1 36IN

## (undated) DEVICE — DRESSING AQUACEL AG 3.5X10IN

## (undated) DEVICE — UNDERGLOVES BIOGEL PI SZ 7 LF

## (undated) DEVICE — ALCOHOL 70% ISOP RUBBING 4OZ

## (undated) DEVICE — SUPPORT ULNA NERVE PROTECTOR

## (undated) DEVICE — ELECTRODE REM PLYHSV RETURN 9

## (undated) DEVICE — GOWN POLY REINF BRTH SLV XL

## (undated) DEVICE — 3.2 DRILL BIT

## (undated) DEVICE — IMPLANTABLE DEVICE
Type: IMPLANTABLE DEVICE | Site: FEMUR | Status: NON-FUNCTIONAL
Removed: 2022-07-19

## (undated) DEVICE — APPLICATOR CHLORAPREP ORN 26ML

## (undated) DEVICE — SET CABLE BEADED 2MM

## (undated) DEVICE — DRAPE U SPLIT SHEET 54X76IN

## (undated) DEVICE — GLOVE SURGICAL LATEX SZ 7

## (undated) DEVICE — COVER LIGHT HANDLE 80/CA

## (undated) DEVICE — PACK BASIC SETUP SC BR

## (undated) DEVICE — BLADE SURG CARBON STEEL #10

## (undated) DEVICE — STAPLER SKIN PROXIMATE WIDE

## (undated) DEVICE — SUT VICRYL 2-0 27 CT-1

## (undated) DEVICE — TOWEL OR DISP STRL BLUE 4/PK

## (undated) DEVICE — GAUZE SPONGE 4X4 12PLY

## (undated) DEVICE — DECANTER 6 VIAL

## (undated) DEVICE — SUT 1 18IN COATED VICRYL U

## (undated) DEVICE — 4.3 DRILL BIT

## (undated) DEVICE — TUBING MEDI-VAC 20FT .25IN

## (undated) DEVICE — DRAPE T TRNSVRS LAP 102X78X121

## (undated) DEVICE — CLOSURE SKIN STERI STRIP 1/2X4

## (undated) DEVICE — SHEET SPLIT IMPERVIOUS 60X70

## (undated) DEVICE — ADHESIVE MASTISOL VIAL 48/BX

## (undated) DEVICE — KWIRE DRILL TIP 2X234MM
Type: IMPLANTABLE DEVICE | Site: FEMUR | Status: NON-FUNCTIONAL
Removed: 2022-07-19

## (undated) DEVICE — MANIFOLD 4 PORT